# Patient Record
Sex: FEMALE | Race: BLACK OR AFRICAN AMERICAN | NOT HISPANIC OR LATINO | Employment: UNEMPLOYED | URBAN - METROPOLITAN AREA
[De-identification: names, ages, dates, MRNs, and addresses within clinical notes are randomized per-mention and may not be internally consistent; named-entity substitution may affect disease eponyms.]

---

## 2023-03-22 LAB
ALANINE AMINOTRANSFERASE (SGPT) (U/L) IN SER/PLAS: 13 U/L (ref 7–45)
ALBUMIN (G/DL) IN SER/PLAS: 3.7 G/DL (ref 3.4–5)
ALKALINE PHOSPHATASE (U/L) IN SER/PLAS: 102 U/L (ref 33–110)
ANION GAP IN SER/PLAS: 14 MMOL/L (ref 10–20)
ASPARTATE AMINOTRANSFERASE (SGOT) (U/L) IN SER/PLAS: 17 U/L (ref 9–39)
BASOPHILS (10*3/UL) IN BLOOD BY AUTOMATED COUNT: 0.06 X10E9/L (ref 0–0.1)
BASOPHILS/100 LEUKOCYTES IN BLOOD BY AUTOMATED COUNT: 0.3 % (ref 0–2)
BILIRUBIN DIRECT (MG/DL) IN SER/PLAS: 0.1 MG/DL (ref 0–0.3)
BILIRUBIN TOTAL (MG/DL) IN SER/PLAS: 0.6 MG/DL (ref 0–1.2)
C REACTIVE PROTEIN (MG/L) IN SER/PLAS: 15.92 MG/DL
CALCIUM (MG/DL) IN SER/PLAS: 9.2 MG/DL (ref 8.6–10.6)
CARBON DIOXIDE, TOTAL (MMOL/L) IN SER/PLAS: 26 MMOL/L (ref 21–32)
CHLORIDE (MMOL/L) IN SER/PLAS: 99 MMOL/L (ref 98–107)
CREATININE (MG/DL) IN SER/PLAS: 0.78 MG/DL (ref 0.5–1.05)
EOSINOPHILS (10*3/UL) IN BLOOD BY AUTOMATED COUNT: 0.26 X10E9/L (ref 0–0.7)
EOSINOPHILS/100 LEUKOCYTES IN BLOOD BY AUTOMATED COUNT: 1.4 % (ref 0–6)
ERYTHROCYTE DISTRIBUTION WIDTH (RATIO) BY AUTOMATED COUNT: 14.8 % (ref 11.5–14.5)
ERYTHROCYTE MEAN CORPUSCULAR HEMOGLOBIN CONCENTRATION (G/DL) BY AUTOMATED: 31.1 G/DL (ref 32–36)
ERYTHROCYTE MEAN CORPUSCULAR VOLUME (FL) BY AUTOMATED COUNT: 82 FL (ref 80–100)
ERYTHROCYTES (10*6/UL) IN BLOOD BY AUTOMATED COUNT: 3.8 X10E12/L (ref 4–5.2)
GFR FEMALE: >90 ML/MIN/1.73M2
GLUCOSE (MG/DL) IN SER/PLAS: 76 MG/DL (ref 74–99)
HEMATOCRIT (%) IN BLOOD BY AUTOMATED COUNT: 31.2 % (ref 36–46)
HEMOGLOBIN (G/DL) IN BLOOD: 9.7 G/DL (ref 12–16)
IMMATURE GRANULOCYTES/100 LEUKOCYTES IN BLOOD BY AUTOMATED COUNT: 0.3 % (ref 0–0.9)
LEUKOCYTES (10*3/UL) IN BLOOD BY AUTOMATED COUNT: 18.2 X10E9/L (ref 4.4–11.3)
LYMPHOCYTES (10*3/UL) IN BLOOD BY AUTOMATED COUNT: 3.29 X10E9/L (ref 1.2–4.8)
LYMPHOCYTES/100 LEUKOCYTES IN BLOOD BY AUTOMATED COUNT: 18.1 % (ref 13–44)
MONOCYTES (10*3/UL) IN BLOOD BY AUTOMATED COUNT: 1.53 X10E9/L (ref 0.1–1)
MONOCYTES/100 LEUKOCYTES IN BLOOD BY AUTOMATED COUNT: 8.4 % (ref 2–10)
NEUTROPHILS (10*3/UL) IN BLOOD BY AUTOMATED COUNT: 12.97 X10E9/L (ref 1.2–7.7)
NEUTROPHILS/100 LEUKOCYTES IN BLOOD BY AUTOMATED COUNT: 71.5 % (ref 40–80)
NRBC (PER 100 WBCS) BY AUTOMATED COUNT: 0 /100 WBC (ref 0–0)
PLATELETS (10*3/UL) IN BLOOD AUTOMATED COUNT: 584 X10E9/L (ref 150–450)
POTASSIUM (MMOL/L) IN SER/PLAS: 4.6 MMOL/L (ref 3.5–5.3)
PROTEIN TOTAL: 7.1 G/DL (ref 6.4–8.2)
SODIUM (MMOL/L) IN SER/PLAS: 134 MMOL/L (ref 136–145)
UREA NITROGEN (MG/DL) IN SER/PLAS: 7 MG/DL (ref 6–23)

## 2023-03-31 LAB
ALANINE AMINOTRANSFERASE (SGPT) (U/L) IN SER/PLAS: 9 U/L (ref 7–45)
ALBUMIN (G/DL) IN SER/PLAS: 3.7 G/DL (ref 3.4–5)
ALKALINE PHOSPHATASE (U/L) IN SER/PLAS: 103 U/L (ref 33–110)
ANION GAP IN SER/PLAS: 13 MMOL/L (ref 10–20)
ASPARTATE AMINOTRANSFERASE (SGOT) (U/L) IN SER/PLAS: 12 U/L (ref 9–39)
BASOPHILS (10*3/UL) IN BLOOD BY AUTOMATED COUNT: 0.06 X10E9/L (ref 0–0.1)
BASOPHILS/100 LEUKOCYTES IN BLOOD BY AUTOMATED COUNT: 0.5 % (ref 0–2)
BILIRUBIN DIRECT (MG/DL) IN SER/PLAS: 0 MG/DL (ref 0–0.3)
BILIRUBIN TOTAL (MG/DL) IN SER/PLAS: 0.3 MG/DL (ref 0–1.2)
C REACTIVE PROTEIN (MG/L) IN SER/PLAS: 1.74 MG/DL
CALCIUM (MG/DL) IN SER/PLAS: 8.8 MG/DL (ref 8.6–10.3)
CARBON DIOXIDE, TOTAL (MMOL/L) IN SER/PLAS: 26 MMOL/L (ref 21–32)
CHLORIDE (MMOL/L) IN SER/PLAS: 99 MMOL/L (ref 98–107)
CREATININE (MG/DL) IN SER/PLAS: 0.66 MG/DL (ref 0.5–1.05)
EOSINOPHILS (10*3/UL) IN BLOOD BY AUTOMATED COUNT: 0.16 X10E9/L (ref 0–0.7)
EOSINOPHILS/100 LEUKOCYTES IN BLOOD BY AUTOMATED COUNT: 1.3 % (ref 0–6)
ERYTHROCYTE DISTRIBUTION WIDTH (RATIO) BY AUTOMATED COUNT: 14.3 % (ref 11.5–14.5)
ERYTHROCYTE MEAN CORPUSCULAR HEMOGLOBIN CONCENTRATION (G/DL) BY AUTOMATED: 31.5 G/DL (ref 32–36)
ERYTHROCYTE MEAN CORPUSCULAR VOLUME (FL) BY AUTOMATED COUNT: 81 FL (ref 80–100)
ERYTHROCYTES (10*6/UL) IN BLOOD BY AUTOMATED COUNT: 3.79 X10E12/L (ref 4–5.2)
GFR FEMALE: >90 ML/MIN/1.73M2
GLUCOSE (MG/DL) IN SER/PLAS: 85 MG/DL (ref 74–99)
HEMATOCRIT (%) IN BLOOD BY AUTOMATED COUNT: 30.8 % (ref 36–46)
HEMOGLOBIN (G/DL) IN BLOOD: 9.7 G/DL (ref 12–16)
IMMATURE GRANULOCYTES/100 LEUKOCYTES IN BLOOD BY AUTOMATED COUNT: 0.4 % (ref 0–0.9)
LEUKOCYTES (10*3/UL) IN BLOOD BY AUTOMATED COUNT: 11.9 X10E9/L (ref 4.4–11.3)
LYMPHOCYTES (10*3/UL) IN BLOOD BY AUTOMATED COUNT: 3.94 X10E9/L (ref 1.2–4.8)
LYMPHOCYTES/100 LEUKOCYTES IN BLOOD BY AUTOMATED COUNT: 33 % (ref 13–44)
MONOCYTES (10*3/UL) IN BLOOD BY AUTOMATED COUNT: 0.72 X10E9/L (ref 0.1–1)
MONOCYTES/100 LEUKOCYTES IN BLOOD BY AUTOMATED COUNT: 6 % (ref 2–10)
NEUTROPHILS (10*3/UL) IN BLOOD BY AUTOMATED COUNT: 7 X10E9/L (ref 1.2–7.7)
NEUTROPHILS/100 LEUKOCYTES IN BLOOD BY AUTOMATED COUNT: 58.8 % (ref 40–80)
PLATELETS (10*3/UL) IN BLOOD AUTOMATED COUNT: 660 X10E9/L (ref 150–450)
POTASSIUM (MMOL/L) IN SER/PLAS: 4.2 MMOL/L (ref 3.5–5.3)
PROTEIN TOTAL: 7.3 G/DL (ref 6.4–8.2)
SODIUM (MMOL/L) IN SER/PLAS: 134 MMOL/L (ref 136–145)
UREA NITROGEN (MG/DL) IN SER/PLAS: 6 MG/DL (ref 6–23)

## 2023-08-02 LAB
ALANINE AMINOTRANSFERASE (SGPT) (U/L) IN SER/PLAS: 8 U/L (ref 7–45)
ALBUMIN (G/DL) IN SER/PLAS: 3.6 G/DL (ref 3.4–5)
ALKALINE PHOSPHATASE (U/L) IN SER/PLAS: 94 U/L (ref 33–110)
ANION GAP IN SER/PLAS: 12 MMOL/L (ref 10–20)
ASPARTATE AMINOTRANSFERASE (SGOT) (U/L) IN SER/PLAS: 13 U/L (ref 9–39)
BASOPHILS (10*3/UL) IN BLOOD BY AUTOMATED COUNT: 0.05 X10E9/L (ref 0–0.1)
BASOPHILS/100 LEUKOCYTES IN BLOOD BY AUTOMATED COUNT: 0.5 % (ref 0–2)
BILIRUBIN TOTAL (MG/DL) IN SER/PLAS: 0.3 MG/DL (ref 0–1.2)
C REACTIVE PROTEIN (MG/L) IN SER/PLAS: 0.76 MG/DL
CALCIUM (MG/DL) IN SER/PLAS: 8.3 MG/DL (ref 8.6–10.3)
CARBON DIOXIDE, TOTAL (MMOL/L) IN SER/PLAS: 25 MMOL/L (ref 21–32)
CHLORIDE (MMOL/L) IN SER/PLAS: 104 MMOL/L (ref 98–107)
CREATININE (MG/DL) IN SER/PLAS: 0.66 MG/DL (ref 0.5–1.05)
EOSINOPHILS (10*3/UL) IN BLOOD BY AUTOMATED COUNT: 0.2 X10E9/L (ref 0–0.7)
EOSINOPHILS/100 LEUKOCYTES IN BLOOD BY AUTOMATED COUNT: 1.8 % (ref 0–6)
ERYTHROCYTE DISTRIBUTION WIDTH (RATIO) BY AUTOMATED COUNT: 16.1 % (ref 11.5–14.5)
ERYTHROCYTE MEAN CORPUSCULAR HEMOGLOBIN CONCENTRATION (G/DL) BY AUTOMATED: 32.2 G/DL (ref 32–36)
ERYTHROCYTE MEAN CORPUSCULAR VOLUME (FL) BY AUTOMATED COUNT: 82 FL (ref 80–100)
ERYTHROCYTES (10*6/UL) IN BLOOD BY AUTOMATED COUNT: 3.73 X10E12/L (ref 4–5.2)
GFR FEMALE: >90 ML/MIN/1.73M2
GLUCOSE (MG/DL) IN SER/PLAS: 77 MG/DL (ref 74–99)
HEMATOCRIT (%) IN BLOOD BY AUTOMATED COUNT: 30.7 % (ref 36–46)
HEMOGLOBIN (G/DL) IN BLOOD: 9.9 G/DL (ref 12–16)
IMMATURE GRANULOCYTES/100 LEUKOCYTES IN BLOOD BY AUTOMATED COUNT: 0.3 % (ref 0–0.9)
LEUKOCYTES (10*3/UL) IN BLOOD BY AUTOMATED COUNT: 11.1 X10E9/L (ref 4.4–11.3)
LYMPHOCYTES (10*3/UL) IN BLOOD BY AUTOMATED COUNT: 2.98 X10E9/L (ref 1.2–4.8)
LYMPHOCYTES/100 LEUKOCYTES IN BLOOD BY AUTOMATED COUNT: 26.8 % (ref 13–44)
MONOCYTES (10*3/UL) IN BLOOD BY AUTOMATED COUNT: 0.86 X10E9/L (ref 0.1–1)
MONOCYTES/100 LEUKOCYTES IN BLOOD BY AUTOMATED COUNT: 7.7 % (ref 2–10)
NEUTROPHILS (10*3/UL) IN BLOOD BY AUTOMATED COUNT: 6.98 X10E9/L (ref 1.2–7.7)
NEUTROPHILS/100 LEUKOCYTES IN BLOOD BY AUTOMATED COUNT: 62.9 % (ref 40–80)
PLATELETS (10*3/UL) IN BLOOD AUTOMATED COUNT: 591 X10E9/L (ref 150–450)
POTASSIUM (MMOL/L) IN SER/PLAS: 3.9 MMOL/L (ref 3.5–5.3)
PROTEIN TOTAL: 6.6 G/DL (ref 6.4–8.2)
SEDIMENTATION RATE, ERYTHROCYTE: 28 MM/H (ref 0–20)
SODIUM (MMOL/L) IN SER/PLAS: 137 MMOL/L (ref 136–145)
UREA NITROGEN (MG/DL) IN SER/PLAS: 5 MG/DL (ref 6–23)

## 2023-08-14 LAB
ALANINE AMINOTRANSFERASE (SGPT) (U/L) IN SER/PLAS: 11 U/L (ref 7–45)
ALBUMIN (G/DL) IN SER/PLAS: 4.2 G/DL (ref 3.4–5)
ALKALINE PHOSPHATASE (U/L) IN SER/PLAS: 129 U/L (ref 33–110)
ANION GAP IN SER/PLAS: 15 MMOL/L (ref 10–20)
ASPARTATE AMINOTRANSFERASE (SGOT) (U/L) IN SER/PLAS: 15 U/L (ref 9–39)
BASOPHILS (10*3/UL) IN BLOOD BY AUTOMATED COUNT: 0.03 X10E9/L (ref 0–0.1)
BASOPHILS/100 LEUKOCYTES IN BLOOD BY AUTOMATED COUNT: 0.3 % (ref 0–2)
BILIRUBIN TOTAL (MG/DL) IN SER/PLAS: 0.4 MG/DL (ref 0–1.2)
C REACTIVE PROTEIN (MG/L) IN SER/PLAS: 0.36 MG/DL
CALCIUM (MG/DL) IN SER/PLAS: 9.5 MG/DL (ref 8.6–10.6)
CARBON DIOXIDE, TOTAL (MMOL/L) IN SER/PLAS: 24 MMOL/L (ref 21–32)
CHLORIDE (MMOL/L) IN SER/PLAS: 101 MMOL/L (ref 98–107)
CREATININE (MG/DL) IN SER/PLAS: 0.81 MG/DL (ref 0.5–1.05)
EOSINOPHILS (10*3/UL) IN BLOOD BY AUTOMATED COUNT: 0.16 X10E9/L (ref 0–0.7)
EOSINOPHILS/100 LEUKOCYTES IN BLOOD BY AUTOMATED COUNT: 1.7 % (ref 0–6)
ERYTHROCYTE DISTRIBUTION WIDTH (RATIO) BY AUTOMATED COUNT: 15.6 % (ref 11.5–14.5)
ERYTHROCYTE MEAN CORPUSCULAR HEMOGLOBIN CONCENTRATION (G/DL) BY AUTOMATED: 31.2 G/DL (ref 32–36)
ERYTHROCYTE MEAN CORPUSCULAR VOLUME (FL) BY AUTOMATED COUNT: 84 FL (ref 80–100)
ERYTHROCYTES (10*6/UL) IN BLOOD BY AUTOMATED COUNT: 4.04 X10E12/L (ref 4–5.2)
GFR FEMALE: >90 ML/MIN/1.73M2
GLUCOSE (MG/DL) IN SER/PLAS: 85 MG/DL (ref 74–99)
HEMATOCRIT (%) IN BLOOD BY AUTOMATED COUNT: 34 % (ref 36–46)
HEMOGLOBIN (G/DL) IN BLOOD: 10.6 G/DL (ref 12–16)
IMMATURE GRANULOCYTES/100 LEUKOCYTES IN BLOOD BY AUTOMATED COUNT: 0.2 % (ref 0–0.9)
LEUKOCYTES (10*3/UL) IN BLOOD BY AUTOMATED COUNT: 9.2 X10E9/L (ref 4.4–11.3)
LYMPHOCYTES (10*3/UL) IN BLOOD BY AUTOMATED COUNT: 3.46 X10E9/L (ref 1.2–4.8)
LYMPHOCYTES/100 LEUKOCYTES IN BLOOD BY AUTOMATED COUNT: 37.6 % (ref 13–44)
MONOCYTES (10*3/UL) IN BLOOD BY AUTOMATED COUNT: 0.64 X10E9/L (ref 0.1–1)
MONOCYTES/100 LEUKOCYTES IN BLOOD BY AUTOMATED COUNT: 7 % (ref 2–10)
NEUTROPHILS (10*3/UL) IN BLOOD BY AUTOMATED COUNT: 4.89 X10E9/L (ref 1.2–7.7)
NEUTROPHILS/100 LEUKOCYTES IN BLOOD BY AUTOMATED COUNT: 53.2 % (ref 40–80)
NRBC (PER 100 WBCS) BY AUTOMATED COUNT: 0 /100 WBC (ref 0–0)
PLATELETS (10*3/UL) IN BLOOD AUTOMATED COUNT: 550 X10E9/L (ref 150–450)
POTASSIUM (MMOL/L) IN SER/PLAS: 4.9 MMOL/L (ref 3.5–5.3)
PROTEIN TOTAL: 7.9 G/DL (ref 6.4–8.2)
SODIUM (MMOL/L) IN SER/PLAS: 135 MMOL/L (ref 136–145)
UREA NITROGEN (MG/DL) IN SER/PLAS: 4 MG/DL (ref 6–23)

## 2023-08-28 LAB
ALANINE AMINOTRANSFERASE (SGPT) (U/L) IN SER/PLAS: 15 U/L (ref 7–45)
ALBUMIN (G/DL) IN SER/PLAS: 3.9 G/DL (ref 3.4–5)
ALKALINE PHOSPHATASE (U/L) IN SER/PLAS: 115 U/L (ref 33–110)
ANION GAP IN SER/PLAS: 11 MMOL/L (ref 10–20)
ASPARTATE AMINOTRANSFERASE (SGOT) (U/L) IN SER/PLAS: 17 U/L (ref 9–39)
BASOPHILS (10*3/UL) IN BLOOD BY AUTOMATED COUNT: 0.04 X10E9/L (ref 0–0.1)
BASOPHILS/100 LEUKOCYTES IN BLOOD BY AUTOMATED COUNT: 0.5 % (ref 0–2)
BILIRUBIN TOTAL (MG/DL) IN SER/PLAS: 0.2 MG/DL (ref 0–1.2)
C REACTIVE PROTEIN (MG/L) IN SER/PLAS: 0.29 MG/DL
CALCIUM (MG/DL) IN SER/PLAS: 9.1 MG/DL (ref 8.6–10.6)
CARBON DIOXIDE, TOTAL (MMOL/L) IN SER/PLAS: 24 MMOL/L (ref 21–32)
CHLORIDE (MMOL/L) IN SER/PLAS: 104 MMOL/L (ref 98–107)
CREATININE (MG/DL) IN SER/PLAS: 0.82 MG/DL (ref 0.5–1.05)
EOSINOPHILS (10*3/UL) IN BLOOD BY AUTOMATED COUNT: 0.16 X10E9/L (ref 0–0.7)
EOSINOPHILS/100 LEUKOCYTES IN BLOOD BY AUTOMATED COUNT: 2 % (ref 0–6)
ERYTHROCYTE DISTRIBUTION WIDTH (RATIO) BY AUTOMATED COUNT: 15.6 % (ref 11.5–14.5)
ERYTHROCYTE MEAN CORPUSCULAR HEMOGLOBIN CONCENTRATION (G/DL) BY AUTOMATED: 31.9 G/DL (ref 32–36)
ERYTHROCYTE MEAN CORPUSCULAR VOLUME (FL) BY AUTOMATED COUNT: 84 FL (ref 80–100)
ERYTHROCYTES (10*6/UL) IN BLOOD BY AUTOMATED COUNT: 3.92 X10E12/L (ref 4–5.2)
GFR FEMALE: >90 ML/MIN/1.73M2
GLUCOSE (MG/DL) IN SER/PLAS: 73 MG/DL (ref 74–99)
HEMATOCRIT (%) IN BLOOD BY AUTOMATED COUNT: 32.9 % (ref 36–46)
HEMOGLOBIN (G/DL) IN BLOOD: 10.5 G/DL (ref 12–16)
IMMATURE GRANULOCYTES/100 LEUKOCYTES IN BLOOD BY AUTOMATED COUNT: 0.1 % (ref 0–0.9)
LEUKOCYTES (10*3/UL) IN BLOOD BY AUTOMATED COUNT: 7.9 X10E9/L (ref 4.4–11.3)
LYMPHOCYTES (10*3/UL) IN BLOOD BY AUTOMATED COUNT: 3.53 X10E9/L (ref 1.2–4.8)
LYMPHOCYTES/100 LEUKOCYTES IN BLOOD BY AUTOMATED COUNT: 44.7 % (ref 13–44)
MONOCYTES (10*3/UL) IN BLOOD BY AUTOMATED COUNT: 0.51 X10E9/L (ref 0.1–1)
MONOCYTES/100 LEUKOCYTES IN BLOOD BY AUTOMATED COUNT: 6.5 % (ref 2–10)
NEUTROPHILS (10*3/UL) IN BLOOD BY AUTOMATED COUNT: 3.64 X10E9/L (ref 1.2–7.7)
NEUTROPHILS/100 LEUKOCYTES IN BLOOD BY AUTOMATED COUNT: 46.2 % (ref 40–80)
NRBC (PER 100 WBCS) BY AUTOMATED COUNT: 0 /100 WBC (ref 0–0)
PLATELETS (10*3/UL) IN BLOOD AUTOMATED COUNT: 530 X10E9/L (ref 150–450)
POTASSIUM (MMOL/L) IN SER/PLAS: 4.5 MMOL/L (ref 3.5–5.3)
PROTEIN TOTAL: 7 G/DL (ref 6.4–8.2)
SEDIMENTATION RATE, ERYTHROCYTE: 31 MM/H (ref 0–20)
SODIUM (MMOL/L) IN SER/PLAS: 134 MMOL/L (ref 136–145)
UREA NITROGEN (MG/DL) IN SER/PLAS: 4 MG/DL (ref 6–23)

## 2023-09-16 LAB
ALANINE AMINOTRANSFERASE (SGPT) (U/L) IN SER/PLAS: 9 U/L (ref 7–45)
ALBUMIN (G/DL) IN SER/PLAS: 4.1 G/DL (ref 3.4–5)
ALKALINE PHOSPHATASE (U/L) IN SER/PLAS: 103 U/L (ref 33–110)
ANION GAP IN SER/PLAS: 11 MMOL/L (ref 10–20)
ASPARTATE AMINOTRANSFERASE (SGOT) (U/L) IN SER/PLAS: 15 U/L (ref 9–39)
BASOPHILS (10*3/UL) IN BLOOD BY AUTOMATED COUNT: 0.04 X10E9/L (ref 0–0.1)
BASOPHILS/100 LEUKOCYTES IN BLOOD BY AUTOMATED COUNT: 0.3 % (ref 0–2)
BILIRUBIN TOTAL (MG/DL) IN SER/PLAS: 0.2 MG/DL (ref 0–1.2)
C REACTIVE PROTEIN (MG/L) IN SER/PLAS: 0.32 MG/DL
CALCIUM (MG/DL) IN SER/PLAS: 9.2 MG/DL (ref 8.6–10.6)
CARBON DIOXIDE, TOTAL (MMOL/L) IN SER/PLAS: 25 MMOL/L (ref 21–32)
CHLORIDE (MMOL/L) IN SER/PLAS: 102 MMOL/L (ref 98–107)
CREATININE (MG/DL) IN SER/PLAS: 0.91 MG/DL (ref 0.5–1.05)
EOSINOPHILS (10*3/UL) IN BLOOD BY AUTOMATED COUNT: 0.13 X10E9/L (ref 0–0.7)
EOSINOPHILS/100 LEUKOCYTES IN BLOOD BY AUTOMATED COUNT: 1.1 % (ref 0–6)
ERYTHROCYTE DISTRIBUTION WIDTH (RATIO) BY AUTOMATED COUNT: 15.6 % (ref 11.5–14.5)
ERYTHROCYTE MEAN CORPUSCULAR HEMOGLOBIN CONCENTRATION (G/DL) BY AUTOMATED: 31.5 G/DL (ref 32–36)
ERYTHROCYTE MEAN CORPUSCULAR VOLUME (FL) BY AUTOMATED COUNT: 83 FL (ref 80–100)
ERYTHROCYTES (10*6/UL) IN BLOOD BY AUTOMATED COUNT: 3.94 X10E12/L (ref 4–5.2)
GFR FEMALE: 82 ML/MIN/1.73M2
GLUCOSE (MG/DL) IN SER/PLAS: 84 MG/DL (ref 74–99)
HEMATOCRIT (%) IN BLOOD BY AUTOMATED COUNT: 32.7 % (ref 36–46)
HEMOGLOBIN (G/DL) IN BLOOD: 10.3 G/DL (ref 12–16)
IMMATURE GRANULOCYTES/100 LEUKOCYTES IN BLOOD BY AUTOMATED COUNT: 0.7 % (ref 0–0.9)
LEUKOCYTES (10*3/UL) IN BLOOD BY AUTOMATED COUNT: 11.9 X10E9/L (ref 4.4–11.3)
LYMPHOCYTES (10*3/UL) IN BLOOD BY AUTOMATED COUNT: 5.07 X10E9/L (ref 1.2–4.8)
LYMPHOCYTES/100 LEUKOCYTES IN BLOOD BY AUTOMATED COUNT: 42.7 % (ref 13–44)
MONOCYTES (10*3/UL) IN BLOOD BY AUTOMATED COUNT: 0.8 X10E9/L (ref 0.1–1)
MONOCYTES/100 LEUKOCYTES IN BLOOD BY AUTOMATED COUNT: 6.7 % (ref 2–10)
NEUTROPHILS (10*3/UL) IN BLOOD BY AUTOMATED COUNT: 5.76 X10E9/L (ref 1.2–7.7)
NEUTROPHILS/100 LEUKOCYTES IN BLOOD BY AUTOMATED COUNT: 48.5 % (ref 40–80)
NRBC (PER 100 WBCS) BY AUTOMATED COUNT: 0 /100 WBC (ref 0–0)
PLATELETS (10*3/UL) IN BLOOD AUTOMATED COUNT: 471 X10E9/L (ref 150–450)
POTASSIUM (MMOL/L) IN SER/PLAS: 4.3 MMOL/L (ref 3.5–5.3)
PROTEIN TOTAL: 7.4 G/DL (ref 6.4–8.2)
SEDIMENTATION RATE, ERYTHROCYTE: 17 MM/H (ref 0–20)
SODIUM (MMOL/L) IN SER/PLAS: 134 MMOL/L (ref 136–145)
UREA NITROGEN (MG/DL) IN SER/PLAS: 7 MG/DL (ref 6–23)

## 2023-09-27 LAB
ALANINE AMINOTRANSFERASE (SGPT) (U/L) IN SER/PLAS: 7 U/L (ref 7–45)
ALBUMIN (G/DL) IN SER/PLAS: 4.1 G/DL (ref 3.4–5)
ALKALINE PHOSPHATASE (U/L) IN SER/PLAS: 109 U/L (ref 33–110)
ANION GAP IN SER/PLAS: 10 MMOL/L (ref 10–20)
ASPARTATE AMINOTRANSFERASE (SGOT) (U/L) IN SER/PLAS: 13 U/L (ref 9–39)
BASOPHILS (10*3/UL) IN BLOOD BY AUTOMATED COUNT: 0.03 X10E9/L (ref 0–0.1)
BASOPHILS/100 LEUKOCYTES IN BLOOD BY AUTOMATED COUNT: 0.4 % (ref 0–2)
BILIRUBIN TOTAL (MG/DL) IN SER/PLAS: 0.4 MG/DL (ref 0–1.2)
C REACTIVE PROTEIN (MG/L) IN SER/PLAS: 1.21 MG/DL
CALCIUM (MG/DL) IN SER/PLAS: 8.7 MG/DL (ref 8.6–10.3)
CARBON DIOXIDE, TOTAL (MMOL/L) IN SER/PLAS: 27 MMOL/L (ref 21–32)
CHLORIDE (MMOL/L) IN SER/PLAS: 101 MMOL/L (ref 98–107)
CREATININE (MG/DL) IN SER/PLAS: 0.79 MG/DL (ref 0.5–1.05)
EOSINOPHILS (10*3/UL) IN BLOOD BY AUTOMATED COUNT: 0.25 X10E9/L (ref 0–0.7)
EOSINOPHILS/100 LEUKOCYTES IN BLOOD BY AUTOMATED COUNT: 2.9 % (ref 0–6)
ERYTHROCYTE DISTRIBUTION WIDTH (RATIO) BY AUTOMATED COUNT: 15.6 % (ref 11.5–14.5)
ERYTHROCYTE MEAN CORPUSCULAR HEMOGLOBIN CONCENTRATION (G/DL) BY AUTOMATED: 32.6 G/DL (ref 32–36)
ERYTHROCYTE MEAN CORPUSCULAR VOLUME (FL) BY AUTOMATED COUNT: 83 FL (ref 80–100)
ERYTHROCYTES (10*6/UL) IN BLOOD BY AUTOMATED COUNT: 3.83 X10E12/L (ref 4–5.2)
GFR FEMALE: >90 ML/MIN/1.73M2
GLUCOSE (MG/DL) IN SER/PLAS: 93 MG/DL (ref 74–99)
HEMATOCRIT (%) IN BLOOD BY AUTOMATED COUNT: 31.6 % (ref 36–46)
HEMOGLOBIN (G/DL) IN BLOOD: 10.3 G/DL (ref 12–16)
IMMATURE GRANULOCYTES/100 LEUKOCYTES IN BLOOD BY AUTOMATED COUNT: 0.4 % (ref 0–0.9)
LEUKOCYTES (10*3/UL) IN BLOOD BY AUTOMATED COUNT: 8.5 X10E9/L (ref 4.4–11.3)
LYMPHOCYTES (10*3/UL) IN BLOOD BY AUTOMATED COUNT: 3.44 X10E9/L (ref 1.2–4.8)
LYMPHOCYTES/100 LEUKOCYTES IN BLOOD BY AUTOMATED COUNT: 40.3 % (ref 13–44)
MONOCYTES (10*3/UL) IN BLOOD BY AUTOMATED COUNT: 0.69 X10E9/L (ref 0.1–1)
MONOCYTES/100 LEUKOCYTES IN BLOOD BY AUTOMATED COUNT: 8.1 % (ref 2–10)
NEUTROPHILS (10*3/UL) IN BLOOD BY AUTOMATED COUNT: 4.09 X10E9/L (ref 1.2–7.7)
NEUTROPHILS/100 LEUKOCYTES IN BLOOD BY AUTOMATED COUNT: 47.9 % (ref 40–80)
PLATELETS (10*3/UL) IN BLOOD AUTOMATED COUNT: 507 X10E9/L (ref 150–450)
POTASSIUM (MMOL/L) IN SER/PLAS: 4 MMOL/L (ref 3.5–5.3)
PROTEIN TOTAL: 7.2 G/DL (ref 6.4–8.2)
SODIUM (MMOL/L) IN SER/PLAS: 134 MMOL/L (ref 136–145)
UREA NITROGEN (MG/DL) IN SER/PLAS: 5 MG/DL (ref 6–23)

## 2023-10-01 PROBLEM — M86.271 SUBACUTE OSTEOMYELITIS OF RIGHT FOOT (MULTI): Status: ACTIVE | Noted: 2023-10-01

## 2023-10-01 PROBLEM — J45.909 ASTHMATIC BRONCHITIS (HHS-HCC): Status: ACTIVE | Noted: 2023-10-01

## 2023-10-01 PROBLEM — C34.90 MALIGNANT NEOPLASM OF UNSPECIFIED PART OF UNSPECIFIED BRONCHUS OR LUNG (MULTI): Status: ACTIVE | Noted: 2023-10-01

## 2023-10-01 PROBLEM — D49.89: Status: ACTIVE | Noted: 2023-10-01

## 2023-10-01 PROBLEM — M76.61 ACHILLES TENDINITIS OF RIGHT LOWER EXTREMITY: Status: ACTIVE | Noted: 2023-10-01

## 2023-10-01 PROBLEM — J31.0 RHINITIS: Status: ACTIVE | Noted: 2023-10-01

## 2023-10-01 PROBLEM — D72.829 LEUKOCYTOSIS: Status: ACTIVE | Noted: 2023-10-01

## 2023-10-01 PROBLEM — M79.89 LEG SWELLING: Status: ACTIVE | Noted: 2023-10-01

## 2023-10-01 PROBLEM — R22.2 MASS OF LEFT CHEST WALL: Status: ACTIVE | Noted: 2023-10-01

## 2023-10-01 PROBLEM — A49.9 POLYMICROBIAL BACTERIAL INFECTION: Status: ACTIVE | Noted: 2023-10-01

## 2023-10-01 PROBLEM — R91.8 LUNG MASS: Status: ACTIVE | Noted: 2023-10-01

## 2023-10-01 PROBLEM — C34.90 LUNG CANCER (MULTI): Status: ACTIVE | Noted: 2023-10-01

## 2023-10-01 PROBLEM — C79.51 METASTATIC CANCER TO BONE (MULTI): Status: ACTIVE | Noted: 2023-10-01

## 2023-10-01 PROBLEM — S86.011A ACHILLES RUPTURE, RIGHT: Status: ACTIVE | Noted: 2023-10-01

## 2023-10-01 PROBLEM — T84.7XXA HARDWARE COMPLICATING WOUND INFECTION (CMS-HCC): Status: ACTIVE | Noted: 2023-10-01

## 2023-10-01 PROBLEM — J06.9 VIRAL UPPER RESPIRATORY INFECTION: Status: ACTIVE | Noted: 2023-10-01

## 2023-10-01 PROBLEM — S92.009B: Status: ACTIVE | Noted: 2023-10-01

## 2023-10-01 PROBLEM — Z04.9 CONDITION NOT FOUND: Status: ACTIVE | Noted: 2023-10-01

## 2023-10-01 PROBLEM — U07.1 2019 NOVEL CORONAVIRUS DETECTED: Status: ACTIVE | Noted: 2023-10-01

## 2023-10-01 PROBLEM — M89.9 BONE LESION: Status: ACTIVE | Noted: 2023-10-01

## 2023-10-01 PROBLEM — E27.8 ADRENAL MASS (MULTI): Status: ACTIVE | Noted: 2023-10-01

## 2023-10-01 PROBLEM — C80.1: Status: ACTIVE | Noted: 2023-10-01

## 2023-10-01 PROBLEM — R91.1 LUNG NODULE: Status: ACTIVE | Noted: 2023-10-01

## 2023-10-01 PROBLEM — M86.171: Status: ACTIVE | Noted: 2023-10-01

## 2023-10-01 PROBLEM — G89.3 NEOPLASM RELATED PAIN: Status: ACTIVE | Noted: 2023-10-01

## 2023-10-01 PROBLEM — M79.606 LOWER EXTREMITY PAIN: Status: ACTIVE | Noted: 2023-10-01

## 2023-10-01 PROBLEM — S86.019A ACHILLES TENDON AVULSION: Status: ACTIVE | Noted: 2023-10-01

## 2023-10-01 PROBLEM — T81.31XA DEHISCENCE OF INCISION: Status: ACTIVE | Noted: 2023-10-01

## 2023-10-01 PROBLEM — F17.210 CIGARETTE NICOTINE DEPENDENCE WITHOUT COMPLICATION: Status: ACTIVE | Noted: 2023-10-01

## 2023-10-01 PROBLEM — L02.91 ABSCESS: Status: ACTIVE | Noted: 2023-10-01

## 2023-10-01 PROBLEM — C79.89: Status: ACTIVE | Noted: 2023-10-01

## 2023-10-01 PROBLEM — C76.8: Status: ACTIVE | Noted: 2023-10-01

## 2023-10-01 PROBLEM — D64.9 ANEMIA: Status: ACTIVE | Noted: 2023-10-01

## 2023-10-01 PROBLEM — M86.10 OSTEOMYELITIS, ACUTE (MULTI): Status: ACTIVE | Noted: 2023-10-01

## 2023-10-01 PROBLEM — Z87.828 HISTORY OF GUNSHOT WOUND: Status: ACTIVE | Noted: 2023-10-01

## 2023-10-01 PROBLEM — K59.03 DRUG-INDUCED CONSTIPATION: Status: ACTIVE | Noted: 2023-10-01

## 2023-10-01 PROBLEM — F11.90 OPIOID USE: Status: ACTIVE | Noted: 2023-10-01

## 2023-10-01 PROBLEM — Z85.118 HISTORY OF LUNG CANCER: Status: ACTIVE | Noted: 2023-10-01

## 2023-10-01 RX ORDER — IBUPROFEN 800 MG/1
TABLET ORAL
COMMUNITY
End: 2024-03-11 | Stop reason: WASHOUT

## 2023-10-01 RX ORDER — CEFEPIME HYDROCHLORIDE 2 G/1
INJECTION, POWDER, FOR SOLUTION INTRAVENOUS
COMMUNITY
Start: 2022-12-21 | End: 2024-01-11 | Stop reason: ALTCHOICE

## 2023-10-01 RX ORDER — OXYCODONE AND ACETAMINOPHEN 5; 325 MG/1; MG/1
1 TABLET ORAL EVERY 6 HOURS PRN
COMMUNITY
Start: 2023-04-04 | End: 2023-10-26 | Stop reason: ALTCHOICE

## 2023-10-01 RX ORDER — LEVOFLOXACIN 750 MG/1
1 TABLET ORAL DAILY
COMMUNITY
End: 2023-10-26 | Stop reason: ALTCHOICE

## 2023-10-01 RX ORDER — OXYCODONE HYDROCHLORIDE 10 MG/1
10 TABLET ORAL
COMMUNITY
End: 2023-10-31 | Stop reason: DRUGHIGH

## 2023-10-01 RX ORDER — ASPIRIN 81 MG/1
81 TABLET ORAL 2 TIMES DAILY
COMMUNITY
Start: 2023-03-21 | End: 2023-10-26 | Stop reason: ALTCHOICE

## 2023-10-01 RX ORDER — ACETAMINOPHEN 325 MG/1
650 TABLET ORAL EVERY 6 HOURS PRN
COMMUNITY
Start: 2022-12-19 | End: 2024-01-11 | Stop reason: SDUPTHER

## 2023-10-01 RX ORDER — ALBUTEROL SULFATE 90 UG/1
2 AEROSOL, METERED RESPIRATORY (INHALATION)
COMMUNITY
Start: 2015-09-04 | End: 2024-01-11 | Stop reason: SDUPTHER

## 2023-10-01 RX ORDER — OMEPRAZOLE 10 MG/1
10 CAPSULE, DELAYED RELEASE ORAL DAILY
COMMUNITY
Start: 2023-07-24 | End: 2024-03-11 | Stop reason: WASHOUT

## 2023-10-01 RX ORDER — FOLIC ACID 20 MG
CAPSULE ORAL
COMMUNITY
End: 2024-03-11 | Stop reason: WASHOUT

## 2023-10-01 RX ORDER — AMOXICILLIN 250 MG
1 CAPSULE ORAL NIGHTLY PRN
COMMUNITY
Start: 2020-09-08 | End: 2024-03-11 | Stop reason: WASHOUT

## 2023-10-01 RX ORDER — ACETAMINOPHEN 500 MG/1
1-2 TABLET, FILM COATED ORAL EVERY 8 HOURS PRN
COMMUNITY
Start: 2022-11-22

## 2023-10-01 RX ORDER — ERTAPENEM 1 G/1
INJECTION, POWDER, LYOPHILIZED, FOR SOLUTION INTRAMUSCULAR; INTRAVENOUS
COMMUNITY
Start: 2023-07-22 | End: 2024-03-11 | Stop reason: WASHOUT

## 2023-10-01 RX ORDER — MONTELUKAST SODIUM 10 MG/1
10 TABLET ORAL DAILY
COMMUNITY

## 2023-10-01 RX ORDER — PREGABALIN 150 MG/1
CAPSULE ORAL
COMMUNITY
Start: 2020-10-20 | End: 2023-10-26 | Stop reason: SINTOL

## 2023-10-01 RX ORDER — BUDESONIDE AND FORMOTEROL FUMARATE DIHYDRATE 80; 4.5 UG/1; UG/1
AEROSOL RESPIRATORY (INHALATION)
COMMUNITY
Start: 2022-12-28

## 2023-10-01 RX ORDER — IPRATROPIUM BROMIDE 21 UG/1
2 SPRAY, METERED NASAL 3 TIMES DAILY PRN
COMMUNITY
Start: 2021-01-26 | End: 2024-03-11 | Stop reason: WASHOUT

## 2023-10-01 RX ORDER — PHENYLEPHRINE HCL 10 MG/1
1 TABLET, FILM COATED ORAL EVERY 4 HOURS PRN
COMMUNITY
Start: 2021-01-28

## 2023-10-01 RX ORDER — SPIRONOLACTONE 25 MG
TABLET ORAL
COMMUNITY
Start: 2023-07-24

## 2023-10-01 RX ORDER — ONDANSETRON 4 MG/1
8 TABLET, FILM COATED ORAL EVERY 6 HOURS PRN
COMMUNITY
Start: 2023-07-24 | End: 2024-03-11 | Stop reason: WASHOUT

## 2023-10-01 RX ORDER — GABAPENTIN 300 MG/1
300 CAPSULE ORAL 3 TIMES DAILY PRN
COMMUNITY
Start: 2023-04-04 | End: 2023-12-08 | Stop reason: SDUPTHER

## 2023-10-01 RX ORDER — CEFUROXIME AXETIL 500 MG/1
TABLET ORAL
COMMUNITY
Start: 2022-05-27 | End: 2023-10-26 | Stop reason: ALTCHOICE

## 2023-10-01 RX ORDER — CYCLOBENZAPRINE HCL 5 MG
1 TABLET ORAL 3 TIMES DAILY PRN
COMMUNITY
Start: 2022-09-15 | End: 2024-03-11 | Stop reason: WASHOUT

## 2023-10-01 RX ORDER — DOCUSATE SODIUM 100 MG/1
100 CAPSULE, LIQUID FILLED ORAL 2 TIMES DAILY
COMMUNITY
Start: 2023-03-21 | End: 2023-10-26 | Stop reason: ALTCHOICE

## 2023-10-01 RX ORDER — OXYCODONE HYDROCHLORIDE 5 MG/1
5 TABLET ORAL EVERY 6 HOURS PRN
COMMUNITY
Start: 2023-05-02 | End: 2023-10-31 | Stop reason: DRUGHIGH

## 2023-10-01 RX ORDER — ALBUTEROL SULFATE 90 UG/1
1 AEROSOL, METERED RESPIRATORY (INHALATION) AS NEEDED
COMMUNITY

## 2023-10-03 ENCOUNTER — APPOINTMENT (OUTPATIENT)
Dept: ORTHOPEDIC SURGERY | Facility: HOSPITAL | Age: 38
End: 2023-10-03
Payer: COMMERCIAL

## 2023-10-05 ENCOUNTER — LAB REQUISITION (OUTPATIENT)
Dept: LAB | Facility: HOSPITAL | Age: 38
End: 2023-10-05
Payer: COMMERCIAL

## 2023-10-05 DIAGNOSIS — M86.9 OSTEOMYELITIS, UNSPECIFIED (MULTI): ICD-10-CM

## 2023-10-05 LAB
BASOPHILS # BLD AUTO: 0.05 X10*3/UL (ref 0–0.1)
BASOPHILS NFR BLD AUTO: 0.6 %
EOSINOPHIL # BLD AUTO: 0.18 X10*3/UL (ref 0–0.7)
EOSINOPHIL NFR BLD AUTO: 2 %
ERYTHROCYTE [DISTWIDTH] IN BLOOD BY AUTOMATED COUNT: 15.5 % (ref 11.5–14.5)
ERYTHROCYTE [SEDIMENTATION RATE] IN BLOOD BY WESTERGREN METHOD: 16 MM/H (ref 0–20)
HCT VFR BLD AUTO: 32.2 % (ref 36–46)
HGB BLD-MCNC: 10.3 G/DL (ref 12–16)
IMM GRANULOCYTES # BLD AUTO: 0.03 X10*3/UL (ref 0–0.7)
IMM GRANULOCYTES NFR BLD AUTO: 0.3 % (ref 0–0.9)
LYMPHOCYTES # BLD AUTO: 4.48 X10*3/UL (ref 1.2–4.8)
LYMPHOCYTES NFR BLD AUTO: 50.8 %
MCH RBC QN AUTO: 26.3 PG (ref 26–34)
MCHC RBC AUTO-ENTMCNC: 32 G/DL (ref 32–36)
MCV RBC AUTO: 82 FL (ref 80–100)
MONOCYTES # BLD AUTO: 0.53 X10*3/UL (ref 0.1–1)
MONOCYTES NFR BLD AUTO: 6 %
NEUTROPHILS # BLD AUTO: 3.55 X10*3/UL (ref 1.2–7.7)
NEUTROPHILS NFR BLD AUTO: 40.3 %
NRBC BLD-RTO: 0 /100 WBCS (ref 0–0)
PLATELET # BLD AUTO: 509 X10*3/UL (ref 150–450)
PMV BLD AUTO: 10.1 FL (ref 7.5–11.5)
RBC # BLD AUTO: 3.92 X10*6/UL (ref 4–5.2)
WBC # BLD AUTO: 8.8 X10*3/UL (ref 4.4–11.3)

## 2023-10-05 PROCEDURE — 85652 RBC SED RATE AUTOMATED: CPT

## 2023-10-05 PROCEDURE — 85025 COMPLETE CBC W/AUTO DIFF WBC: CPT

## 2023-10-05 PROCEDURE — 80053 COMPREHEN METABOLIC PANEL: CPT

## 2023-10-05 PROCEDURE — 86140 C-REACTIVE PROTEIN: CPT

## 2023-10-06 LAB
ALBUMIN SERPL BCP-MCNC: 3.8 G/DL (ref 3.4–5)
ALP SERPL-CCNC: 92 U/L (ref 33–110)
ALT SERPL W P-5'-P-CCNC: 5 U/L (ref 7–45)
ANION GAP SERPL CALC-SCNC: 11 MMOL/L (ref 10–20)
AST SERPL W P-5'-P-CCNC: 12 U/L (ref 9–39)
BILIRUB SERPL-MCNC: 0.2 MG/DL (ref 0–1.2)
BUN SERPL-MCNC: 6 MG/DL (ref 6–23)
CALCIUM SERPL-MCNC: 8.4 MG/DL (ref 8.6–10.3)
CHLORIDE SERPL-SCNC: 104 MMOL/L (ref 98–107)
CO2 SERPL-SCNC: 27 MMOL/L (ref 21–32)
CREAT SERPL-MCNC: 0.92 MG/DL (ref 0.5–1.05)
CRP SERPL-MCNC: 0.3 MG/DL
GFR SERPL CREATININE-BSD FRML MDRD: 81 ML/MIN/1.73M*2
GLUCOSE SERPL-MCNC: 94 MG/DL (ref 74–99)
POTASSIUM SERPL-SCNC: 3.9 MMOL/L (ref 3.5–5.3)
PROT SERPL-MCNC: 6.4 G/DL (ref 6.4–8.2)
SODIUM SERPL-SCNC: 138 MMOL/L (ref 136–145)

## 2023-10-16 ENCOUNTER — ANCILLARY PROCEDURE (OUTPATIENT)
Dept: RADIOLOGY | Facility: CLINIC | Age: 38
End: 2023-10-16
Payer: COMMERCIAL

## 2023-10-16 ENCOUNTER — OFFICE VISIT (OUTPATIENT)
Dept: ORTHOPEDIC SURGERY | Facility: CLINIC | Age: 38
End: 2023-10-16
Payer: COMMERCIAL

## 2023-10-16 DIAGNOSIS — M86.171 ACUTE OSTEOMYELITIS OF RIGHT CALCANEUS (MULTI): Primary | ICD-10-CM

## 2023-10-16 DIAGNOSIS — S92.031P: ICD-10-CM

## 2023-10-16 PROCEDURE — 99024 POSTOP FOLLOW-UP VISIT: CPT | Performed by: ORTHOPAEDIC SURGERY

## 2023-10-16 PROCEDURE — 73650 X-RAY EXAM OF HEEL: CPT | Mod: RIGHT SIDE | Performed by: RADIOLOGY

## 2023-10-16 PROCEDURE — 73650 X-RAY EXAM OF HEEL: CPT | Mod: RT

## 2023-10-16 NOTE — PROGRESS NOTES
Subjective    Patient ID: Janette Panda is a 38 y.o. female.    Chief Complaint: Right calcaneus osteomyelitis       HPI    Patient presents to office s/p repeat right calcaneus I&D performed on 7/19/2023. In February 2021 she sustained an avulsion calcaneus fracture, she was seen in clinic by Dr. Lee on 8/30/2022 when he recommended operative management due to malunion. Patient underwent right calcaneal osteotomy with Achilles tendon repair on 9/8/2022. She then developed osteomyelitis and underwent I&D of the right calcaneus on 12/15/2022, repeat I&D on 3/17/2023.     Patient is not ambulating using crutches.  She is currently getting ertapenem 1 g IV daily and plan to discontinue antibiotics according to infectious disease doctor in a week.  Patient report that the wound is dry.  She does daily dressing changes with dry dressing however she only has a spot all 2 on the dressings.  SDenies any fevers, chills, sweats, chest pain, shortness of breath. Denies any calf pain or swelling.  Objective   Ortho Exam  Gen: The patient is alert and oriented ×3, is in no acute distress, and appear their stated age and weight.  Well approximated surgical incision without any drainage, erythema, or sign of infection. There is a scab over the heel wound.  There is a very small scab area about the heel without drainage or erythema.  No tenderness to palpation.  Sensation to light touch is intact distally. Distal pulses are palpable. Motor intact including GS/TA/EHL. Able to perform SLR/knee ROM. Calf supple and non-TTP.     Image Results:    I personally reviewed right calcaneus radiographs today and they show stable appearance.  There appears to be interval calcification within defect in the calcaneus.  No lucencies.    Assessment/Plan   Encounter Diagnoses:  Closed displaced avulsion fracture of tuberosity of right calcaneus with malunion, subsequent encounter  Patient presents to office s/p repeat right calcaneus I&D  performed on 7/19/2023 with Dr. Lee. In February 2021 she sustained an avulsion calcaneus fracture, she was seen in clinic by Dr. Lee on 8/30/2022 when he recommended operative management due to malunion. Patient underwent right calcaneal osteotomy with Achilles tendon repair on 9/8/2022. She then developed osteomyelitis and underwent I&D of the right calcaneus on 12/15/2022, repeat I&D on 3/17/2023.     Her wound is well-healed without expressible discharge or active drainage. Patient will continue local dressing to there is no drainage on the dressing.  I will recommend continue antibiotics until the wound is completely healed with normal skin.  She will continue weightbearing as tolerated.  She will continue to follow-up with infectious disease doctor.  Plan to have the patient follow-up in 8weeks for wound check, will obtain radiographs of the right calcaneus at this visit.     The patient was agreeable with the above plan and was appreciative of the care provided today.             Orders Placed This Encounter    XR calcaneus right 2 views     No follow-ups on file.

## 2023-10-17 ENCOUNTER — LAB REQUISITION (OUTPATIENT)
Dept: LAB | Facility: HOSPITAL | Age: 38
End: 2023-10-17
Payer: COMMERCIAL

## 2023-10-17 DIAGNOSIS — M86.9 OSTEOMYELITIS, UNSPECIFIED (MULTI): ICD-10-CM

## 2023-10-17 LAB
ALBUMIN SERPL BCP-MCNC: 4 G/DL (ref 3.4–5)
ALP SERPL-CCNC: 100 U/L (ref 33–110)
ALT SERPL W P-5'-P-CCNC: 6 U/L (ref 7–45)
ANION GAP SERPL CALC-SCNC: 10 MMOL/L (ref 10–20)
AST SERPL W P-5'-P-CCNC: 13 U/L (ref 9–39)
BASOPHILS # BLD MANUAL: 0 X10*3/UL (ref 0–0.1)
BASOPHILS NFR BLD MANUAL: 0 %
BILIRUB SERPL-MCNC: 0.4 MG/DL (ref 0–1.2)
BITE CELLS BLD QL SMEAR: PRESENT
BUN SERPL-MCNC: 7 MG/DL (ref 6–23)
BURR CELLS BLD QL SMEAR: ABNORMAL
CALCIUM SERPL-MCNC: 8.7 MG/DL (ref 8.6–10.3)
CHLORIDE SERPL-SCNC: 103 MMOL/L (ref 98–107)
CO2 SERPL-SCNC: 25 MMOL/L (ref 21–32)
CREAT SERPL-MCNC: 0.75 MG/DL (ref 0.5–1.05)
CRP SERPL-MCNC: 0.55 MG/DL
EOSINOPHIL # BLD MANUAL: 0.23 X10*3/UL (ref 0–0.7)
EOSINOPHIL NFR BLD MANUAL: 2 %
ERYTHROCYTE [DISTWIDTH] IN BLOOD BY AUTOMATED COUNT: 15.9 % (ref 11.5–14.5)
ERYTHROCYTE [SEDIMENTATION RATE] IN BLOOD BY WESTERGREN METHOD: 35 MM/H (ref 0–20)
GFR SERPL CREATININE-BSD FRML MDRD: >90 ML/MIN/1.73M*2
GLUCOSE SERPL-MCNC: 85 MG/DL (ref 74–99)
HCT VFR BLD AUTO: 32.8 % (ref 36–46)
HGB BLD-MCNC: 10.9 G/DL (ref 12–16)
HOLD SPECIMEN: NORMAL
IMM GRANULOCYTES # BLD AUTO: 0.05 X10*3/UL (ref 0–0.7)
IMM GRANULOCYTES NFR BLD AUTO: 0.4 % (ref 0–0.9)
LYMPHOCYTES # BLD MANUAL: 4.33 X10*3/UL (ref 1.2–4.8)
LYMPHOCYTES NFR BLD MANUAL: 37 %
MCH RBC QN AUTO: 26.9 PG (ref 26–34)
MCHC RBC AUTO-ENTMCNC: 33.2 G/DL (ref 32–36)
MCV RBC AUTO: 81 FL (ref 80–100)
MONOCYTES # BLD MANUAL: 0.12 X10*3/UL (ref 0.1–1)
MONOCYTES NFR BLD MANUAL: 1 %
NEUTS SEG # BLD MANUAL: 6.08 X10*3/UL (ref 1.2–7)
NEUTS SEG NFR BLD MANUAL: 52 %
NRBC BLD-RTO: 0 /100 WBCS (ref 0–0)
PLATELET # BLD AUTO: 463 X10*3/UL (ref 150–450)
PMV BLD AUTO: 10.1 FL (ref 7.5–11.5)
POTASSIUM SERPL-SCNC: 4.4 MMOL/L (ref 3.5–5.3)
PROT SERPL-MCNC: 7.2 G/DL (ref 6.4–8.2)
RBC # BLD AUTO: 4.05 X10*6/UL (ref 4–5.2)
RBC MORPH BLD: ABNORMAL
SCHISTOCYTES BLD QL SMEAR: ABNORMAL
SODIUM SERPL-SCNC: 134 MMOL/L (ref 136–145)
TARGETS BLD QL SMEAR: ABNORMAL
TOTAL CELLS COUNTED BLD: 100
VARIANT LYMPHS # BLD MANUAL: 0.94 X10*3/UL (ref 0–0.5)
VARIANT LYMPHS NFR BLD: 8 %
WBC # BLD AUTO: 11.7 X10*3/UL (ref 4.4–11.3)

## 2023-10-17 PROCEDURE — 80053 COMPREHEN METABOLIC PANEL: CPT

## 2023-10-17 PROCEDURE — 85007 BL SMEAR W/DIFF WBC COUNT: CPT

## 2023-10-17 PROCEDURE — 85652 RBC SED RATE AUTOMATED: CPT

## 2023-10-17 PROCEDURE — 85027 COMPLETE CBC AUTOMATED: CPT

## 2023-10-17 PROCEDURE — 86140 C-REACTIVE PROTEIN: CPT

## 2023-10-26 ENCOUNTER — OFFICE VISIT (OUTPATIENT)
Dept: INFECTIOUS DISEASES | Facility: CLINIC | Age: 38
End: 2023-10-26
Payer: COMMERCIAL

## 2023-10-26 VITALS
BODY MASS INDEX: 25.71 KG/M2 | SYSTOLIC BLOOD PRESSURE: 107 MMHG | DIASTOLIC BLOOD PRESSURE: 66 MMHG | WEIGHT: 160 LBS | HEART RATE: 67 BPM | HEIGHT: 66 IN

## 2023-10-26 DIAGNOSIS — M86.10: Primary | ICD-10-CM

## 2023-10-26 PROCEDURE — 99214 OFFICE O/P EST MOD 30 MIN: CPT | Performed by: INTERNAL MEDICINE

## 2023-10-26 PROCEDURE — 3008F BODY MASS INDEX DOCD: CPT | Performed by: INTERNAL MEDICINE

## 2023-10-30 ENCOUNTER — TELEPHONE (OUTPATIENT)
Dept: ADMISSION | Facility: HOSPITAL | Age: 38
End: 2023-10-30
Payer: COMMERCIAL

## 2023-10-30 NOTE — TELEPHONE ENCOUNTER
Janette Panda called the refill line for Oxycodone 10mg tabs. Requesting refills be sent to [facility] pharmacy; message sent to team to submit.

## 2023-10-30 NOTE — TELEPHONE ENCOUNTER
Patient calls back again stating that the pharmacy still does not have a script for 10mg Oxycodone.  Preferred pharmacy is [facility] on [address].  Message sent to team.

## 2023-10-31 ENCOUNTER — DOCUMENTATION (OUTPATIENT)
Dept: INFECTIOUS DISEASES | Facility: HOSPITAL | Age: 38
End: 2023-10-31
Payer: COMMERCIAL

## 2023-10-31 ENCOUNTER — TELEPHONE (OUTPATIENT)
Dept: ADMISSION | Facility: HOSPITAL | Age: 38
End: 2023-10-31
Payer: COMMERCIAL

## 2023-10-31 DIAGNOSIS — C34.90 ADENOCARCINOMA, LUNG, UNSPECIFIED LATERALITY (MULTI): Primary | ICD-10-CM

## 2023-10-31 RX ORDER — OXYCODONE HYDROCHLORIDE 10 MG/1
10 TABLET ORAL EVERY 6 HOURS PRN
Qty: 10 TABLET | Refills: 0 | Status: SHIPPED | OUTPATIENT
Start: 2023-10-31 | End: 2023-12-08 | Stop reason: SDUPTHER

## 2023-10-31 ASSESSMENT — ENCOUNTER SYMPTOMS
ALLERGIC/IMMUNOLOGIC NEGATIVE: 1
CARDIOVASCULAR NEGATIVE: 1
MUSCULOSKELETAL NEGATIVE: 1
NEUROLOGICAL NEGATIVE: 1
HEMATOLOGIC/LYMPHATIC NEGATIVE: 1
ENDOCRINE NEGATIVE: 1
EYES NEGATIVE: 1
PSYCHIATRIC NEGATIVE: 1
GASTROINTESTINAL NEGATIVE: 1
RESPIRATORY NEGATIVE: 1
CONSTITUTIONAL NEGATIVE: 1

## 2023-11-01 ENCOUNTER — TELEPHONE (OUTPATIENT)
Dept: ADMISSION | Facility: HOSPITAL | Age: 38
End: 2023-11-01
Payer: COMMERCIAL

## 2023-11-01 NOTE — TELEPHONE ENCOUNTER
Patient called the office she would like to know why she went to a five day supply on her oxycodone she states she has an appt on 11/15. Please let me know and I will make her aware.

## 2023-11-01 NOTE — TELEPHONE ENCOUNTER
Patient calling to inquire on updated RX. Notified RX hasn't been updated yet. Message sent to provider.

## 2023-11-01 NOTE — PROGRESS NOTES
Infectious Diseases Clinic Follow-up:    Reason for Visit: Routine follow up with mother    History of Current Issue  Janette Panda is a 38 y.o. 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 Doing well on IV ABXS for many months now     PAST MEDICAL HISTORY:  Past Medical History:   Diagnosis Date    Personal history of other diseases of the respiratory system 01/19/2021    History of sinus problem       PAST SURGICAL HISTORY:  Past Surgical History:   Procedure Laterality Date    OTHER SURGICAL HISTORY  09/15/2020    Appendectomy laparoscopic       ALLERGIES:    Allergies   Allergen Reactions    Amitriptyline Itching and Other     nausea    Tetracyclines Nausea/vomiting     Vomiting     Adhesive Tape-Silicones Itching    Latex Unknown    Other Itching and Other     Tegaderm Absorbent Dressing -itching  [id number] Standard - blisters and rash    Tramadol Other     N/V       MEDICATIONS:      Current Outpatient Medications:     acetaminophen (Tylenol) 325 mg tablet, Take 2 tablets (650 mg) by mouth every 6 hours if needed for moderate pain (4 - 6)., Disp: , Rfl:     albuterol (ProAir HFA) 90 mcg/actuation inhaler, Inhale 2 puffs. EVERY 4-6 HOURS AS NEEDED., Disp: , Rfl:     albuterol 90 mcg/actuation inhaler, Inhale 1 puff if needed., Disp: , Rfl:     calcium carb,lactate/vit D3 (CALCET PETITES ORAL), Take 1 tablet by mouth twice a day. Calcet 500 mg-400 intl units (10 mcg) oral tablet, chewable, Disp: , Rfl:     calcium carbonate-vitamin D3 (Calcium 600 with Vitamin D3) 600 mg-10 mcg (400 unit) chewable tablet, Calcium 600/Vitamin D 600-10 MG-MCG Oral Tablet Chewable Quantity: 60  Refills: 0  Start: 24-Jul-2023, Disp: , Rfl:     cefepime (Maxipime) 2 gram injection, Cefepime HCl 2 GM SOLR Quantity: 21  Refills: 0  Start: 21-Dec-2022, Disp: , Rfl:     cyclobenzaprine (Flexeril) 5 mg tablet, Take 1 tablet (5 mg) by mouth 3 times a day as needed., Disp: , Rfl:      ELDERBERRY FRUIT ORAL, Take 1 capsule by mouth once daily., Disp: , Rfl:     ertapenem (INVanz) 1 gram injection, Ertapenem Sodium 1 GM Injection Solution Reconstituted Quantity: 6  Refills: 0  Start: 22-Jul-2023, Disp: , Rfl:     folic acid 20 mg capsule, Folic Acid CAPS  Refills: 0     Active, Disp: , Rfl:     gabapentin (Neurontin) 300 mg capsule, Take 1 capsule (300 mg) by mouth 3 times a day as needed (nerve pain)., Disp: , Rfl:     ibuprofen 800 mg tablet, Ibuprofen 800 MG Oral Tablet Refills: 0, Disp: , Rfl:     ipratropium (Atrovent) 21 mcg (0.03 %) nasal spray, Administer 2 sprays into each nostril 3 times a day as needed., Disp: , Rfl:     montelukast (Singulair) 10 mg tablet, Take 1 tablet (10 mg) by mouth once daily., Disp: , Rfl:     NON FORMULARY, Moringa supplement 2 cap(s) orally once a day, Disp: , Rfl:     NON FORMULARY, soursop 1500 mg capsule 2 cap(s) orally once a day, Disp: , Rfl:     NON FORMULARY, mullein capsules 2 cap(s) orally once a day, Disp: , Rfl:     NON FORMULARY, sea singleton capsules 2 cap(s) orally once a day, Disp: , Rfl:     omeprazole (PriLOSEC) 10 mg DR capsule, Take 1 capsule (10 mg) by mouth once daily. while taking aspirin, Disp: , Rfl:     ondansetron (Zofran) 4 mg tablet, Take 2 tablets (8 mg) by mouth every 6 hours if needed for nausea., Disp: , Rfl:     oxyCODONE (Roxicodone) 10 mg immediate release tablet, Take 1 tablet (10 mg) by mouth every 6 hours if needed for severe pain (7 - 10). 1 TO 2 TIMES A DAY, AS NEEDED FOR SEVERE BREAKTHROUGH PAIN, Disp: 10 tablet, Rfl: 0    phenylephrine (Sudafed PE) 10 mg tablet, Take 1 tablet (10 mg) by mouth every 4 hours if needed., Disp: , Rfl:     sennosides-docusate sodium (Zohreh-Colace) 8.6-50 mg tablet, Take 1 tablet by mouth as needed at bedtime for constipation., Disp: , Rfl:     Symbicort 80-4.5 mcg/actuation inhaler, Symbicort 80-4.5 MCG/ACT Inhalation Aerosol Quantity: 10  Refills: 0  Start: 28-Dec-2022, Disp: , Rfl:      Tylenol Extra Strength 500 mg tablet, Take 1-2 tablets (500-1,000 mg) by mouth every 8 hours if needed for mild pain (1 - 3) or moderate pain (4 - 6)., Disp: , Rfl:     REVIEW OF SYSTEMS:  Review of Systems   Constitutional: Negative.    HENT: Negative.     Eyes: Negative.    Respiratory: Negative.     Cardiovascular: Negative.    Gastrointestinal: Negative.    Endocrine: Negative.    Genitourinary: Negative.    Musculoskeletal: Negative.    Skin: Negative.    Allergic/Immunologic: Negative.    Neurological: Negative.    Hematological: Negative.    Psychiatric/Behavioral: Negative.         PHYSICAL EXAMINATION:     There were no vitals taken for this visit.     Physical Exam  Vitals reviewed.   Constitutional:       Appearance: Normal appearance.   HENT:      Head: Normocephalic and atraumatic.      Mouth/Throat:      Mouth: Mucous membranes are moist.      Pharynx: Oropharynx is clear.   Cardiovascular:      Rate and Rhythm: Normal rate and regular rhythm.   Pulmonary:      Effort: Pulmonary effort is normal.   Abdominal:      General: Abdomen is flat. Bowel sounds are normal.      Palpations: Abdomen is soft.   Musculoskeletal:         General: Deformity present. Normal range of motion.      Cervical back: Normal range of motion.      Comments: LLE heel ulcer almost healed, with small non-draining tract   Skin:     General: Skin is warm.   Neurological:      General: No focal deficit present.      Mental Status: She is alert and oriented to person, place, and time.   Psychiatric:         Mood and Affect: Mood normal.         Behavior: Behavior normal.         Thought Content: Thought content normal.         Judgment: Judgment normal.            ASSESSMENT / RECOMMENDATIONS:  Patient is a 37-year-old female with PMHx lung malignancy w/mets to R ankle c/b recurrent osteomyelitis s/p right Achilles tendon repair and multiple I&D/saucerizations, now s/p R calc I&D and saucerization on 7/19. ID is consulted for long  term antibiotic recommendations.     Microbiology  -12/15/22: BCX with CoNS in 1/4 vials, determined to be likely contaminant  -12/15/22: calcaneal wound cx grew Serratia, Pseudomonas (both S=cipro, cefepime)  -3/16/23: MRSA nares negative  -3/16/23: calcaneal wound cx (3/3 sets) grew Serratia (S=Cipro, cefepime, levo; R=cefazolin)  -7/16/23: blood cx x2 sets â€“ NGTD  -7/17/23: blood cx x2 sets â€“ NGTD  -7/19/23: calcaneal wound (1/3 sets) gram stain showing gram positive cocci, pending cx     Antibiotics  Cefepime (8 week course, completed 2/23/23)  Cefepime (3/19/23 â€“ 4/6/23)  Levofloxacin (4/6/23 - completed 4/27/23)  Ceftriaxone (7/16)  Vancomycin (7/16 â€“ 7/17)  Zosyn (7/17)  Cefazolin (7/19 - )     Assessment  #Acute on Chronic Osteomyelitis s/p multiple surgical interventions and abx courses  #S/p R calcaneal I&D and saucerization 7/19  :: Followed outpatient by Dr. Omer  :: XR ankle foot (7/17) s/f extensive osteolysis which appears slightly progressed relative to prior calcaneal radiography and markedly progressed relative to priorfoot radiography, compatible  with worsening acute on chronic osteomyelitis.  Pt initially had R foot pain from metastasis s/p palliative RT and right calcaneal surgery for malunion in 9/2022. This was complicated by serratia and pseudomonas osteomyelitis in 12/2022, which was treated with I&D, saucerization, and 2 months of cefepime. 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 Now currently presenting with acute on chronic osteomyelitis, drainage from wound, s/p OR on 7/19 for I&D and R calcaneal saucerization.     Gram stain of wound (7/19) positive for gram positive cocci, pending culture results. Unclear what organisms may be causing patient's osteomyelitis/STI. Possible that infection is  polymicrobial. Also cannot r/o persistent infection by Serratia and Pseudomonas given previous positive bone cultures and hx of persistent serratia in calcaneal culture after 8 weeks of IV antibiotics. For now, pending culture results to determine optimal abx regimen and continuing broad spectrum abx coverage. Given patient's refusal of PICC placement and possibility of leaving AMA, would start bactrim OP.        7/21/23  1) Continue vanc/zosyn pending cultures  2) Advise that patient stays until cultures have resulted  3) If there continues to be difficulty establishing access, patient leaves AMA, or pt discharged early, start bactrim DS BID for 4 weeks  4) Follow-up with Dr. Omer (pt's ID provider) in 4 weeks to determine whether abx should be continued. We will schedule appointment.     Recommendations  Continue ertapenem 1 g IV every 24 hours for an additional 2-4 weeks, will determine EOT based on progress. Tolu eventually need Bactrim for chronic suppression.  Weekly labs including CBC with diff, BMP and CRP and fax labs to my office (fax : [phone]).  RTC in 3-4 mo        Pt is in agreement with plan    I spent 30 minutes in the professional and overall care of this patient.      José Miguel Omer MD MPH

## 2023-11-02 ENCOUNTER — TELEPHONE (OUTPATIENT)
Dept: ADMISSION | Facility: HOSPITAL | Age: 38
End: 2023-11-02
Payer: COMMERCIAL

## 2023-11-02 NOTE — TELEPHONE ENCOUNTER
Patient states she called yesterday to request Rx remainder of Rx for Oxycodone 10 mg PO tabs be sent to [facility], RX not yet received by [facility], last sent on 10/31/23 for Oxycodone 10mg tabs - Quantity #10    Secure Chat message sent to Lilliam Flannery      Per Lilliam Flannery CNP will send the remainder of the oxycodone RX 10mg PO tabs tomorrow- November 3, 2023, patient notified.

## 2023-11-15 ENCOUNTER — APPOINTMENT (OUTPATIENT)
Dept: HEMATOLOGY/ONCOLOGY | Facility: HOSPITAL | Age: 38
End: 2023-11-15
Payer: COMMERCIAL

## 2023-11-27 ENCOUNTER — APPOINTMENT (OUTPATIENT)
Dept: HEMATOLOGY/ONCOLOGY | Facility: HOSPITAL | Age: 38
End: 2023-11-27
Payer: COMMERCIAL

## 2023-12-06 ENCOUNTER — TELEPHONE (OUTPATIENT)
Dept: HEMATOLOGY/ONCOLOGY | Facility: HOSPITAL | Age: 38
End: 2023-12-06
Payer: COMMERCIAL

## 2023-12-06 NOTE — TELEPHONE ENCOUNTER
Refill request received for Oxycodone 10mg.  Preferred pharmacy is [facility] at [address] [facility] [facility].  Message sent to team.

## 2023-12-07 NOTE — TELEPHONE ENCOUNTER
I called patient to let her know about the refill when she sees Lilliam tomorrow. She said she had tested positive for covid over two weeks ago and was put on an antibiotic and Paxlovid. She said two days ago she still shows up positive. She wants to know if it is still ok to come in to appointment tomorrow with a mask. Messaged team.

## 2023-12-07 NOTE — TELEPHONE ENCOUNTER
I called patient as instructed by Lilliam Flannery to change her in person visit to a virtual due to still testing positive for covid. Pt is aware this is now a virtual appointment. Pt agreed with plan.    No

## 2023-12-07 NOTE — TELEPHONE ENCOUNTER
Patient called nurse triage line today and said [facility] on [address] still didn't receive her RX for Oxycodone 10mg. Can you please put in RX?

## 2023-12-08 ENCOUNTER — TELEMEDICINE (OUTPATIENT)
Dept: HEMATOLOGY/ONCOLOGY | Facility: HOSPITAL | Age: 38
End: 2023-12-08
Payer: COMMERCIAL

## 2023-12-08 DIAGNOSIS — C34.11 MALIGNANT NEOPLASM OF UPPER LOBE OF RIGHT LUNG (MULTI): Primary | ICD-10-CM

## 2023-12-08 DIAGNOSIS — C34.90 ADENOCARCINOMA, LUNG, UNSPECIFIED LATERALITY (MULTI): ICD-10-CM

## 2023-12-08 PROCEDURE — 99214 OFFICE O/P EST MOD 30 MIN: CPT | Performed by: CLINICAL NURSE SPECIALIST

## 2023-12-08 RX ORDER — OXYCODONE HYDROCHLORIDE 10 MG/1
10 TABLET ORAL EVERY 6 HOURS PRN
Qty: 60 TABLET | Refills: 0 | Status: SHIPPED | OUTPATIENT
Start: 2023-12-08 | End: 2024-01-12 | Stop reason: SDUPTHER

## 2023-12-08 RX ORDER — GABAPENTIN 300 MG/1
300 CAPSULE ORAL 3 TIMES DAILY PRN
Qty: 270 CAPSULE | Refills: 3 | Status: SHIPPED | OUTPATIENT
Start: 2023-12-08

## 2023-12-08 NOTE — PROGRESS NOTES
Patient ID: Janette Panda is a 38 y.o. female.         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 PROBABLE Non-small cell lung cancer (poorly differentiated squamous?) Date of diagnosis is August 13, 2020 from a core biopsy of the left posterolateral chest wall mass.  Pathology  reports extensively necrotic epithelioid proliferation in a fibrotic background.  Tumor cells were somewhat rhabdoid in appearance with hyperchromatic, eccentric, moderately to markedly atypical nuclei some with big nuclei and eosinophilic cytoplasm.   By immunohistochemistry tumors were positive for keratin AE1/AE3, CAM 5.2, CK7, calretinin and KATIE-3 and negative for CK20 WT 1, P 40, desmin, SOX-10, CD34, TTF-1, CDX 2, PAX 8, estrogen receptor.  I and I immunostain retained nuclear expression.  The  differential diagnosis was a malignant epithelioid neoplasm including poorly differentiated carcinoma and mesothelioma.        STAGING     Stage IV disease, T1b (1.7 cm nodule within the right upper lobe), NX, M1C         CURRENT SITES OF DISEASE     RUL, left chest wall pleural based, left pleura, right infraclavicular LN, bilateral adrenal glands, right foot metatarsal bone  MRI of brain negative for malignancy        MOLECULAR GENOMICS     Insufficient tissue fotr NGS     1- CancerType ID Molecular Cancer :     Tumor type indeterminate. Cannot be excluded Squamous Cell 35%, Pancreatobiliary 31%, Ovary <5%     Ruled out with 95% confidence: adrenal, brain, cervix and endometrial adenoca, GE adeno, GIST, Germcell     Salivary, colorectal, Kidney, Hepatocellular, lung adeno, lymphoma, melanoma, mesotheliona, neuroendocrin     (including MERKEL and small cell), sarcoma, thymus, thyroid, bladder     2- TEMPUS ctDNA: KRAS Q61H - 44.5% allelic frequency                             CDKN2A  H83Y Missense varialt - LOF - allelic frequency 22%                             TP53 - R175H missense variant LOF                             ARID1A LOF allelic frequency 24%                             MSI high not detected                             MEdian allelic frquency 23.4%     3- PD-L1 IHC TPS: 90%        SERUM TUMOR MARKER     Normal CEA and CA 19.9 in September 2020        PRIOR THERAPY     1- XRT to left chest wall.  2- Palliative XRT to right foot  3- Single agent Keytruda (refused chemotherapy) based on high PD-L1 expression starting Nov 2, 2020. Clinical improvement, radiographic stable disease/pseudoprogression initially; evidence of response on imaging of April 2021.  Completed 2 years of treatment  in October 2022           CURRENT THERAPY     1- Supportive Oncology for symptom management  2- Obsevration        CURRENT ONCOLOGICAL PROBLEMS     1-severe chest pain related to her left chest wall mass - resolved  2-severe constipation - improved  3-scan on 11/15/2021 showed ground glass opacities, thought by radiology to represent pneumonitis- patient is asymptomatic.  Will hold treatment 11/22/2021 and re-assess in 3 weeks  3-anorexia and losing weight - much improved  4-anxiety - improved  5- Right foot pain from metastasis - improved . Right calcaneal surgery for malunion on September 8, 2022 postoperative infection.  Antibiotic treatments in December 2022. She has required to further right calcaneus wound irrigation debridement, removal  of implants operation on December 15, 2022 as well as further surgery on March 17, 2023.  She is required IV antibiotics for osteomyelitis and followed by infectious diseases.  Bone cultures with multiple organisms.  Osteomyelitis  6-no birth control at present- will use protection if she has sex, aware she should avoid pregnancy- re-reviewed on 4/13/2022        HISTORY OF PRESENT ILLNESS     This is a very nice 37-year-old patient.  She went to the [facility] emergency room on August 12, 2020 with severe back pain and a large lump that was painful.  When asking her when this started she states approximately 1 to 2 weeks prior to  this although she had been losing weight for several months and was having some on and off pain there when questioning her more carefully.  She underwent a imaging studies with a CT scan of the chest abdomen and pelvis on August 13, 2020 which demonstrated  a 1.7 cm opacity within the right upper lobe felt to be most likely extrapulmonary arising from the pleura or chest wall.  The mass had a lobulated appearance with a small focus of macroscopic fat but no associated calcification.  Furthermore a 2.7 cm  rim-enhancing hypodense lesion with surrounding inflammatory changes was seen within the posterior and lateral aspect of the left 9th-10th rib.  Within the abdomen a 2.9 cm heterogeneous nodule was seen within the right adrenal gland.  She undergoes a  CT-guided biopsy and fine-needle aspiration on August 13, 2020 showing malignant cells along the posterolateral chest wall mass on the left side.  The pathology as described above.  She was seen initially by medical oncology on September 4, 2020 but referred  to my clinic for further evaluation on the day of this visit on September 16, 2020 given the unclear site of origin and pathology.        PAST MEDICAL HISTORY     1- Appendectomy  2- Kidney stones        SOCIAL HISTORY     Patient is 35 years old, she is single, she has no children, she lives with her mother and sister, she lives in [facility] [place].  She started at the age of 14 smoking and currently still smokes at the age of 35.  Smokes on average 2 packs a day for the  past 20 years.  She works in manufacturing        CURRENT MEDS     See med list        ALLERGIES     Tetracycline        FAMILY HISTORY     Mother had stage III ovarian cancer at the age of 51.  Grandmother on the father's side had 2 cancers one in the breast and one  in the lung.     Subjective    Today I am meeting with Janette by video call- she has missed the last 2 scheduled appointments with me due to car trouble, and she has just had covid and is testing positive still, so we agreed to meet by video.  She is still having some problems with nasal congestion, cough, and feeling run down.  She did have paxlovid and a course of oral antibiotics, and is still taking the Ertapenem for her osteomyelitis by PICC line.  She states that she still has drainage from the foot wound, and is following with orthopaedics on 12/19/23 and ID in January.  She says that she is having nerve pain and aching in her foot and heel, worse with walking.  She takes 1 or 2 tabs of oxycodone for severe pain once or twice a day.  She is out of gabapentin, but did feel that helped the nerve pain, so I will renew that as well.             Objective    BSA: There is no height or weight on file to calculate BSA.  There were no vitals taken for this visit.     Physical Exam  No physical exam was done due to the virtual nature of the exam.      Performance Status:  Symptomatic; fully ambulatory      Assessment/Plan     Janette is a 38 year old woman with a history of lung cancer with a calcaneous metastasis who had a complete response to immune therapy, and has been in observation since October, 2022.  Follow up this year has been complicated by her hospitalizations for foot surgery and subsequent episode of osteomyelitis requiring prolonged IV antibiotic therapy.      Today we discussed appropriate follow up.  October labs were reviewed and look good.  She has good appetite and energy, and no obvious clinical signs of her cancer progressing.  She has agreed to a repeat CT scan of the chest and blood work in early January and a return to the clinic to meet with Dr. Mayen.  These will be ordered today.  I also reviewed her OARRS report, and have agreed to renew her oxycodone and gabapentin for her foot pain  related to her cancer surgery.  Use appears to be appropriate.         Lilliam Flannery, APRN-CNS

## 2023-12-13 LAB — HOLD SPECIMEN: NORMAL

## 2023-12-19 ENCOUNTER — HOSPITAL ENCOUNTER (OUTPATIENT)
Dept: RADIOLOGY | Facility: HOSPITAL | Age: 38
Discharge: HOME | End: 2023-12-19
Payer: COMMERCIAL

## 2023-12-19 ENCOUNTER — OFFICE VISIT (OUTPATIENT)
Dept: ORTHOPEDIC SURGERY | Facility: HOSPITAL | Age: 38
End: 2023-12-19
Payer: COMMERCIAL

## 2023-12-19 VITALS — BODY MASS INDEX: 26.66 KG/M2 | HEIGHT: 65 IN | WEIGHT: 160 LBS

## 2023-12-19 DIAGNOSIS — S92.001P: ICD-10-CM

## 2023-12-19 PROCEDURE — 99213 OFFICE O/P EST LOW 20 MIN: CPT | Performed by: ORTHOPAEDIC SURGERY

## 2023-12-19 PROCEDURE — 73650 X-RAY EXAM OF HEEL: CPT | Mod: RT

## 2023-12-19 PROCEDURE — 1036F TOBACCO NON-USER: CPT | Performed by: ORTHOPAEDIC SURGERY

## 2023-12-19 ASSESSMENT — PAIN SCALES - GENERAL: PAINLEVEL_OUTOF10: 6

## 2023-12-19 ASSESSMENT — PAIN - FUNCTIONAL ASSESSMENT: PAIN_FUNCTIONAL_ASSESSMENT: 0-10

## 2023-12-19 ASSESSMENT — PAIN DESCRIPTION - DESCRIPTORS: DESCRIPTORS: ACHING

## 2023-12-19 NOTE — PROGRESS NOTES
Subjective    Patient ID: Janette Panda is a 38 y.o. female.    Chief Complaint: Follow-up of the Right Heel     Last Surgery: I&D of right calcaneus  Last Surgery Date: 7/19/2023    HPI  Patient is a 38 y.o. female who is s/p right calcaneus I&D on 7/19/2023. Patient is currently ambulating on the right foot. Patient states that she has had little drainage coming from the wound, but it is currently dry with scabs. Patient is continuing IV ertapenem for the osteomyelitis per infectious disease. States that she currently receives treatment at home. States that she believes that the IV antibiotics will be discontinued soon and she will be transitioned to oral antibiotics. Patient denies fever or chills, N/T, night sweats, chest pain, and shortness of breath. Denies calf pain and foot swelling.     ROS: All other systems have been reviewed and are negative except as previously noted in history of present illness.      IMP:  Problem List Items Addressed This Visit    None  Visit Diagnoses       Closed nondisplaced fracture of right calcaneus with malunion, unspecified portion of calcaneus, subsequent encounter        Relevant Orders    XR calcaneus right 2 views            Objective   General: Alert and oriented x 3, NAD, respirations easy and unlabored with no audible wheezes, skin warm and dry, speech and dress appropriate for noted age, affect euthymic.     Musculoskeletal: Right foot and ankle  incisions c/d/i  no swelling to ankle and foot  compartments soft  no calf tenderness  sensation intact to light touch  motor intact including TA/GS/EHL  palpable DP/PT pulses 2+     X-ray: Images of right calcaneus reviewed personally by me today and reveal stable appearance. There appears to be interval calcification within defect in the calcaneus. No lucencies.    Assessment/Plan   Encounter Diagnoses:  Closed nondisplaced fracture of right calcaneus with malunion, unspecified portion of calcaneus, subsequent  encounter    PLAN: Patient is s/p right calcaneus I&D on 7/19/2023. Patient overall is doing well at this time. Patient will continue to weight bear as tolerated. Imaging shows stable appearance of the calcaneus. Patient will continue her IV ertapenem or PO per infectious disease. Patient will follow up in 3 months for repeat xrays and to evaluate incision and patient condition. Patient is in agreement with this plan. Xrays of right calcaneus will be needed at next visit.   Orders Placed This Encounter    XR calcaneus right 2 views

## 2023-12-20 ENCOUNTER — TELEPHONE (OUTPATIENT)
Dept: INFECTIOUS DISEASES | Facility: HOSPITAL | Age: 38
End: 2023-12-20
Payer: COMMERCIAL

## 2023-12-20 NOTE — TELEPHONE ENCOUNTER
Roxanne nursing manager with [facility] calling to report patient is refusing to answer calls and text from them to have her Picc Line pulled. She has been discontinue for almost 2 weeks now. She last spoke or text with he patient on Friday 12/15/23.     She has and appointment with you on 1/11/24 @ 9 am.

## 2024-01-04 ENCOUNTER — APPOINTMENT (OUTPATIENT)
Dept: INFECTIOUS DISEASES | Facility: CLINIC | Age: 39
End: 2024-01-04
Payer: COMMERCIAL

## 2024-01-08 ENCOUNTER — HOSPITAL ENCOUNTER (OUTPATIENT)
Dept: RADIOLOGY | Facility: HOSPITAL | Age: 39
Discharge: HOME | End: 2024-01-08
Payer: COMMERCIAL

## 2024-01-08 DIAGNOSIS — C34.90 ADENOCARCINOMA, LUNG, UNSPECIFIED LATERALITY (MULTI): ICD-10-CM

## 2024-01-08 DIAGNOSIS — C34.11 MALIGNANT NEOPLASM OF UPPER LOBE OF RIGHT LUNG (MULTI): ICD-10-CM

## 2024-01-08 PROCEDURE — 71260 CT THORAX DX C+: CPT

## 2024-01-08 PROCEDURE — 2550000001 HC RX 255 CONTRASTS: Performed by: CLINICAL NURSE SPECIALIST

## 2024-01-08 RX ADMIN — IOHEXOL 75 ML: 350 INJECTION, SOLUTION INTRAVENOUS at 16:47

## 2024-01-10 ENCOUNTER — OFFICE VISIT (OUTPATIENT)
Dept: HEMATOLOGY/ONCOLOGY | Facility: HOSPITAL | Age: 39
End: 2024-01-10
Payer: COMMERCIAL

## 2024-01-10 ENCOUNTER — LAB (OUTPATIENT)
Dept: LAB | Facility: HOSPITAL | Age: 39
End: 2024-01-10
Payer: COMMERCIAL

## 2024-01-10 VITALS
BODY MASS INDEX: 26.16 KG/M2 | HEART RATE: 81 BPM | OXYGEN SATURATION: 100 % | DIASTOLIC BLOOD PRESSURE: 66 MMHG | WEIGHT: 157.19 LBS | SYSTOLIC BLOOD PRESSURE: 112 MMHG | TEMPERATURE: 97.2 F | RESPIRATION RATE: 18 BRPM

## 2024-01-10 DIAGNOSIS — C79.9 SECONDARY MALIGNANT NEOPLASM OF UNSPECIFIED SITE (CODE) (MULTI): ICD-10-CM

## 2024-01-10 DIAGNOSIS — C34.11 MALIGNANT NEOPLASM OF UPPER LOBE OF RIGHT LUNG (MULTI): ICD-10-CM

## 2024-01-10 DIAGNOSIS — C34.90 ADENOCARCINOMA, LUNG, UNSPECIFIED LATERALITY (MULTI): ICD-10-CM

## 2024-01-10 LAB
ALBUMIN SERPL BCP-MCNC: 4.1 G/DL (ref 3.4–5)
ALP SERPL-CCNC: 92 U/L (ref 33–110)
ALT SERPL W P-5'-P-CCNC: 5 U/L (ref 7–45)
ANION GAP SERPL CALC-SCNC: 13 MMOL/L (ref 10–20)
AST SERPL W P-5'-P-CCNC: 12 U/L (ref 9–39)
BASOPHILS # BLD AUTO: 0.03 X10*3/UL (ref 0–0.1)
BASOPHILS NFR BLD AUTO: 0.3 %
BILIRUB SERPL-MCNC: 0.4 MG/DL (ref 0–1.2)
BUN SERPL-MCNC: 3 MG/DL (ref 6–23)
CALCIUM SERPL-MCNC: 9.2 MG/DL (ref 8.6–10.3)
CHLORIDE SERPL-SCNC: 106 MMOL/L (ref 98–107)
CO2 SERPL-SCNC: 22 MMOL/L (ref 21–32)
CORTIS SERPL-MCNC: 16.7 UG/DL (ref 2.5–20)
CREAT SERPL-MCNC: 0.77 MG/DL (ref 0.5–1.05)
EGFRCR SERPLBLD CKD-EPI 2021: >90 ML/MIN/1.73M*2
EOSINOPHIL # BLD AUTO: 0.09 X10*3/UL (ref 0–0.7)
EOSINOPHIL NFR BLD AUTO: 1 %
ERYTHROCYTE [DISTWIDTH] IN BLOOD BY AUTOMATED COUNT: 14.1 % (ref 11.5–14.5)
GLUCOSE SERPL-MCNC: 92 MG/DL (ref 74–99)
HCT VFR BLD AUTO: 34.8 % (ref 36–46)
HGB BLD-MCNC: 11.5 G/DL (ref 12–16)
IMM GRANULOCYTES # BLD AUTO: 0.02 X10*3/UL (ref 0–0.7)
IMM GRANULOCYTES NFR BLD AUTO: 0.2 % (ref 0–0.9)
LYMPHOCYTES # BLD AUTO: 2.73 X10*3/UL (ref 1.2–4.8)
LYMPHOCYTES NFR BLD AUTO: 30.1 %
MCH RBC QN AUTO: 26.7 PG (ref 26–34)
MCHC RBC AUTO-ENTMCNC: 33 G/DL (ref 32–36)
MCV RBC AUTO: 81 FL (ref 80–100)
MONOCYTES # BLD AUTO: 0.77 X10*3/UL (ref 0.1–1)
MONOCYTES NFR BLD AUTO: 8.5 %
NEUTROPHILS # BLD AUTO: 5.42 X10*3/UL (ref 1.2–7.7)
NEUTROPHILS NFR BLD AUTO: 59.9 %
NRBC BLD-RTO: 0 /100 WBCS (ref 0–0)
PLATELET # BLD AUTO: 412 X10*3/UL (ref 150–450)
POTASSIUM SERPL-SCNC: 3.8 MMOL/L (ref 3.5–5.3)
PROT SERPL-MCNC: 7.4 G/DL (ref 6.4–8.2)
RBC # BLD AUTO: 4.3 X10*6/UL (ref 4–5.2)
SODIUM SERPL-SCNC: 137 MMOL/L (ref 136–145)
TSH SERPL-ACNC: 2.41 MIU/L (ref 0.44–3.98)
WBC # BLD AUTO: 9.1 X10*3/UL (ref 4.4–11.3)

## 2024-01-10 PROCEDURE — 36415 COLL VENOUS BLD VENIPUNCTURE: CPT

## 2024-01-10 PROCEDURE — 80053 COMPREHEN METABOLIC PANEL: CPT

## 2024-01-10 PROCEDURE — 85025 COMPLETE CBC W/AUTO DIFF WBC: CPT

## 2024-01-10 PROCEDURE — 82024 ASSAY OF ACTH: CPT

## 2024-01-10 PROCEDURE — 99214 OFFICE O/P EST MOD 30 MIN: CPT | Performed by: INTERNAL MEDICINE

## 2024-01-10 PROCEDURE — 84443 ASSAY THYROID STIM HORMONE: CPT

## 2024-01-10 PROCEDURE — 1036F TOBACCO NON-USER: CPT | Performed by: INTERNAL MEDICINE

## 2024-01-10 PROCEDURE — 82533 TOTAL CORTISOL: CPT

## 2024-01-10 ASSESSMENT — ENCOUNTER SYMPTOMS
NUMBNESS: 0
DIAPHORESIS: 0
SWOLLEN GLANDS: 0
ANOREXIA: 0
VISUAL CHANGE: 0
NECK PAIN: 0
FEVER: 0
HEADACHES: 0
ARTHRALGIAS: 1
FATIGUE: 0
NAUSEA: 0
COUGH: 0
VERTIGO: 0
JOINT SWELLING: 0
MYALGIAS: 0
CHILLS: 0
CHANGE IN BOWEL HABIT: 0
SORE THROAT: 0
WEAKNESS: 0
VOMITING: 0
ABDOMINAL PAIN: 0

## 2024-01-10 ASSESSMENT — PAIN SCALES - GENERAL: PAINLEVEL: 7

## 2024-01-10 NOTE — PROGRESS NOTES
Patient ID: Janette Panda is a 38 y.o. female.    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 PROBABLE Non-small cell lung cancer (poorly differentiated squamous?) Date of diagnosis is August 13, 2020 from a core biopsy of the left posterolateral chest wall mass.  Pathology  reports extensively necrotic epithelioid proliferation in a fibrotic background.  Tumor cells were somewhat rhabdoid in appearance with hyperchromatic, eccentric, moderately to markedly atypical nuclei some with big nuclei and eosinophilic cytoplasm.   By immunohistochemistry tumors were positive for keratin AE1/AE3, CAM 5.2, CK7, calretinin and KATIE-3 and negative for CK20 WT 1, P 40, desmin, SOX-10, CD34, TTF-1, CDX 2, PAX 8, estrogen receptor.  I and I immunostain retained nuclear expression.  The  differential diagnosis was a malignant epithelioid neoplasm including poorly differentiated carcinoma and mesothelioma.        STAGING     Stage IV disease, T1b (1.7 cm nodule within the right upper lobe), NX, M1C         CURRENT SITES OF DISEASE     RUL, left chest wall pleural based, left pleura, right infraclavicular LN, bilateral adrenal glands, right foot metatarsal bone  MRI of brain negative for malignancy        MOLECULAR GENOMICS     Insufficient tissue fotr NGS     1- CancerType ID Molecular Cancer :     Tumor type indeterminate. Cannot be excluded Squamous Cell 35%, Pancreatobiliary 31%, Ovary <5%     Ruled out with 95% confidence: adrenal, brain, cervix and endometrial adenoca, GE adeno, GIST, Germcell     Salivary, colorectal, Kidney, Hepatocellular, lung adeno, lymphoma, melanoma, mesotheliona, neuroendocrin     (including MERKEL and small cell), sarcoma, thymus, thyroid, bladder     2- TEMPUS ctDNA: KRAS Q61H - 44.5% allelic frequency                             CDKN2A H83Y Missense varialt - LOF - allelic frequency 22%                             TP53 - R175H missense variant  LOF                             ARID1A LOF allelic frequency 24%                             MSI high not detected                             MEdian allelic frquency 23.4%     3- PD-L1 IHC TPS: 90%        SERUM TUMOR MARKER     Normal CEA and CA 19.9 in September 2020        PRIOR THERAPY     1- XRT to left chest wall.  2- Palliative XRT to right foot  3- Single agent Keytruda (refused chemotherapy) based on high PD-L1 expression starting Nov 2, 2020. Clinical improvement, radiographic stable disease/pseudoprogression initially; evidence of response on imaging of April 2021.  Completed 2 years of treatment  in October 2022           CURRENT THERAPY     1- Supportive Oncology for symptom management  2- Observation        CURRENT ONCOLOGICAL PROBLEMS     1-severe chest pain related to her left chest wall mass - resolved  2-severe constipation - improved  3-scan on 11/15/2021 showed ground glass opacities, thought by radiology to represent pneumonitis- patient is asymptomatic.  Will hold treatment 11/22/2021 and re-assess in 3 weeks  3-anorexia and losing weight - much improved  4-anxiety - improved  5- Right foot pain from metastasis - improved . Right calcaneal surgery for malunion on September 8, 2022 postoperative infection.  Antibiotic treatments in December 2022. She has required to further right calcaneus wound irrigation debridement, removal  of implants operation on December 15, 2022 as well as further surgery on March 17, 2023.  She is required IV antibiotics for osteomyelitis and followed by infectious diseases.  Bone cultures with multiple organisms.  Osteomyelitis  6-no birth control at present- will use protection if she has sex, aware she should avoid pregnancy- re-reviewed on 4/13/2022        HISTORY OF PRESENT ILLNESS     This is a very nice 38-year-old patient.  She went to the [facility] [place] emergency room on August 12, 2020 with severe back pain and a large lump that was painful.  When asking  her when this started she states approximately 1 to 2 weeks prior to  this although she had been losing weight for several months and was having some on and off pain there when questioning her more carefully.  She underwent a imaging studies with a CT scan of the chest abdomen and pelvis on August 13, 2020 which demonstrated  a 1.7 cm opacity within the right upper lobe felt to be most likely extrapulmonary arising from the pleura or chest wall.  The mass had a lobulated appearance with a small focus of macroscopic fat but no associated calcification.  Furthermore a 2.7 cm  rim-enhancing hypodense lesion with surrounding inflammatory changes was seen within the posterior and lateral aspect of the left 9th-10th rib.  Within the abdomen a 2.9 cm heterogeneous nodule was seen within the right adrenal gland.  She undergoes a  CT-guided biopsy and fine-needle aspiration on August 13, 2020 showing malignant cells along the posterolateral chest wall mass on the left side.  The pathology as described above.  She was seen initially by medical oncology on September 4, 2020 but referred  to my clinic for further evaluation on the day of this visit on September 16, 2020 given the unclear site of origin and pathology.        PAST MEDICAL HISTORY     1- Appendectomy  2- Kidney stones        SOCIAL HISTORY     Patient is 35 years old, she is single, she has no children, she lives with her mother and sister, she lives in [facility].  She started at the age of 14 smoking and currently still smokes at the age of 35.  Smokes on average 2 packs a day for the  past 20 years.  She works in manufacturing        CURRENT MEDS     See med list        ALLERGIES     Tetracycline        FAMILY HISTORY     Mother had stage III ovarian cancer at the age of 51.  Grandmother on the father's side had 2 cancers one in the breast and one in the lung.       Subjective    Cancer  Associated symptoms include arthralgias. Pertinent negatives include  no abdominal pain, anorexia, change in bowel habit, chest pain, chills, congestion, coughing, diaphoresis, fatigue, fever, headaches, joint swelling, myalgias, nausea, neck pain, numbness, rash, sore throat, swollen glands, urinary symptoms, vertigo, visual change, vomiting or weakness.     Patient is clinically doing well.  She finished her antibiotics about 2 weeks ago.  Is due to see infectious diseases in the next few days.  The decision on what to do with her PICC line will be made at that time, urinating well bowel movements regular, right foot pain is mild well-controlled, no new headaches, no shortness of breath cough hemoptysis.    Objective    BSA: 1.81 meters squared  /66 (BP Location: Left arm, Patient Position: Sitting, BP Cuff Size: Adult)   Pulse 81   Temp 36.2 °C (97.2 °F) (Temporal)   Resp 18   Wt 71.3 kg (157 lb 3 oz)   SpO2 100%   BMI 26.16 kg/m²      Physical Exam  Constitutional:       Appearance: She is not ill-appearing, toxic-appearing or diaphoretic.   HENT:      Mouth/Throat:      Mouth: Mucous membranes are moist.      Pharynx: Oropharynx is clear. No oropharyngeal exudate.   Eyes:      General: No scleral icterus.     Conjunctiva/sclera: Conjunctivae normal.   Cardiovascular:      Pulses: Normal pulses.      Heart sounds: Normal heart sounds. No murmur heard.     No friction rub. No gallop.   Pulmonary:      Effort: Pulmonary effort is normal.      Breath sounds: Normal breath sounds.   Abdominal:      General: Abdomen is flat. Bowel sounds are normal. There is no distension.      Palpations: There is no mass.      Tenderness: There is no guarding or rebound.   Musculoskeletal:      Cervical back: No tenderness.   Lymphadenopathy:      Cervical: No cervical adenopathy.   Skin:     General: Skin is warm.      Coloration: Skin is not jaundiced or pale.      Findings: No bruising or erythema.   Neurological:      General: No focal deficit present.      Mental Status: She is  oriented to person, place, and time.   Psychiatric:         Mood and Affect: Mood normal.         Behavior: Behavior normal.         Performance Status:  Symptomatic; fully ambulatory    CT Chest 1/9/2024  IMPRESSION:  1. Scattered subcentimeter pulmonary nodules within the upper lung  zones as seen on prior imaging. There is a 4 mm pulmonary nodule at  the right lung apex demonstrating slight ground-glass appearance  appearing slightly more conspicuous.      2. Pleural-based pulmonary nodule at the left lung base laterally  stable from prior imaging measuring 5 x 10 mm. Surrounding  ground-glass airspace infiltrate and septal thickening as seen on  prior imaging with component of postradiation change      3. Remote left posterolateral 8th and 9th rib fracture deformity  component which may reflect postradiation change        Assessment/Plan     This is a very nice 38-year-old patient with metastatic non-small cell carcinoma, possibly a poorly differentiated squamous, that was diagnosed in August 2020 with a high PD-L1 expression of 90% treated with single agent immunotherapy with a complete response.  She completed her 2 years of treatment in October 2022 and now has been off treatment for 15 months without evidence of disease progression.  She is clinically doing well, there is no evidence of disease progression, I personally reviewed her most recent CT scan.  We will obtain blood working including endocrine blood work today for long-term management prior immunotherapy.  She will see us back in 2 months time.  Her current schedule of visits are visits with every 2 months and imaging every 4 months.      Cancer Staging   No matching staging information was found for the patient.    Oncology History    No history exists.                 Thaddeus Mayen MD

## 2024-01-11 ENCOUNTER — TELEPHONE (OUTPATIENT)
Dept: ADMISSION | Facility: HOSPITAL | Age: 39
End: 2024-01-11

## 2024-01-11 ENCOUNTER — TELEMEDICINE (OUTPATIENT)
Dept: INFECTIOUS DISEASES | Facility: CLINIC | Age: 39
End: 2024-01-11
Payer: COMMERCIAL

## 2024-01-11 DIAGNOSIS — M86.10: Primary | ICD-10-CM

## 2024-01-11 PROCEDURE — 99214 OFFICE O/P EST MOD 30 MIN: CPT | Performed by: INTERNAL MEDICINE

## 2024-01-11 PROCEDURE — 1036F TOBACCO NON-USER: CPT | Performed by: INTERNAL MEDICINE

## 2024-01-11 RX ORDER — SULFAMETHOXAZOLE AND TRIMETHOPRIM 800; 160 MG/1; MG/1
1 TABLET ORAL 2 TIMES DAILY
Qty: 180 TABLET | Refills: 1 | Status: SHIPPED | OUTPATIENT
Start: 2024-01-11 | End: 2024-04-10

## 2024-01-11 NOTE — PROGRESS NOTES
Infectious Diseases Clinic Follow-up:    Reason for Visit:   Chief Complaint   Patient presents with    Follow-up     OM of Calcaneus following sugery.       History of Current Issue  Janette Panda is a 38 y.o. year old female  who I have been following for om of the calcaneous, see A/P for full details    PAST MEDICAL HISTORY:  Past Medical History:   Diagnosis Date    Personal history of other diseases of the respiratory system 01/19/2021    History of sinus problem       PAST SURGICAL HISTORY:  Past Surgical History:   Procedure Laterality Date    OTHER SURGICAL HISTORY  09/15/2020    Appendectomy laparoscopic       ALLERGIES:    Allergies   Allergen Reactions    Amitriptyline Itching and Other     nausea    Tetracyclines Nausea/vomiting     Vomiting     Adhesive Tape-Silicones Itching    Latex Unknown    Other Itching and Other     Tegaderm Absorbent Dressing -itching  [id number] Standard - blisters and rash    Tramadol Other     N/V       MEDICATIONS:      Current Outpatient Medications:     calcium carbonate-vitamin D3 (Calcium 600 with Vitamin D3) 600 mg-10 mcg (400 unit) chewable tablet, Calcium 600/Vitamin D 600-10 MG-MCG Oral Tablet Chewable Quantity: 60  Refills: 0  Start: 24-Jul-2023, Disp: , Rfl:     ELDERBERRY FRUIT ORAL, Take 1 capsule by mouth once daily., Disp: , Rfl:     gabapentin (Neurontin) 300 mg capsule, Take 1 capsule (300 mg) by mouth 3 times a day as needed (nerve pain)., Disp: 270 capsule, Rfl: 3    montelukast (Singulair) 10 mg tablet, Take 1 tablet (10 mg) by mouth once daily., Disp: , Rfl:     NON FORMULARY, Moringa supplement 2 cap(s) orally once a day, Disp: , Rfl:     NON FORMULARY, soursop 1500 mg capsule 2 cap(s) orally once a day, Disp: , Rfl:     NON FORMULARY, mullein capsules 2 cap(s) orally once a day, Disp: , Rfl:     NON FORMULARY, sea singleton capsules 2 cap(s) orally once a day, Disp: , Rfl:     oxyCODONE (Roxicodone) 10 mg immediate release tablet, Take 1 tablet (10 mg) by  mouth every 6 hours if needed for severe pain (7 - 10). 1 TO 2 TIMES A DAY, AS NEEDED FOR SEVERE BREAKTHROUGH PAIN, Disp: 60 tablet, Rfl: 0    Symbicort 80-4.5 mcg/actuation inhaler, Symbicort 80-4.5 MCG/ACT Inhalation Aerosol Quantity: 10  Refills: 0  Start: 28-Dec-2022, Disp: , Rfl:     Tylenol Extra Strength 500 mg tablet, Take 1-2 tablets (500-1,000 mg) by mouth every 8 hours if needed for mild pain (1 - 3) or moderate pain (4 - 6)., Disp: , Rfl:     albuterol 90 mcg/actuation inhaler, Inhale 1 puff if needed., Disp: , Rfl:     calcium carb,lactate/vit D3 (CALCET PETITES ORAL), Take 1 tablet by mouth twice a day. Calcet 500 mg-400 intl units (10 mcg) oral tablet, chewable, Disp: , Rfl:     cyclobenzaprine (Flexeril) 5 mg tablet, Take 1 tablet (5 mg) by mouth 3 times a day as needed., Disp: , Rfl:     ertapenem (INVanz) 1 gram injection, Ertapenem Sodium 1 GM Injection Solution Reconstituted Quantity: 6  Refills: 0  Start: 22-Jul-2023, Disp: , Rfl:     folic acid 20 mg capsule, Folic Acid CAPS  Refills: 0     Active, Disp: , Rfl:     ibuprofen 800 mg tablet, Ibuprofen 800 MG Oral Tablet Refills: 0, Disp: , Rfl:     ipratropium (Atrovent) 21 mcg (0.03 %) nasal spray, Administer 2 sprays into each nostril 3 times a day as needed., Disp: , Rfl:     omeprazole (PriLOSEC) 10 mg DR capsule, Take 1 capsule (10 mg) by mouth once daily. while taking aspirin, Disp: , Rfl:     ondansetron (Zofran) 4 mg tablet, Take 2 tablets (8 mg) by mouth every 6 hours if needed for nausea., Disp: , Rfl:     phenylephrine (Sudafed PE) 10 mg tablet, Take 1 tablet (10 mg) by mouth every 4 hours if needed., Disp: , Rfl:     sennosides-docusate sodium (Zohreh-Colace) 8.6-50 mg tablet, Take 1 tablet by mouth as needed at bedtime for constipation., Disp: , Rfl:     REVIEW OF SYSTEMS:    Review of Systems   Constitutional: Negative.    HENT: Negative.     Eyes: Negative.    Respiratory: Negative.     Cardiovascular: Negative.    Gastrointestinal:  Negative.    Endocrine: Negative.    Genitourinary: Negative.    Musculoskeletal: Negative.    Skin: Negative.    Allergic/Immunologic: Negative.    Neurological: Negative.    Hematological: Negative.    Psychiatric/Behavioral: Negative.         PHYSICAL EXAMINATION:     Visit Vitals  Smoking Status Former        EXAM:   Physical Exam  Vitals reviewed.   Constitutional:       Appearance: Normal appearance.   HENT:      Head: Normocephalic and atraumatic.      Mouth/Throat:      Mouth: Mucous membranes are moist.      Pharynx: Oropharynx is clear.   Cardiovascular:      Rate and Rhythm: Normal rate and regular rhythm.   Pulmonary:      Effort: Pulmonary effort is normal.   Abdominal:      General: Abdomen is flat. Bowel sounds are normal.      Palpations: Abdomen is soft.   Musculoskeletal:         General: Swelling and tenderness present. Normal range of motion.      Cervical back: Normal range of motion.      Comments: RLE heel with dressing   Skin:     General: Skin is warm.   Neurological:      General: No focal deficit present.      Mental Status: She is alert and oriented to person, place, and time.   Psychiatric:         Mood and Affect: Mood normal.         Behavior: Behavior normal.         Thought Content: Thought content normal.         Judgment: Judgment normal.          ASSESSMENT / RECOMMENDATIONS:  Patient is a 37-year-old female with PMHx lung malignancy w/mets to R ankle c/b recurrent osteomyelitis s/p right Achilles tendon repair and multiple I&D/saucerizations, now s/p R calc I&D and saucerization on 7/19. ID is consulted for long term antibiotic recommendations.     Microbiology  -12/15/22: BCX with CoNS in 1/4 vials, determined to be likely contaminant  -12/15/22: calcaneal wound cx grew Serratia, Pseudomonas (both S=cipro, cefepime)  -3/16/23: MRSA nares negative  -3/16/23: calcaneal wound cx (3/3 sets) grew Serratia (S=Cipro, cefepime, levo; R=cefazolin)  -7/16/23: blood cx x2 sets â€“  NGTD  -7/17/23: blood cx x2 sets â€“ NGTD  -7/19/23: calcaneal wound (1/3 sets) gram stain showing gram positive cocci, pending cx     Antibiotics  Cefepime (8 week course, completed 2/23/23)  Cefepime (3/19/23 â€“ 4/6/23)  Levofloxacin (4/6/23 - completed 4/27/23)  Ceftriaxone (7/16)  Vancomycin (7/16 â€“ 7/17)  Zosyn (7/17)  Cefazolin (7/19 - )     Assessment  #Acute on Chronic Osteomyelitis s/p multiple surgical interventions and abx courses  #S/p R calcaneal I&D and saucerization 7/19  :: Followed outpatient by Dr. Omer  :: XR ankle foot (7/17) s/f extensive osteolysis which appears slightly progressed relative to prior calcaneal radiography and markedly progressed relative to priorfoot radiography, compatible with worsening acute on chronic osteomyelitis.  Pt initially had R foot pain from metastasis s/p palliative RT and right calcaneal surgery for malunion in 9/2022. This was complicated by serratia and pseudomonas osteomyelitis in 12/2022, which was treated with I&D, saucerization, and 2 months of cefepime. 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 Now currently presenting with acute on chronic osteomyelitis, drainage from wound, s/p OR on 7/19 for I&D and R calcaneal saucerization.     Gram stain of wound (7/19) positive for gram positive cocci, pending culture results. Unclear what organisms may be causing patient's osteomyelitis/STI. Possible that infection is polymicrobial. Also cannot r/o persistent infection by Serratia and Pseudomonas given previous positive bone cultures and hx of persistent serratia in calcaneal culture after 8 weeks of IV antibiotics. For now, pending culture results to determine optimal abx regimen and continuing broad spectrum abx coverage. Given patient's refusal of PICC placement and possibility of  leaving AMA, would start bactrim OP.        7/21/23  1) Continue vanc/zosyn pending cultures  2) Advise that patient stays until cultures have resulted  3) If there continues to be difficulty establishing access, patient leaves AMA, or pt discharged early, start bactrim DS BID for 4 weeks  4) Follow-up with Dr. Omer (pt's ID provider) in 4 weeks to determine whether abx should be continued. We will schedule appointment.     UPDATE 10/26/2023  Tolerating Abxs well but healing not yet complete  Will need at least another 6 weeks of ABXs    Recommendations  ---Continue ertapenem 1 g IV every 24 hours for an additional 2-4 weeks, will determine EOT based on progress.   ---Will eventually need Bactrim for chronic suppression.  ---Weekly labs including CBC with diff, BMP and CRP and fax labs to my office (fax : [phone]).  RTC in 1-2 mo to reasess        Pt is in agreement with plan     I spent 30 minutes in the professional and overall care of this patient.          José Miguel Omer MD MPH

## 2024-01-11 NOTE — TELEPHONE ENCOUNTER
Pt requesting a refill of oxycodone 10mg every 6hrs prn, #60.  Last prescription written 12/8/23.  Preferred pharmacy is [facility] at [address].

## 2024-01-12 LAB — ACTH PLAS-MCNC: 12.7 PG/ML (ref 7.2–63.3)

## 2024-01-19 ENCOUNTER — TELEPHONE (OUTPATIENT)
Dept: INFECTIOUS DISEASES | Facility: HOSPITAL | Age: 39
End: 2024-01-19
Payer: COMMERCIAL

## 2024-01-19 NOTE — TELEPHONE ENCOUNTER
Ms. Panda called to report nursing will not remove her PICC because she has been discharged. Upon calling [facility], Tito, pharmacist, explained that nursing recently discharged her because she was not responding to their attempts to remove the PICC. At this point she will need to go to IR to have the PICC removed. Please place an order into her chart so the process can start as soon as possible.

## 2024-01-22 ENCOUNTER — TELEPHONE (OUTPATIENT)
Dept: ADMISSION | Facility: HOSPITAL | Age: 39
End: 2024-01-22
Payer: COMMERCIAL

## 2024-01-22 NOTE — TELEPHONE ENCOUNTER
Patient called back again asking can her picc line be removed. She is very upset and wants to speak with you regarding this issue.

## 2024-01-22 NOTE — TELEPHONE ENCOUNTER
Patient states she has called infectious disease multiple times to arrange having PICC line removed. She has not received a call back. She is asking if Dr Mayen's team can assist in any way, can order be placed for consult to expedite?

## 2024-01-23 DIAGNOSIS — C34.90 ADENOCARCINOMA, LUNG, UNSPECIFIED LATERALITY (MULTI): Primary | ICD-10-CM

## 2024-01-23 NOTE — TELEPHONE ENCOUNTER
Left generic voicemail for patient to call office. Lilliam placed order for RN Exam visit for her to have PICC removed. She can contact scheduling to have that arranged in infusion

## 2024-01-28 ENCOUNTER — HOSPITAL ENCOUNTER (EMERGENCY)
Facility: HOSPITAL | Age: 39
Discharge: HOME | End: 2024-01-28
Payer: COMMERCIAL

## 2024-01-28 VITALS
HEART RATE: 89 BPM | TEMPERATURE: 97.7 F | HEIGHT: 66 IN | SYSTOLIC BLOOD PRESSURE: 123 MMHG | DIASTOLIC BLOOD PRESSURE: 87 MMHG | OXYGEN SATURATION: 99 % | WEIGHT: 165 LBS | RESPIRATION RATE: 18 BRPM | BODY MASS INDEX: 26.52 KG/M2

## 2024-01-28 DIAGNOSIS — Z45.2 PICC (PERIPHERALLY INSERTED CENTRAL CATHETER) REMOVAL: Primary | ICD-10-CM

## 2024-01-28 PROCEDURE — 99281 EMR DPT VST MAYX REQ PHY/QHP: CPT

## 2024-01-28 ASSESSMENT — COLUMBIA-SUICIDE SEVERITY RATING SCALE - C-SSRS
2. HAVE YOU ACTUALLY HAD ANY THOUGHTS OF KILLING YOURSELF?: NO
6. HAVE YOU EVER DONE ANYTHING, STARTED TO DO ANYTHING, OR PREPARED TO DO ANYTHING TO END YOUR LIFE?: NO
1. IN THE PAST MONTH, HAVE YOU WISHED YOU WERE DEAD OR WISHED YOU COULD GO TO SLEEP AND NOT WAKE UP?: NO

## 2024-01-28 ASSESSMENT — PAIN SCALES - GENERAL: PAINLEVEL_OUTOF10: 0 - NO PAIN

## 2024-01-28 ASSESSMENT — PAIN - FUNCTIONAL ASSESSMENT
PAIN_FUNCTIONAL_ASSESSMENT: 0-10
PAIN_FUNCTIONAL_ASSESSMENT: 0-10

## 2024-01-28 NOTE — ED PROVIDER NOTES
"This is a 38-year-old female with a past medical history of osteomyelitis of her right calcaneus following surgery who presents to the ED requesting her PICC line be removed.  She states that she had a recurrence of her osteomyelitis and had this PICC line placed in February 2023 and has been in place ever since then.  She states that she finished her IV antibiotics and December.  She is currently on oral Bactrim.  She states that the PICC line has not been used in over a month.  She denies any pain at the PICC line site.  She denies any purulent drainage or bleeding.  She states that she was previously being followed by an agency that will come to her house to give her her IV antibiotics, however she was discharged in the agency's care after the IV antibiotics were finished and the PICC line was not removed show she was sent here to have the PICC line removed.  She states overall she is feeling well.  She denies any problems with the right heel and states it has been feeling improved.      History provided by:  Patient and medical records   used: No             Visit Vitals  /87   Pulse 89   Temp 36.5 °C (97.7 °F)   Resp 18   Ht 1.67 m (5' 5.75\")   Wt 74.8 kg (165 lb)   SpO2 99%   BMI 26.84 kg/m²   Smoking Status Former   BSA 1.86 m²          Physical Exam     Physical exam:   General: Vitals noted, no distress. Afebrile.   EENT:  Hearing grossly intact. Normal phonation. MMM. Airway patient. PERRL. EOMI.   Neck: No midline tenderness or paraspinal tenderness. FROM.   Cardiac: Regular, rate, rhythm. Normal S1 and S2.  No murmurs, gallops, rubs.   Pulmonary: Good air exchange. Lungs clear bilaterally. No wheezes, rhonchi, rales. No accessory muscle use.   Extremities: No peripheral edema.  Full range of motion. Moves all extremities freely.  PICC line in place to left upper extremity without any surrounding erythema, edema, or excess warmth around the line.  No purulent drainage.  No " bleeding.  No tenderness throughout extremities.  Specifically no tenderness to the right heel.  Hyperpigmentation of the posterior aspect of the heel without any associate erythema or excess warmth.  No areas of fluctuance.  No open wounds.  Skin: No rash. Warm and Dry.   Neuro: No focal neurologic deficits. CN 2-12 grossly intact. Sensation equal bilaterally. No weakness.         Labs Reviewed - No data to display    No orders to display         ED Course & MDM     Medical Decision Making  This is a 38-year-old female with past medical history of osteomyelitis of her right heel who presents to the ED for PICC line removal.  Vital stable upon arrival to the ED.  On examination she did have a PICC line present to the left upper extremity without any evidence of infection.  The skin to the right heel does not appear to be acutely infected at this time.  She had no associated tenderness.  I did review the patient's records which shows that she has been instructed to report to the ED for PICC line removal.  I removed the patient's PICC line at bedside without difficulty.  Pressure was held to the area and there was no bleeding following the removal.  The site was wrapped with 4 x 4's, Kerlix, and coban.  Patient tolerated procedure well.  She was observed for another 15 minutes without any bleeding of the wound and was discharged from the ED in stable condition with instructions to follow-up with her primary care provider as an outpatient.              Procedures    RICK Edwards, LESLY Stanley PA-C  01/28/24 1039

## 2024-01-28 NOTE — ED TRIAGE NOTES
PT REPORTS TO ED REQUESTING PICC LINE REMOVAL, PT WAS ON IV ABX FOR OSTEOMYELITIS AND IS NOW ON PO ABX, DENIES DRAINAGE/REDNESS/PAIN

## 2024-02-27 ENCOUNTER — HOSPITAL ENCOUNTER (OUTPATIENT)
Dept: RADIOLOGY | Facility: HOSPITAL | Age: 39
Discharge: HOME | End: 2024-02-27
Payer: COMMERCIAL

## 2024-02-27 ENCOUNTER — OFFICE VISIT (OUTPATIENT)
Dept: ORTHOPEDIC SURGERY | Facility: HOSPITAL | Age: 39
End: 2024-02-27
Payer: COMMERCIAL

## 2024-02-27 DIAGNOSIS — S92.001G CLOSED DISPLACED FRACTURE OF RIGHT CALCANEUS WITH DELAYED HEALING, UNSPECIFIED PORTION OF CALCANEUS, SUBSEQUENT ENCOUNTER: ICD-10-CM

## 2024-02-27 DIAGNOSIS — M86.171 ACUTE OSTEOMYELITIS OF RIGHT CALCANEUS (MULTI): Primary | ICD-10-CM

## 2024-02-27 PROCEDURE — 99214 OFFICE O/P EST MOD 30 MIN: CPT | Performed by: ORTHOPAEDIC SURGERY

## 2024-02-27 PROCEDURE — 1036F TOBACCO NON-USER: CPT | Performed by: ORTHOPAEDIC SURGERY

## 2024-02-27 PROCEDURE — 73650 X-RAY EXAM OF HEEL: CPT | Mod: RT

## 2024-03-06 ENCOUNTER — OFFICE VISIT (OUTPATIENT)
Dept: HEMATOLOGY/ONCOLOGY | Facility: HOSPITAL | Age: 39
End: 2024-03-06
Payer: COMMERCIAL

## 2024-03-06 VITALS
BODY MASS INDEX: 25.71 KG/M2 | SYSTOLIC BLOOD PRESSURE: 105 MMHG | TEMPERATURE: 96.8 F | DIASTOLIC BLOOD PRESSURE: 71 MMHG | HEART RATE: 68 BPM | RESPIRATION RATE: 16 BRPM | OXYGEN SATURATION: 98 % | WEIGHT: 158.07 LBS

## 2024-03-06 DIAGNOSIS — C34.11 MALIGNANT NEOPLASM OF UPPER LOBE OF RIGHT LUNG (MULTI): ICD-10-CM

## 2024-03-06 DIAGNOSIS — C34.90 ADENOCARCINOMA, LUNG, UNSPECIFIED LATERALITY (MULTI): ICD-10-CM

## 2024-03-06 PROCEDURE — 99214 OFFICE O/P EST MOD 30 MIN: CPT | Performed by: CLINICAL NURSE SPECIALIST

## 2024-03-06 PROCEDURE — 1036F TOBACCO NON-USER: CPT | Performed by: CLINICAL NURSE SPECIALIST

## 2024-03-06 RX ORDER — OXYCODONE HYDROCHLORIDE 10 MG/1
10 TABLET ORAL EVERY 6 HOURS PRN
Qty: 60 TABLET | Refills: 0 | Status: SHIPPED | OUTPATIENT
Start: 2024-03-06 | End: 2024-04-09 | Stop reason: SDUPTHER

## 2024-03-06 ASSESSMENT — ENCOUNTER SYMPTOMS
CHANGE IN BOWEL HABIT: 0
JOINT SWELLING: 0
ABDOMINAL PAIN: 0
VOMITING: 0
ARTHRALGIAS: 1
COUGH: 0
CHILLS: 0
WEAKNESS: 0
FEVER: 0
VISUAL CHANGE: 0
NECK PAIN: 0
VERTIGO: 0
ANOREXIA: 0
MYALGIAS: 0
NAUSEA: 0
SWOLLEN GLANDS: 0
FATIGUE: 0
SORE THROAT: 0
NUMBNESS: 0
DIAPHORESIS: 0
HEADACHES: 0

## 2024-03-06 ASSESSMENT — PAIN SCALES - GENERAL: PAINLEVEL: 7

## 2024-03-06 NOTE — PROGRESS NOTES
Patient ID: Janette Panda is a 38 y.o. female.    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 PROBABLE Non-small cell lung cancer (poorly differentiated squamous?) Date of diagnosis is August 13, 2020 from a core biopsy of the left posterolateral chest wall mass.  Pathology  reports extensively necrotic epithelioid proliferation in a fibrotic background.  Tumor cells were somewhat rhabdoid in appearance with hyperchromatic, eccentric, moderately to markedly atypical nuclei some with big nuclei and eosinophilic cytoplasm.   By immunohistochemistry tumors were positive for keratin AE1/AE3, CAM 5.2, CK7, calretinin and KATIE-3 and negative for CK20 WT 1, P 40, desmin, SOX-10, CD34, TTF-1, CDX 2, PAX 8, estrogen receptor.  I and I immunostain retained nuclear expression.  The  differential diagnosis was a malignant epithelioid neoplasm including poorly differentiated carcinoma and mesothelioma.        STAGING     Stage IV disease, T1b (1.7 cm nodule within the right upper lobe), NX, M1C         CURRENT SITES OF DISEASE     RUL, left chest wall pleural based, left pleura, right infraclavicular LN, bilateral adrenal glands, right foot metatarsal bone  MRI of brain negative for malignancy        MOLECULAR GENOMICS     Insufficient tissue fotr NGS     1- CancerType ID Molecular Cancer :     Tumor type indeterminate. Cannot be excluded Squamous Cell 35%, Pancreatobiliary 31%, Ovary <5%     Ruled out with 95% confidence: adrenal, brain, cervix and endometrial adenoca, GE adeno, GIST, Germcell     Salivary, colorectal, Kidney, Hepatocellular, lung adeno, lymphoma, melanoma, mesotheliona, neuroendocrin     (including MERKEL and small cell), sarcoma, thymus, thyroid, bladder     2- TEMPUS ctDNA: KRAS Q61H - 44.5% allelic frequency                             CDKN2A H83Y Missense varialt - LOF - allelic frequency 22%                             TP53 - R175H missense variant  LOF                             ARID1A LOF allelic frequency 24%                             MSI high not detected                             MEdian allelic frquency 23.4%     3- PD-L1 IHC TPS: 90%        SERUM TUMOR MARKER     Normal CEA and CA 19.9 in September 2020        PRIOR THERAPY     1- XRT to left chest wall.  2- Palliative XRT to right foot  3- Single agent Keytruda (refused chemotherapy) based on high PD-L1 expression starting Nov 2, 2020. Clinical improvement, radiographic stable disease/pseudoprogression initially; evidence of response on imaging of April 2021.  Completed 2 years of treatment  in October 2022           CURRENT THERAPY     1- Supportive Oncology for symptom management  2- Observation        CURRENT ONCOLOGICAL PROBLEMS     1-severe chest pain related to her left chest wall mass - resolved  2-severe constipation - improved  3-scan on 11/15/2021 showed ground glass opacities, thought by radiology to represent pneumonitis- patient is asymptomatic.  Will hold treatment 11/22/2021 and re-assess in 3 weeks  3-anorexia and losing weight - much improved  4-anxiety - improved  5- Right foot pain from metastasis - improved . Right calcaneal surgery for malunion on September 8, 2022 postoperative infection.  Antibiotic treatments in December 2022. She has required to further right calcaneus wound irrigation debridement, removal  of implants operation on December 15, 2022 as well as further surgery on March 17, 2023.  She is required IV antibiotics for osteomyelitis and followed by infectious diseases.  Bone cultures with multiple organisms.  Osteomyelitis  6-no birth control at present- will use protection if she has sex, aware she should avoid pregnancy- re-reviewed on 4/13/2022        HISTORY OF PRESENT ILLNESS     This is a very nice 38-year-old patient.  She went to the [facility] emergency room on August 12, 2020 with severe back pain and a large lump that was painful.  When asking  her when this started she states approximately 1 to 2 weeks prior to  this although she had been losing weight for several months and was having some on and off pain there when questioning her more carefully.  She underwent a imaging studies with a CT scan of the chest abdomen and pelvis on August 13, 2020 which demonstrated  a 1.7 cm opacity within the right upper lobe felt to be most likely extrapulmonary arising from the pleura or chest wall.  The mass had a lobulated appearance with a small focus of macroscopic fat but no associated calcification.  Furthermore a 2.7 cm  rim-enhancing hypodense lesion with surrounding inflammatory changes was seen within the posterior and lateral aspect of the left 9th-10th rib.  Within the abdomen a 2.9 cm heterogeneous nodule was seen within the right adrenal gland.  She undergoes a  CT-guided biopsy and fine-needle aspiration on August 13, 2020 showing malignant cells along the posterolateral chest wall mass on the left side.  The pathology as described above.  She was seen initially by medical oncology on September 4, 2020 but referred  to my clinic for further evaluation on the day of this visit on September 16, 2020 given the unclear site of origin and pathology.        PAST MEDICAL HISTORY     1- Appendectomy  2- Kidney stones        SOCIAL HISTORY     Patient is 35 years old, she is single, she has no children, she lives with her mother and sister, she lives in [facility].  She started at the age of 14 smoking and currently still smokes at the age of 35.  Smokes on average 2 packs a day for the  past 20 years.  She works in manufacturing        CURRENT MEDS     See med list        ALLERGIES     Tetracycline        FAMILY HISTORY     Mother had stage III ovarian cancer at the age of 51.  Grandmother on the father's side had 2 cancers one in the breast and one in the lung.       Subjective    Today I am meeting with Janette Panda- she reports that she is finally off  antibiotics for her foot and her PICC line has been pulled.  She also reports that she has had some increased drainage of her foot, and it is clear, but she is following closely with ortho and ID.  She is feeling well, overall- still bothered by pain in her foot at the site of the infection.  She mostly manages it with NSAIDS but does take oxycodone occasionally- she is out of it today.      Cancer  Associated symptoms include arthralgias. Pertinent negatives include no abdominal pain, anorexia, change in bowel habit, chest pain, chills, congestion, coughing, diaphoresis, fatigue, fever, headaches, joint swelling, myalgias, nausea, neck pain, numbness, rash, sore throat, swollen glands, urinary symptoms, vertigo, visual change, vomiting or weakness.       Objective    BSA: 1.82 meters squared  /71 (BP Location: Right arm, Patient Position: Sitting)   Pulse 68   Temp 36 °C (96.8 °F)   Resp 16   Wt 71.7 kg (158 lb 1.1 oz)   SpO2 98%   BMI 25.71 kg/m²      Physical Exam  Constitutional:       Appearance: She is not ill-appearing, toxic-appearing or diaphoretic.   HENT:      Mouth/Throat:      Mouth: Mucous membranes are moist.      Pharynx: Oropharynx is clear. No oropharyngeal exudate.   Eyes:      General: No scleral icterus.     Conjunctiva/sclera: Conjunctivae normal.   Cardiovascular:      Pulses: Normal pulses.      Heart sounds: Normal heart sounds. No murmur heard.     No friction rub. No gallop.   Pulmonary:      Effort: Pulmonary effort is normal.      Breath sounds: Normal breath sounds.   Abdominal:      General: Abdomen is flat. Bowel sounds are normal. There is no distension.      Palpations: There is no mass.      Tenderness: There is no guarding or rebound.   Musculoskeletal:      Cervical back: No tenderness.   Lymphadenopathy:      Cervical: No cervical adenopathy.   Skin:     General: Skin is warm.      Coloration: Skin is not jaundiced or pale.      Findings: No bruising or erythema.    Neurological:      General: No focal deficit present.      Mental Status: She is oriented to person, place, and time.   Psychiatric:         Mood and Affect: Mood normal.         Behavior: Behavior normal.       Performance Status:  Symptomatic; fully ambulatory    CT Chest 1/9/2024  IMPRESSION:  1. Scattered subcentimeter pulmonary nodules within the upper lung  zones as seen on prior imaging. There is a 4 mm pulmonary nodule at  the right lung apex demonstrating slight ground-glass appearance  appearing slightly more conspicuous.      2. Pleural-based pulmonary nodule at the left lung base laterally  stable from prior imaging measuring 5 x 10 mm. Surrounding  ground-glass airspace infiltrate and septal thickening as seen on  prior imaging with component of postradiation change      3. Remote left posterolateral 8th and 9th rib fracture deformity  component which may reflect postradiation change        Assessment/Plan     This is a very nice 38-year-old patient with metastatic non-small cell carcinoma, possibly a poorly differentiated squamous, that was diagnosed in August 2020 with a high PD-L1 expression of 90% treated with single agent immunotherapy with a complete response.  She completed her 2 years of treatment in October 2022 and now has been off treatment for 16 months without evidence of disease progression.  She is clinically doing well too.   CT scan done for today's visit is stable.    We reviewed lab work from 1/10/24, which is good, and we will obtain more at her next visit.   She will see us back in 2 months time.  Her current schedule of visits are visits with every 2 months and imaging every 4 months.       Cancer Staging   No matching staging information was found for the patient.    Oncology History    No history exists.                 Lilliam Flannery, APRN-CNS

## 2024-03-11 NOTE — PROGRESS NOTES
Subjective    Patient ID: Janette Panda is a 38 y.o. female.    Chief Complaint: No chief complaint on file.     Last Surgery: I&D of right calcaneus  Last Surgery Date: 7/19/2023    HPI  Patient is a 38 y.o. female who is s/p right calcaneus I&D on 7/19/2023. Patient is currently ambulating on the right foot.  She reported that she had complete healing of the heel wound with no drainage.  However over the last few days she has noted very little drainage coming from the wound, but it is currently dry with a pinhole.  Patient is currently on p.o. antibiotics.. States that she currently receives treatment at home. She is ambulating in regular shoe. Patient denies fever or chills, N/T, night sweats, chest pain, and shortness of breath. Denies calf pain and foot swelling.     ROS: All other systems have been reviewed and are negative except as previously noted in history of present illness.      IMP:  Problem List Items Addressed This Visit       Acute osteomyelitis of right calcaneus (CMS/[facility]) - Primary     Other Visit Diagnoses       Closed displaced fracture of right calcaneus with delayed healing, unspecified portion of calcaneus, subsequent encounter        Relevant Orders    XR calcaneus right 2 views (Completed)            Objective   General: Alert and oriented x 3, NAD, respirations easy and unlabored with no audible wheezes, skin warm and dry, speech and dress appropriate for noted age, affect euthymic.     Musculoskeletal: Right foot and ankle  incisions c/d/I, no drainage.  Patient has a pin size hole at the level of her previous incision.  No surrounding erythema.  No fluid could be expressed.  no swelling to ankle and foot  compartments soft  no calf tenderness  sensation intact to light touch  motor intact including TA/GS/EHL  palpable DP/PT pulses 2+     X-ray: Images of right calcaneus reviewed personally by me today and reveal stable appearance. There appears to be interval calcification within  defect in the calcaneus. No lucencies.    Assessment/Plan   Encounter Diagnoses:  Acute osteomyelitis of right calcaneus (CMS/HCC)    Closed displaced fracture of right calcaneus with delayed healing, unspecified portion of calcaneus, subsequent encounter    PLAN: Patient is s/p right calcaneus I&D on 7/19/2023. Patient overall is doing well at this time. Patient will continue to weight bear as tolerated. Imaging shows stable appearance of the calcaneus.  Patient has a pin size hole with no active drainage currently.  I recommend continue observation at this point.  Patient will continue to follow-up with infectious disease and continue P.o. antibiotics.  I recommend avoiding shoes that rub over this area.  Patient will follow up in 3 months for repeat xrays and to evaluate incision and patient condition. Patient is in agreement with this plan. Xrays of right calcaneus will be needed at next visit.   Orders Placed This Encounter    XR calcaneus right 2 views

## 2024-03-15 ASSESSMENT — ENCOUNTER SYMPTOMS
MUSCULOSKELETAL NEGATIVE: 1
ENDOCRINE NEGATIVE: 1
EYES NEGATIVE: 1
ALLERGIC/IMMUNOLOGIC NEGATIVE: 1
HEMATOLOGIC/LYMPHATIC NEGATIVE: 1
PSYCHIATRIC NEGATIVE: 1
CARDIOVASCULAR NEGATIVE: 1
CONSTITUTIONAL NEGATIVE: 1
NEUROLOGICAL NEGATIVE: 1
GASTROINTESTINAL NEGATIVE: 1
RESPIRATORY NEGATIVE: 1

## 2024-03-19 ENCOUNTER — APPOINTMENT (OUTPATIENT)
Dept: ORTHOPEDIC SURGERY | Facility: HOSPITAL | Age: 39
End: 2024-03-19
Payer: COMMERCIAL

## 2024-04-08 ENCOUNTER — TELEPHONE (OUTPATIENT)
Dept: HEMATOLOGY/ONCOLOGY | Facility: HOSPITAL | Age: 39
End: 2024-04-08
Payer: COMMERCIAL

## 2024-04-08 NOTE — TELEPHONE ENCOUNTER
Pt requesting a refill of oxycodone 10mg q6, #60.  Last prescription written 3/6/24.  Preferred pharmacy is [facility] on [facility].

## 2024-04-09 DIAGNOSIS — C34.90 ADENOCARCINOMA, LUNG, UNSPECIFIED LATERALITY (MULTI): ICD-10-CM

## 2024-04-09 RX ORDER — OXYCODONE HYDROCHLORIDE 10 MG/1
10 TABLET ORAL EVERY 6 HOURS PRN
Qty: 60 TABLET | Refills: 0 | Status: SHIPPED | OUTPATIENT
Start: 2024-04-09

## 2024-04-25 ENCOUNTER — OFFICE VISIT (OUTPATIENT)
Dept: INFECTIOUS DISEASES | Facility: CLINIC | Age: 39
End: 2024-04-25
Payer: COMMERCIAL

## 2024-04-25 VITALS
BODY MASS INDEX: 27.49 KG/M2 | HEIGHT: 65 IN | SYSTOLIC BLOOD PRESSURE: 106 MMHG | WEIGHT: 165 LBS | DIASTOLIC BLOOD PRESSURE: 71 MMHG | HEART RATE: 91 BPM | TEMPERATURE: 98.3 F

## 2024-04-25 DIAGNOSIS — M86.10: Primary | ICD-10-CM

## 2024-04-25 PROCEDURE — 99214 OFFICE O/P EST MOD 30 MIN: CPT | Performed by: INTERNAL MEDICINE

## 2024-04-25 ASSESSMENT — ENCOUNTER SYMPTOMS
PSYCHIATRIC NEGATIVE: 1
HEMATOLOGIC/LYMPHATIC NEGATIVE: 1
RESPIRATORY NEGATIVE: 1
GASTROINTESTINAL NEGATIVE: 1
CONSTITUTIONAL NEGATIVE: 1
CARDIOVASCULAR NEGATIVE: 1
ENDOCRINE NEGATIVE: 1
MUSCULOSKELETAL NEGATIVE: 1
ALLERGIC/IMMUNOLOGIC NEGATIVE: 1
EYES NEGATIVE: 1
NEUROLOGICAL NEGATIVE: 1

## 2024-04-25 ASSESSMENT — PAIN SCALES - GENERAL: PAINLEVEL: 0-NO PAIN

## 2024-04-25 NOTE — LETTER
04/26/24    Sylvie Allison MD  [id number]  [facility] [id number]      Dear Dr. Sylvie Allison MD,    I am writing to confirm that your patient, Janette Panda, received care in my office on 04/26/24. I have enclosed a summary of the care provided to Janette for your reference.    Please contact me with any questions you may have regarding the visit.    Sincerely,         José Miguel Omer MD MPH  [id number]  [address] [id number]    CC: No Recipients

## 2024-04-25 NOTE — PROGRESS NOTES
Infectious Diseases Clinic Follow-up:    Reason for Visit:   Chief Complaint   Patient presents with    Follow-up       History of Current Issue  Janette Panda is a 38 y.o. year old female  who I have been following for om of the calcaneous, see A/P for full details      PAST MEDICAL HISTORY:  Past Medical History:   Diagnosis Date    Personal history of other diseases of the respiratory system 01/19/2021    History of sinus problem       PAST SURGICAL HISTORY:  Past Surgical History:   Procedure Laterality Date    OTHER SURGICAL HISTORY  09/15/2020    Appendectomy laparoscopic       ALLERGIES:    Allergies   Allergen Reactions    Amitriptyline Itching and Other     nausea    Tetracyclines Nausea/vomiting     Vomiting     Adhesive Tape-Silicones Itching    Latex Unknown    Other Itching and Other     Tegaderm Absorbent Dressing -itching  [id number] Standard - blisters and rash    Tramadol Other     N/V       MEDICATIONS:      Current Outpatient Medications:     albuterol 90 mcg/actuation inhaler, Inhale 1 puff if needed., Disp: , Rfl:     calcium carbonate-vitamin D3 (Calcium 600 with Vitamin D3) 600 mg-10 mcg (400 unit) chewable tablet, Calcium 600/Vitamin D 600-10 MG-MCG Oral Tablet Chewable Quantity: 60  Refills: 0  Start: 24-Jul-2023, Disp: , Rfl:     ELDERBERRY FRUIT ORAL, Take 1 capsule by mouth once daily., Disp: , Rfl:     gabapentin (Neurontin) 300 mg capsule, Take 1 capsule (300 mg) by mouth 3 times a day as needed (nerve pain)., Disp: 270 capsule, Rfl: 3    montelukast (Singulair) 10 mg tablet, Take 1 tablet (10 mg) by mouth once daily., Disp: , Rfl:     oxyCODONE (Roxicodone) 10 mg immediate release tablet, Take 1 tablet (10 mg) by mouth every 6 hours if needed for severe pain (7 - 10). 1 TO 2 TIMES A DAY, AS NEEDED FOR SEVERE BREAKTHROUGH PAIN, Disp: 60 tablet, Rfl: 0    Symbicort 80-4.5 mcg/actuation inhaler, Symbicort 80-4.5 MCG/ACT Inhalation Aerosol Quantity: 10  Refills: 0  Start: 28-Dec-2022,  "Disp: , Rfl:     Tylenol Extra Strength 500 mg tablet, Take 1-2 tablets (500-1,000 mg) by mouth every 8 hours if needed for mild pain (1 - 3) or moderate pain (4 - 6)., Disp: , Rfl:     NON FORMULARY, Moringa supplement 2 cap(s) orally once a day, Disp: , Rfl:     NON FORMULARY, soursop 1500 mg capsule 2 cap(s) orally once a day, Disp: , Rfl:     NON FORMULARY, mullein capsules 2 cap(s) orally once a day, Disp: , Rfl:     NON FORMULARY, sea singleton capsules 2 cap(s) orally once a day, Disp: , Rfl:     phenylephrine (Sudafed PE) 10 mg tablet, Take 1 tablet (10 mg) by mouth every 4 hours if needed., Disp: , Rfl:     REVIEW OF SYSTEMS:  Review of Systems   Constitutional: Negative.    HENT: Negative.     Eyes: Negative.    Respiratory: Negative.     Cardiovascular: Negative.    Gastrointestinal: Negative.    Endocrine: Negative.    Genitourinary: Negative.    Musculoskeletal: Negative.    Skin: Negative.    Allergic/Immunologic: Negative.    Neurological: Negative.    Hematological: Negative.    Psychiatric/Behavioral: Negative.         PHYSICAL EXAMINATION:       Visit Vitals  /71   Pulse 91   Temp 36.8 °C (98.3 °F)   Ht 1.651 m (5' 5\")   Wt 74.8 kg (165 lb)   BMI 27.46 kg/m²   Smoking Status Former   BSA 1.85 m²        EXAM:   Physical Exam  Vitals reviewed.   Constitutional:       Appearance: Normal appearance.   HENT:      Head: Normocephalic and atraumatic.      Mouth/Throat:      Mouth: Mucous membranes are moist.      Pharynx: Oropharynx is clear.   Cardiovascular:      Rate and Rhythm: Normal rate and regular rhythm.   Pulmonary:      Effort: Pulmonary effort is normal.   Abdominal:      General: Abdomen is flat. Bowel sounds are normal.      Palpations: Abdomen is soft.   Musculoskeletal:         General: Normal range of motion.      Cervical back: Normal range of motion.   Skin:     General: Skin is warm.   Neurological:      General: No focal deficit present.      Mental Status: She is alert and oriented " to person, place, and time.   Psychiatric:         Mood and Affect: Mood normal.         Behavior: Behavior normal.         Thought Content: Thought content normal.         Judgment: Judgment normal.          DATA:    Microbiology:     -12/15/22: BCX with CoNS in 1/4 vials, determined to be likely contaminant  -12/15/22: calcaneal wound cx grew Serratia, Pseudomonas (both S=cipro, cefepime)  -3/16/23: MRSA nares negative  -3/16/23: calcaneal wound cx (3/3 sets) grew Serratia (S=Cipro, cefepime, levo; R=cefazolin)  -7/16/23: blood cx x2 sets â€“ NGTD  -7/17/23: blood cx x2 sets â€“ NGTD  -7/19/23: calcaneal wound (1/3 sets) gram stain showing gram positive cocci, pending cx       ASSESSMENT / RECOMMENDATIONS:  Patient is a 37-year-old female with PMHx lung malignancy w/mets to R ankle c/b recurrent osteomyelitis s/p right Achilles tendon repair and multiple I&D/saucerizations, now s/p R calc I&D and saucerization on 7/19. ID is consulted for long term antibiotic recommendations.     Antibiotics  Cefepime (8 week course, completed 2/23/23)  Cefepime (3/19/23 â€“ 4/6/23)  Levofloxacin (4/6/23 - completed 4/27/23)  Ceftriaxone (7/16)  Vancomycin (7/16 â€“ 7/17)  Zosyn (7/17)  Cefazolin (7/19 - )     Assessment  #Acute on Chronic Osteomyelitis s/p multiple surgical interventions and abx courses  #S/p R calcaneal I&D and saucerization 7/19  :: Followed outpatient by Dr. Omer  :: XR ankle foot (7/17) s/f extensive osteolysis which appears slightly progressed relative to prior calcaneal radiography and markedly progressed relative to priorfoot radiography, compatible with worsening acute on chronic osteomyelitis.  Pt initially had R foot pain from metastasis s/p palliative RT and right calcaneal surgery for malunion in 9/2022. This was complicated by serratia and pseudomonas osteomyelitis in 12/2022, which was treated with I&D, saucerization, and 2 months of cefepime. Pt then diagnosed with serratia osteomyelitis in  3/2023, treated with I&D, saucerization, and 6 weeks of antibiotics (initially IV cefepime through PICC, transitioned to PO levofloxacin after problems with PICC line). Now currently presenting with acute on chronic osteomyelitis, drainage from wound, s/p OR on 7/19 for I&D and R calcaneal saucerization.     Gram stain of wound (7/19) positive for gram positive cocci, pending culture results. Unclear what organisms may be causing patient's osteomyelitis/STI. Possible that infection is polymicrobial. Also cannot r/o persistent infection by Serratia and Pseudomonas given previous positive bone cultures and hx of persistent serratia in calcaneal culture after 8 weeks of IV antibiotics. For now, pending culture results to determine optimal abx regimen and continuing broad spectrum abx coverage. Given patient's refusal of PICC placement and possibility of leaving AMA, would start bactrim OP.        7/21/23  1) Continue vanc/zosyn pending cultures  2) Advise that patient stays until cultures have resulted  3) If there continues to be difficulty establishing access, patient leaves AMA, or pt discharged early, start bactrim DS BID for 4 weeks  4) Follow-up with Dr. Omer (pt's ID provider) in 4 weeks to determine whether abx should be continued. We will schedule appointment.     UPDATE 10/26/2023  Tolerating Abxs well but healing not yet complete  Will need at least another 6 weeks of ABXs       UPDATE 4/25/2025  Doing well on oral ABXs    PLAN:  Continue Bactrim DS.  RTC in 3-4 mo to reasess        Pt is in agreement with plan     I spent 30 minutes in the professional and overall care of this patient.           José Miguel Omer MD MPH

## 2024-04-26 RX ORDER — SULFAMETHOXAZOLE AND TRIMETHOPRIM 800; 160 MG/1; MG/1
1 TABLET ORAL 2 TIMES DAILY
Qty: 180 TABLET | Refills: 1 | Status: SHIPPED | OUTPATIENT
Start: 2024-04-26 | End: 2024-07-25

## 2024-05-01 ENCOUNTER — TELEPHONE (OUTPATIENT)
Dept: ORTHOPEDIC SURGERY | Facility: HOSPITAL | Age: 39
End: 2024-05-01
Payer: COMMERCIAL

## 2024-05-01 NOTE — TELEPHONE ENCOUNTER
Dr. Lee is requesting patient, Janette Panda to send a picture of her foot wound to check on how it looks.     Janette cancelled LOV, r/s for 05/21, okay to keep appointment there. Last saw ID 04/25/2024 and will be continuing Bactrim DS. Per ID, wound not healed completely yet, ID feels that it will need at least 6 more weeks.     Sent [employer] message to patient. Will wait for response.     Tyra Barrientos LPN

## 2024-05-03 DIAGNOSIS — C34.11 MALIGNANT NEOPLASM OF UPPER LOBE, RIGHT BRONCHUS OR LUNG (MULTI): Primary | ICD-10-CM

## 2024-05-03 NOTE — TELEPHONE ENCOUNTER
Called patient to ask her to send a picture of her incision to Dr. Lee via [employer], patient said she would send a picture but has yet to send one.     Will let Dr. Lee know.     Tyra Barrientos LPN

## 2024-05-06 ENCOUNTER — HOSPITAL ENCOUNTER (OUTPATIENT)
Dept: RADIOLOGY | Facility: HOSPITAL | Age: 39
Discharge: HOME | End: 2024-05-06
Payer: COMMERCIAL

## 2024-05-06 DIAGNOSIS — C34.90 ADENOCARCINOMA, LUNG, UNSPECIFIED LATERALITY (MULTI): ICD-10-CM

## 2024-05-06 DIAGNOSIS — C34.11 MALIGNANT NEOPLASM OF UPPER LOBE OF RIGHT LUNG (MULTI): ICD-10-CM

## 2024-05-07 ENCOUNTER — HOSPITAL ENCOUNTER (OUTPATIENT)
Dept: RADIOLOGY | Facility: HOSPITAL | Age: 39
Discharge: HOME | End: 2024-05-07
Payer: COMMERCIAL

## 2024-05-07 PROCEDURE — 71260 CT THORAX DX C+: CPT

## 2024-05-07 PROCEDURE — 71260 CT THORAX DX C+: CPT | Performed by: STUDENT IN AN ORGANIZED HEALTH CARE EDUCATION/TRAINING PROGRAM

## 2024-05-07 PROCEDURE — 2550000001 HC RX 255 CONTRASTS: Performed by: CLINICAL NURSE SPECIALIST

## 2024-05-07 RX ADMIN — IOHEXOL 70 ML: 350 INJECTION, SOLUTION INTRAVENOUS at 13:48

## 2024-05-08 ENCOUNTER — APPOINTMENT (OUTPATIENT)
Dept: HEMATOLOGY/ONCOLOGY | Facility: HOSPITAL | Age: 39
End: 2024-05-08
Payer: COMMERCIAL

## 2024-05-13 NOTE — TELEPHONE ENCOUNTER
Patient sent picture via [employer]. Forwarded to Dr. Lee, will follow-up in clinic next week.jodi Barrientos LPN

## 2024-05-17 ENCOUNTER — TELEPHONE (OUTPATIENT)
Dept: ADMISSION | Facility: HOSPITAL | Age: 39
End: 2024-05-17
Payer: COMMERCIAL

## 2024-05-17 NOTE — TELEPHONE ENCOUNTER
Patient is requesting oxycodone refill to be sent to the [facility] pharmacy on file. Next MD FUV is 05/20/24.

## 2024-05-20 ENCOUNTER — TELEPHONE (OUTPATIENT)
Dept: ADMISSION | Facility: HOSPITAL | Age: 39
End: 2024-05-20
Payer: COMMERCIAL

## 2024-05-20 DIAGNOSIS — C34.11 MALIGNANT NEOPLASM OF UPPER LOBE OF RIGHT LUNG (MULTI): Primary | ICD-10-CM

## 2024-05-20 NOTE — TELEPHONE ENCOUNTER
Pt left 2nd message requesting refill   Oxycodone 10mg. 1 tablet every 6 hrs prn  Pharmacy [facility] on [address].   Pt had FUV today.

## 2024-05-21 ENCOUNTER — HOSPITAL ENCOUNTER (OUTPATIENT)
Dept: RADIOLOGY | Facility: HOSPITAL | Age: 39
Discharge: HOME | End: 2024-05-21
Payer: COMMERCIAL

## 2024-05-21 ENCOUNTER — OFFICE VISIT (OUTPATIENT)
Dept: ORTHOPEDIC SURGERY | Facility: HOSPITAL | Age: 39
End: 2024-05-21
Payer: COMMERCIAL

## 2024-05-21 DIAGNOSIS — S92.001G CLOSED DISPLACED FRACTURE OF RIGHT CALCANEUS WITH DELAYED HEALING, UNSPECIFIED PORTION OF CALCANEUS, SUBSEQUENT ENCOUNTER: ICD-10-CM

## 2024-05-21 DIAGNOSIS — M86.171 ACUTE OSTEOMYELITIS OF RIGHT CALCANEUS (MULTI): Primary | ICD-10-CM

## 2024-05-21 PROCEDURE — 73650 X-RAY EXAM OF HEEL: CPT | Mod: RT

## 2024-05-21 PROCEDURE — 99214 OFFICE O/P EST MOD 30 MIN: CPT | Performed by: ORTHOPAEDIC SURGERY

## 2024-05-21 PROCEDURE — 73650 X-RAY EXAM OF HEEL: CPT | Mod: RIGHT SIDE | Performed by: STUDENT IN AN ORGANIZED HEALTH CARE EDUCATION/TRAINING PROGRAM

## 2024-05-21 NOTE — TELEPHONE ENCOUNTER
Spoke with pt- she states she came for FUV on 5/20 - waited over 45 minutes and had to leave for another appt.   Pt did reschedule appt for 6/17.   Supportive listening provided, pt states she felt it was inappropriate to make her wait and then have to reschedule.   Pt is out of medication- is willing to come to appt if sooner appt available.

## 2024-05-21 NOTE — PROGRESS NOTES
Subjective    Patient ID: Janette Panda is a 38 y.o. female.    Chief Complaint: No chief complaint on file.     Last Surgery: I&D of right calcaneus  Last Surgery Date: 7/19/2023    HPI  Patient is a 38 y.o. female who is s/p right calcaneus I&D on 7/19/2023. Patient is currently ambulating on the right foot.  She reported that she had complete healing of the heel wound with no drainage.  Patient is currently on p.o. antibiotics, bactrim.  She is ambulating in regular shoe. Patient denies fever or chills, N/T, night sweats, chest pain, and shortness of breath. Denies calf pain and foot swelling.     ROS: All other systems have been reviewed and are negative except as previously noted in history of present illness.      IMP:  Problem List Items Addressed This Visit       Acute osteomyelitis of right calcaneus (Multi) - Primary     Other Visit Diagnoses       Closed displaced fracture of right calcaneus with delayed healing, unspecified portion of calcaneus, subsequent encounter        Relevant Orders    XR calcaneus right 2 views (Completed)            Objective   General: Alert and oriented x 3, NAD, respirations easy and unlabored with no audible wheezes, skin warm and dry, speech and dress appropriate for noted age, affect euthymic.     Musculoskeletal: Right foot and ankle  incisions c/d/I, no drainage.  No surrounding erythema.  Well healed  no swelling to ankle and foot  compartments soft  no calf tenderness  sensation intact to light touch  motor intact including TA/GS/EHL  palpable DP/PT pulses 2+     X-ray: Images of right calcaneus reviewed personally by me today and reveal stable appearance. There appears to be interval calcification within defect in the calcaneus. No lucencies.    Assessment/Plan   Encounter Diagnoses:  Acute osteomyelitis of right calcaneus (Multi)    Closed displaced fracture of right calcaneus with delayed healing, unspecified portion of calcaneus, subsequent encounter    PLAN:  Patient is s/p right calcaneus I&D on 7/19/2023. Patient overall is doing well at this time. Patient will continue to weight bear as tolerated. Imaging shows stable appearance of the calcaneus.  The wound is completely healed without any drainage at this time.  She is able to ambulate independently.  Patient will continue to follow-up with infectious disease and continue P.o. antibiotics.  I recommend avoiding shoes that rub over this area.  Patient will follow up in 6 months for repeat xrays and to evaluate incision and patient condition. Patient is in agreement with this plan. Xrays of right calcaneus will be needed at next visit.   Orders Placed This Encounter    XR calcaneus right 2 views

## 2024-06-17 ENCOUNTER — LAB (OUTPATIENT)
Dept: LAB | Facility: HOSPITAL | Age: 39
End: 2024-06-17
Payer: COMMERCIAL

## 2024-06-17 ENCOUNTER — OFFICE VISIT (OUTPATIENT)
Dept: HEMATOLOGY/ONCOLOGY | Facility: HOSPITAL | Age: 39
End: 2024-06-17
Payer: COMMERCIAL

## 2024-06-17 VITALS
HEART RATE: 72 BPM | TEMPERATURE: 97.2 F | SYSTOLIC BLOOD PRESSURE: 110 MMHG | OXYGEN SATURATION: 100 % | WEIGHT: 158.73 LBS | RESPIRATION RATE: 18 BRPM | DIASTOLIC BLOOD PRESSURE: 69 MMHG | BODY MASS INDEX: 26.41 KG/M2

## 2024-06-17 DIAGNOSIS — C34.11 MALIGNANT NEOPLASM OF UPPER LOBE OF RIGHT LUNG (MULTI): Primary | ICD-10-CM

## 2024-06-17 DIAGNOSIS — C34.11 MALIGNANT NEOPLASM OF UPPER LOBE OF RIGHT LUNG (MULTI): ICD-10-CM

## 2024-06-17 DIAGNOSIS — C34.90 ADENOCARCINOMA, LUNG, UNSPECIFIED LATERALITY (MULTI): ICD-10-CM

## 2024-06-17 DIAGNOSIS — C34.11 MALIGNANT NEOPLASM OF UPPER LOBE, RIGHT BRONCHUS OR LUNG (MULTI): ICD-10-CM

## 2024-06-17 DIAGNOSIS — D89.89 OTHER SPECIFIED DISORDERS INVOLVING THE IMMUNE MECHANISM, NOT ELSEWHERE CLASSIFIED (MULTI): ICD-10-CM

## 2024-06-17 LAB
ALBUMIN SERPL BCP-MCNC: 4.7 G/DL (ref 3.4–5)
ALP SERPL-CCNC: 100 U/L (ref 33–110)
ALT SERPL W P-5'-P-CCNC: 8 U/L (ref 7–45)
ANION GAP SERPL CALC-SCNC: 12 MMOL/L (ref 10–20)
AST SERPL W P-5'-P-CCNC: 14 U/L (ref 9–39)
BASOPHILS # BLD AUTO: 0.04 X10*3/UL (ref 0–0.1)
BASOPHILS NFR BLD AUTO: 0.4 %
BILIRUB SERPL-MCNC: 0.5 MG/DL (ref 0–1.2)
BUN SERPL-MCNC: 4 MG/DL (ref 6–23)
CALCIUM SERPL-MCNC: 9.7 MG/DL (ref 8.6–10.3)
CHLORIDE SERPL-SCNC: 103 MMOL/L (ref 98–107)
CO2 SERPL-SCNC: 28 MMOL/L (ref 21–32)
CORTIS SERPL-MCNC: 10.9 UG/DL (ref 2.5–20)
CORTIS SERPL-MCNC: 9.4 UG/DL (ref 2.5–20)
CREAT SERPL-MCNC: 0.9 MG/DL (ref 0.5–1.05)
EGFRCR SERPLBLD CKD-EPI 2021: 84 ML/MIN/1.73M*2
EOSINOPHIL # BLD AUTO: 0.07 X10*3/UL (ref 0–0.7)
EOSINOPHIL NFR BLD AUTO: 0.7 %
ERYTHROCYTE [DISTWIDTH] IN BLOOD BY AUTOMATED COUNT: 14.3 % (ref 11.5–14.5)
GLUCOSE SERPL-MCNC: 64 MG/DL (ref 74–99)
HCT VFR BLD AUTO: 40.1 % (ref 36–46)
HGB BLD-MCNC: 13 G/DL (ref 12–16)
IMM GRANULOCYTES # BLD AUTO: 0.01 X10*3/UL (ref 0–0.7)
IMM GRANULOCYTES NFR BLD AUTO: 0.1 % (ref 0–0.9)
LYMPHOCYTES # BLD AUTO: 4 X10*3/UL (ref 1.2–4.8)
LYMPHOCYTES NFR BLD AUTO: 40.9 %
MCH RBC QN AUTO: 27.7 PG (ref 26–34)
MCHC RBC AUTO-ENTMCNC: 32.4 G/DL (ref 32–36)
MCV RBC AUTO: 86 FL (ref 80–100)
MONOCYTES # BLD AUTO: 0.6 X10*3/UL (ref 0.1–1)
MONOCYTES NFR BLD AUTO: 6.1 %
NEUTROPHILS # BLD AUTO: 5.06 X10*3/UL (ref 1.2–7.7)
NEUTROPHILS NFR BLD AUTO: 51.8 %
NRBC BLD-RTO: 0 /100 WBCS (ref 0–0)
PLATELET # BLD AUTO: 559 X10*3/UL (ref 150–450)
POTASSIUM SERPL-SCNC: 4.4 MMOL/L (ref 3.5–5.3)
PROT SERPL-MCNC: 8.2 G/DL (ref 6.4–8.2)
RBC # BLD AUTO: 4.69 X10*6/UL (ref 4–5.2)
SODIUM SERPL-SCNC: 139 MMOL/L (ref 136–145)
TSH SERPL-ACNC: 1.16 MIU/L (ref 0.44–3.98)
WBC # BLD AUTO: 9.8 X10*3/UL (ref 4.4–11.3)

## 2024-06-17 PROCEDURE — 84443 ASSAY THYROID STIM HORMONE: CPT

## 2024-06-17 PROCEDURE — 36415 COLL VENOUS BLD VENIPUNCTURE: CPT

## 2024-06-17 PROCEDURE — 82024 ASSAY OF ACTH: CPT

## 2024-06-17 PROCEDURE — 80400 ACTH STIMULATION PANEL: CPT

## 2024-06-17 PROCEDURE — 85025 COMPLETE CBC W/AUTO DIFF WBC: CPT

## 2024-06-17 PROCEDURE — 99214 OFFICE O/P EST MOD 30 MIN: CPT | Performed by: INTERNAL MEDICINE

## 2024-06-17 PROCEDURE — 80053 COMPREHEN METABOLIC PANEL: CPT

## 2024-06-17 PROCEDURE — 82533 TOTAL CORTISOL: CPT

## 2024-06-17 ASSESSMENT — PAIN SCALES - GENERAL: PAINLEVEL: 7

## 2024-06-17 NOTE — PATIENT INSTRUCTIONS
Please schedule follow up to see Alma Delia around the week of 8/19  Please get your labs before your next visit

## 2024-06-17 NOTE — PROGRESS NOTES
Patient ID: Janette Panda is a 38 y.o. female.    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 PROBABLE Non-small cell lung cancer (poorly differentiated squamous?) Date of diagnosis is August 13, 2020 from a core biopsy of the left posterolateral chest wall mass.  Pathology  reports extensively necrotic epithelioid proliferation in a fibrotic background.  Tumor cells were somewhat rhabdoid in appearance with hyperchromatic, eccentric, moderately to markedly atypical nuclei some with big nuclei and eosinophilic cytoplasm.   By immunohistochemistry tumors were positive for keratin AE1/AE3, CAM 5.2, CK7, calretinin and KATIE-3 and negative for CK20 WT 1, P 40, desmin, SOX-10, CD34, TTF-1, CDX 2, PAX 8, estrogen receptor.  I and I immunostain retained nuclear expression.  The  differential diagnosis was a malignant epithelioid neoplasm including poorly differentiated carcinoma and mesothelioma.        STAGING     Stage IV disease, T1b (1.7 cm nodule within the right upper lobe), NX, M1C         CURRENT SITES OF DISEASE     RUL, left chest wall pleural based, left pleura, right infraclavicular LN, bilateral adrenal glands, right foot metatarsal bone  MRI of brain negative for malignancy        MOLECULAR GENOMICS     Insufficient tissue fotr NGS     1- CancerType ID Molecular Cancer :     Tumor type indeterminate. Cannot be excluded Squamous Cell 35%, Pancreatobiliary 31%, Ovary <5%     Ruled out with 95% confidence: adrenal, brain, cervix and endometrial adenoca, GE adeno, GIST, Germcell     Salivary, colorectal, Kidney, Hepatocellular, lung adeno, lymphoma, melanoma, mesotheliona, neuroendocrin     (including MERKEL and small cell), sarcoma, thymus, thyroid, bladder     2- TEMPUS ctDNA: KRAS Q61H - 44.5% allelic frequency                             CDKN2A H83Y Missense varialt - LOF - allelic frequency 22%                             TP53 - R175H missense variant  LOF                             ARID1A LOF allelic frequency 24%                             MSI high not detected                             MEdian allelic frquency 23.4%     3- PD-L1 IHC TPS: 90%        SERUM TUMOR MARKER     Normal CEA and CA 19.9 in September 2020        PRIOR THERAPY     1- XRT to left chest wall.  2- Palliative XRT to right foot  3- Single agent Keytruda (refused chemotherapy) based on high PD-L1 expression starting Nov 2, 2020. Clinical improvement, radiographic stable disease/pseudoprogression initially; evidence of response on imaging of April 2021.  Completed 2 years of treatment  in October 2022        CURRENT THERAPY     1- Supportive Oncology for symptom management  2- Observation        CURRENT ONCOLOGICAL PROBLEMS     1-severe chest pain related to her left chest wall mass - resolved  2-severe constipation - improved  3-scan on 11/15/2021 showed ground glass opacities, thought by radiology to represent pneumonitis- patient is asymptomatic.  Will hold treatment 11/22/2021 and re-assess in 3 weeks  3-anorexia and losing weight - much improved  4-anxiety - improved  5- Right foot pain from metastasis - improved . Right calcaneal surgery for malunion on September 8, 2022 postoperative infection.  Antibiotic treatments in December 2022. She has required to further right calcaneus wound irrigation debridement, removal  of implants operation on December 15, 2022 as well as further surgery on March 17, 2023.  She is required IV antibiotics for osteomyelitis and followed by infectious diseases.  Bone cultures with multiple organisms.  Osteomyelitis.  On chronic suppressive antibiotic therapy June 2024        HISTORY OF PRESENT ILLNESS     This is a very nice 38-year-old patient.  She went to the [facility] emergency room on August 12, 2020 with severe back pain and a large lump that was painful.  When asking her when this started she states approximately 1 to 2 weeks prior to  this  although she had been losing weight for several months and was having some on and off pain there when questioning her more carefully.  She underwent a imaging studies with a CT scan of the chest abdomen and pelvis on August 13, 2020 which demonstrated  a 1.7 cm opacity within the right upper lobe felt to be most likely extrapulmonary arising from the pleura or chest wall.  The mass had a lobulated appearance with a small focus of macroscopic fat but no associated calcification.  Furthermore a 2.7 cm  rim-enhancing hypodense lesion with surrounding inflammatory changes was seen within the posterior and lateral aspect of the left 9th-10th rib.  Within the abdomen a 2.9 cm heterogeneous nodule was seen within the right adrenal gland.  She undergoes a  CT-guided biopsy and fine-needle aspiration on August 13, 2020 showing malignant cells along the posterolateral chest wall mass on the left side.  The pathology as described above.  She was seen initially by medical oncology on September 4, 2020 but referred  to my clinic for further evaluation on the day of this visit on September 16, 2020 given the unclear site of origin and pathology.        PAST MEDICAL HISTORY     1- Appendectomy  2- Kidney stones        SOCIAL HISTORY     Patient is 35 years old, she is single, she has no children, she lives with her mother and sister, she lives in [facility].  She started at the age of 14 smoking and currently still smokes at the age of 35.  Smokes on average 2 packs a day for the  past 20 years.  She works in manufacturing        CURRENT MEDS     See med list        ALLERGIES     Tetracycline        FAMILY HISTORY     Mother had stage III ovarian cancer at the age of 51.  Grandmother on the father's side had 2 cancers one in the breast and one in the lung.    Subjective    Cancer  Pertinent negatives include no abdominal pain, anorexia, arthralgias, change in bowel habit, chest pain, chills, congestion, coughing, diaphoresis,  fatigue, fever, headaches, joint swelling, myalgias, nausea, neck pain, numbness, rash, sore throat, swollen glands, urinary symptoms, vertigo, visual change, vomiting or weakness.       Patient states that she is overall doing well.  She last saw me in January 2024 but saw our advanced practice provider in the meantime.  Has had follow-up with infectious diseases and orthopedics for her chronic osteomyelitis of her right foot.  She is still on antibiotics and is on doses that she will continue for at least the next year she says, she is breathing well, no fever, urinating well, bowel movements regular, no shortness of breath cough or hemoptysis.    Objective    BSA: 1.82 meters squared  /69 (BP Location: Left arm, Patient Position: Sitting, BP Cuff Size: Adult)   Pulse 72   Temp 36.2 °C (97.2 °F) (Temporal)   Resp 18   Wt 72 kg (158 lb 11.7 oz)   SpO2 100%   BMI 26.41 kg/m²      Physical Exam  Constitutional:       General: She is not in acute distress.     Appearance: Normal appearance. She is not ill-appearing, toxic-appearing or diaphoretic.   HENT:      Nose: No congestion or rhinorrhea.      Mouth/Throat:      Pharynx: No oropharyngeal exudate or posterior oropharyngeal erythema.   Eyes:      General: No scleral icterus.     Conjunctiva/sclera: Conjunctivae normal.   Cardiovascular:      Rate and Rhythm: Normal rate and regular rhythm.      Pulses: Normal pulses.      Heart sounds: Normal heart sounds. No murmur heard.     No friction rub. No gallop.   Pulmonary:      Effort: Pulmonary effort is normal. No respiratory distress.      Breath sounds: Normal breath sounds. No stridor. No wheezing, rhonchi or rales.   Chest:      Chest wall: No tenderness.   Abdominal:      General: Abdomen is flat. Bowel sounds are normal. There is no distension.      Palpations: Abdomen is soft. There is no mass.      Tenderness: There is no abdominal tenderness. There is no guarding or rebound.   Musculoskeletal:       Cervical back: No tenderness.      Right lower leg: No edema.      Left lower leg: No edema.   Lymphadenopathy:      Cervical: No cervical adenopathy.   Skin:     General: Skin is warm.      Coloration: Skin is not jaundiced or pale.      Findings: No bruising, erythema, lesion or rash.   Neurological:      General: No focal deficit present.      Mental Status: She is oriented to person, place, and time.      Sensory: No sensory deficit.   Psychiatric:         Mood and Affect: Mood normal.         Behavior: Behavior normal.         Performance Status:  Symptomatic; fully ambulatory     Latest Reference Range & Units 06/17/24 13:43   GLUCOSE 74 - 99 mg/dL 64 (L)   SODIUM 136 - 145 mmol/L 139   POTASSIUM 3.5 - 5.3 mmol/L 4.4   CHLORIDE 98 - 107 mmol/L 103   Bicarbonate 21 - 32 mmol/L 28   Anion Gap 10 - 20 mmol/L 12   Blood Urea Nitrogen 6 - 23 mg/dL 4 (L)   Creatinine 0.50 - 1.05 mg/dL 0.90   EGFR >60 mL/min/1.73m*2 84   Calcium 8.6 - 10.3 mg/dL 9.7   Albumin 3.4 - 5.0 g/dL 4.7   Alkaline Phosphatase 33 - 110 U/L 100   ALT 7 - 45 U/L 8   AST 9 - 39 U/L 14   Bilirubin Total 0.0 - 1.2 mg/dL 0.5   Total Protein 6.4 - 8.2 g/dL 8.2   CORTISOL 2.5 - 20.0 ug/dL  2.5 - 20.0 ug/dL 10.9  9.4   Thyroid Stimulating Hormone 0.44 - 3.98 mIU/L 1.16   LEUKOCYTES (10*3/UL) IN BLOOD BY AUTOMATED COUNT, [place] 4.4 - 11.3 x10*3/uL 9.8   nRBC 0.0 - 0.0 /100 WBCs 0.0   ERYTHROCYTES (10*6/UL) IN BLOOD BY AUTOMATED COUNT, [place] 4.00 - 5.20 x10*6/uL 4.69   HEMOGLOBIN 12.0 - 16.0 g/dL 13.0   HEMATOCRIT 36.0 - 46.0 % 40.1   MCV 80 - 100 fL 86   MCH 26.0 - 34.0 pg 27.7   MCHC 32.0 - 36.0 g/dL 32.4   RED CELL DISTRIBUTION WIDTH 11.5 - 14.5 % 14.3   PLATELETS (10*3/UL) IN BLOOD AUTOMATED COUNT, [place] 150 - 450 x10*3/uL 559 (H)   NEUTROPHILS/100 LEUKOCYTES IN BLOOD BY AUTOMATED COUNT, [place] 40.0 - 80.0 % 51.8   Immature Granulocytes %, Automated 0.0 - 0.9 % 0.1   Lymphocytes % 13.0 - 44.0 % 40.9   Monocytes % 2.0 - 10.0 % 6.1    Eosinophils % 0.0 - 6.0 % 0.7   Basophils % 0.0 - 2.0 % 0.4   NEUTROPHILS (10*3/UL) IN BLOOD BY AUTOMATED COUNT, [place] 1.20 - 7.70 x10*3/uL 5.06   Immature Granulocytes Absolute, Automated 0.00 - 0.70 x10*3/uL 0.01   Lymphocytes Absolute 1.20 - 4.80 x10*3/uL 4.00   Monocytes Absolute 0.10 - 1.00 x10*3/uL 0.60   Eosinophils Absolute 0.00 - 0.70 x10*3/uL 0.07   Basophils Absolute 0.00 - 0.10 x10*3/uL 0.04   (L): Data is abnormally low  (H): Data is abnormally high      CT CHEST W IV CONTRAST;  5/7/2024   Impression   1. A 6 mm solid right upper lobe pulmonary nodule and additional 6 mm  pleural-based right upper lobe ground-glass nodule, which have been  slowly growing since prior examinations, most consistent with slow  growing neoplasms. Close attention on follow-up imaging is  recommended.  2. Similar mild upper lung predominant centrilobular and paraseptal  emphysema with findings suggestive of respiratory bronchiolitis.  3. Stable fracture deformities of left posterolateral 8th and 9th  ribs with similar mixed lytic/sclerotic appearance, likely  representing sequelae of prior radiation therapy.  4. Additional chronic stable findings as above.       Assessment/Plan     This is a very nice 38-year-old patient who was diagnosed with a aggressive malignant epithelioid neoplasm thought to be a non-small cell cancer (most likely a poorly differentiated squamous cell based on cancer type ID RNA sequencing molecular cancer ).  She was diagnosed in August 2020.  Her circulating tumor DNA demonstrated a K-issa Q61H alteration as well as T p53 and CDKN2A mutation.  There was no actionable changes but she had a high PD-L1 expression of 90% and was treated with single agent checkpoint inhibitor therapy from November 2020 to October 2022 with a complete response.  She is now over 1-1/2 years out since last being treated without any evidence of disease progression.  Based on this we will see her back in mid August  2024 with blood work.      Cancer Staging   No matching staging information was found for the patient.      Oncology History    No history exists.        Thaddeus Mayen MD  Professor of Medicine and Oncology  [facility]  Ramon Mcmullen Chair in Lung Cancer  [place] Endowed Director  [facility]  Thoracic Oncology  [facility]

## 2024-06-18 ENCOUNTER — APPOINTMENT (OUTPATIENT)
Dept: ORTHOPEDIC SURGERY | Facility: HOSPITAL | Age: 39
End: 2024-06-18
Payer: COMMERCIAL

## 2024-06-18 ENCOUNTER — TELEPHONE (OUTPATIENT)
Dept: ADMISSION | Facility: HOSPITAL | Age: 39
End: 2024-06-18
Payer: COMMERCIAL

## 2024-06-19 DIAGNOSIS — C34.90 ADENOCARCINOMA, LUNG, UNSPECIFIED LATERALITY (MULTI): ICD-10-CM

## 2024-06-19 LAB — ACTH PLAS-MCNC: 14 PG/ML (ref 7.2–63.3)

## 2024-06-19 RX ORDER — OXYCODONE HYDROCHLORIDE 10 MG/1
10 TABLET ORAL EVERY 6 HOURS PRN
Qty: 60 TABLET | Refills: 0 | Status: SHIPPED | OUTPATIENT
Start: 2024-06-19

## 2024-06-19 ASSESSMENT — ENCOUNTER SYMPTOMS
SORE THROAT: 0
ABDOMINAL PAIN: 0
MYALGIAS: 0
NECK PAIN: 0
JOINT SWELLING: 0
FEVER: 0
SWOLLEN GLANDS: 0
VOMITING: 0
FATIGUE: 0
VISUAL CHANGE: 0
ARTHRALGIAS: 0
NAUSEA: 0
COUGH: 0
NUMBNESS: 0
CHILLS: 0
VERTIGO: 0
DIAPHORESIS: 0
WEAKNESS: 0
ANOREXIA: 0
HEADACHES: 0
CHANGE IN BOWEL HABIT: 0

## 2024-06-20 DIAGNOSIS — C34.90 ADENOCARCINOMA, LUNG, UNSPECIFIED LATERALITY (MULTI): ICD-10-CM

## 2024-06-20 DIAGNOSIS — C34.11 MALIGNANT NEOPLASM OF UPPER LOBE OF RIGHT LUNG (MULTI): ICD-10-CM

## 2024-06-20 RX ORDER — GABAPENTIN 300 MG/1
300 CAPSULE ORAL 3 TIMES DAILY PRN
Qty: 270 CAPSULE | Refills: 3 | Status: SHIPPED | OUTPATIENT
Start: 2024-06-20

## 2024-06-25 ENCOUNTER — TELEPHONE (OUTPATIENT)
Dept: ORTHOPEDIC SURGERY | Facility: HOSPITAL | Age: 39
End: 2024-06-25
Payer: COMMERCIAL

## 2024-06-25 NOTE — TELEPHONE ENCOUNTER
Patient returned call  She c/o burning pain in her foot  Swelling throughout  Drainage periodically - clear fluid    Scheduled next week to see Dr. Lee.     Tyra Barrientos LPN

## 2024-06-25 NOTE — TELEPHONE ENCOUNTER
Patient was scheduled today for an appointment with Dr. Lee    Reason for visit was increased discharge and increased pain coming from foot.     Called and LM to get more information.     Tyra Barrientos LPN

## 2024-06-26 ENCOUNTER — PATIENT MESSAGE (OUTPATIENT)
Dept: ORTHOPEDIC SURGERY | Facility: HOSPITAL | Age: 39
End: 2024-06-26
Payer: COMMERCIAL

## 2024-06-27 NOTE — PATIENT COMMUNICATION
Picture forwarded to Dr. Lee to look at.     Patient is scheduled this upcoming week to see Dr. Lee at [facility].     Tyra Barrientos LPN

## 2024-07-02 ENCOUNTER — APPOINTMENT (OUTPATIENT)
Dept: ORTHOPEDIC SURGERY | Facility: HOSPITAL | Age: 39
End: 2024-07-02
Payer: COMMERCIAL

## 2024-07-02 ENCOUNTER — OFFICE VISIT (OUTPATIENT)
Dept: ORTHOPEDIC SURGERY | Facility: HOSPITAL | Age: 39
End: 2024-07-02
Payer: COMMERCIAL

## 2024-07-02 DIAGNOSIS — S92.001G CLOSED DISPLACED FRACTURE OF RIGHT CALCANEUS WITH DELAYED HEALING, UNSPECIFIED PORTION OF CALCANEUS, SUBSEQUENT ENCOUNTER: ICD-10-CM

## 2024-07-16 ENCOUNTER — HOSPITAL ENCOUNTER (OUTPATIENT)
Dept: RADIOLOGY | Facility: HOSPITAL | Age: 39
Discharge: HOME | End: 2024-07-16
Payer: MEDICARE

## 2024-07-16 ENCOUNTER — OFFICE VISIT (OUTPATIENT)
Dept: ORTHOPEDIC SURGERY | Facility: HOSPITAL | Age: 39
End: 2024-07-16
Payer: MEDICARE

## 2024-07-16 DIAGNOSIS — S92.001P: ICD-10-CM

## 2024-07-16 DIAGNOSIS — M86.171 ACUTE OSTEOMYELITIS OF RIGHT CALCANEUS (MULTI): Primary | ICD-10-CM

## 2024-07-16 PROCEDURE — 99214 OFFICE O/P EST MOD 30 MIN: CPT | Performed by: ORTHOPAEDIC SURGERY

## 2024-07-16 PROCEDURE — 1036F TOBACCO NON-USER: CPT | Performed by: ORTHOPAEDIC SURGERY

## 2024-07-16 PROCEDURE — 73650 X-RAY EXAM OF HEEL: CPT | Mod: RT

## 2024-07-16 ASSESSMENT — PATIENT HEALTH QUESTIONNAIRE - PHQ9
1. LITTLE INTEREST OR PLEASURE IN DOING THINGS: SEVERAL DAYS
SUM OF ALL RESPONSES TO PHQ9 QUESTIONS 1 AND 2: 2
10. IF YOU CHECKED OFF ANY PROBLEMS, HOW DIFFICULT HAVE THESE PROBLEMS MADE IT FOR YOU TO DO YOUR WORK, TAKE CARE OF THINGS AT HOME, OR GET ALONG WITH OTHER PEOPLE: SOMEWHAT DIFFICULT
2. FEELING DOWN, DEPRESSED OR HOPELESS: SEVERAL DAYS

## 2024-07-16 ASSESSMENT — PAIN SCALES - GENERAL: PAINLEVEL_OUTOF10: 7

## 2024-07-16 ASSESSMENT — PAIN DESCRIPTION - DESCRIPTORS: DESCRIPTORS: BURNING;ACHING

## 2024-07-16 ASSESSMENT — PAIN - FUNCTIONAL ASSESSMENT: PAIN_FUNCTIONAL_ASSESSMENT: 0-10

## 2024-07-16 NOTE — PROGRESS NOTES
Subjective    Patient ID: Janette Panda is a 38 y.o. female.    Chief Complaint: Follow-up of the Right Heel     Last Surgery: I&D of right calcaneus  Last Surgery Date: 7/19/2023    HPI  Patient is a 38 y.o. female who is s/p right calcaneus I&D on 7/19/2023. Patient is currently ambulating on the right foot.  Patient reported that last If she began experiencing purulent drainage from her heel.  She has been doing local wound dressings and keeping the area clean.  The drainage have somewhat slowed down.  Patient is currently on p.o. antibiotics, bactrim.  She is ambulating in regular shoe. Patient denies fever or chills, N/T, night sweats, chest pain, and shortness of breath. Denies calf pain and foot swelling.     ROS: All other systems have been reviewed and are negative except as previously noted in history of present illness.      IMP:  Problem List Items Addressed This Visit    None  Visit Diagnoses       Closed nondisplaced fracture of right calcaneus with malunion, unspecified portion of calcaneus, subsequent encounter        Relevant Orders    MR foot right w and wo IV contrast            Objective   General: Alert and oriented x 3, NAD, respirations easy and unlabored with no audible wheezes, skin warm and dry, speech and dress appropriate for noted age, affect euthymic.     Musculoskeletal: Right foot and ankle  Patient has opening of heel wound.  About a centimeter.  There is purulent drainage on the dressing.  No could be expressed.  There is no erythema.  No swelling to ankle and foot  compartments soft  no calf tenderness  sensation intact to light touch  motor intact including TA/GS/EHL  palpable DP/PT pulses 2+     X-ray: Images of right calcaneus reviewed personally by me today and reveal stable appearance. There appears to be interval calcification within defect in the calcaneus. No lucencies.    Assessment/Plan   Encounter Diagnoses:  Closed nondisplaced fracture of right calcaneus with  malunion, unspecified portion of calcaneus, subsequent encounter    PLAN: Patient is s/p right calcaneus I&D on 7/19/2023.  Patient has drainage concerning for persistent osteomyelitis of the calcaneus.  I had a long discussion with patient regarding option at this point.  I discussed with her that recommend getting an MRI with contrast to evaluate for the extent of the infection.  Based on the I discussed with her that we can perform repeat debridement of the calcaneus.  Also discussed with her that definitive treatment given persistent infection could be likely below-knee amputation.  I encouraged her to think about these options.  When the MRI is done she will come back and see me.  I encouraged her to come with a family member given that this is a big decision.  She understood and is in agreement with treatment plan.  In the interim she will continue local wound care and continue p.o. antibiotics.  Orders Placed This Encounter    MR foot right w and wo IV contrast

## 2024-07-30 ENCOUNTER — HOSPITAL ENCOUNTER (OUTPATIENT)
Dept: RADIOLOGY | Facility: CLINIC | Age: 39
Discharge: HOME | End: 2024-07-30
Payer: MEDICARE

## 2024-07-30 DIAGNOSIS — S92.001P: ICD-10-CM

## 2024-07-30 PROCEDURE — 2550000001 HC RX 255 CONTRASTS: Mod: SE | Performed by: ORTHOPAEDIC SURGERY

## 2024-07-30 PROCEDURE — 73720 MRI LWR EXTREMITY W/O&W/DYE: CPT | Mod: RT

## 2024-07-30 PROCEDURE — A9575 INJ GADOTERATE MEGLUMI 0.1ML: HCPCS | Mod: SE | Performed by: ORTHOPAEDIC SURGERY

## 2024-07-30 PROCEDURE — 73720 MRI LWR EXTREMITY W/O&W/DYE: CPT | Mod: RIGHT SIDE | Performed by: RADIOLOGY

## 2024-07-30 RX ORDER — GADOTERATE MEGLUMINE 376.9 MG/ML
15 INJECTION INTRAVENOUS
Status: COMPLETED | OUTPATIENT
Start: 2024-07-30 | End: 2024-07-30

## 2024-08-02 ENCOUNTER — TELEPHONE (OUTPATIENT)
Dept: ORTHOPEDIC SURGERY | Facility: HOSPITAL | Age: 39
End: 2024-08-02
Payer: MEDICARE

## 2024-08-02 NOTE — TELEPHONE ENCOUNTER
Tried to call patient, but got voicemail. Left a message stating that we would discuss MRI results in the office and to give the office a call to let us know when she would like to come in on Tuesday for her appointment. Will have Tyra Barrientos LPN call on Monday as well.

## 2024-08-02 NOTE — TELEPHONE ENCOUNTER
Patient completed her MRI on 7/30/24 and would like a call back with results and next steps, please.

## 2024-08-06 ENCOUNTER — OFFICE VISIT (OUTPATIENT)
Dept: ORTHOPEDIC SURGERY | Facility: HOSPITAL | Age: 39
End: 2024-08-06
Payer: MEDICARE

## 2024-08-06 DIAGNOSIS — T81.89XD DRAINING POSTOPERATIVE WOUND, SUBSEQUENT ENCOUNTER: ICD-10-CM

## 2024-08-06 DIAGNOSIS — M86.171 ACUTE OSTEOMYELITIS OF RIGHT CALCANEUS (MULTI): Primary | ICD-10-CM

## 2024-08-06 PROBLEM — T81.89XA DRAINING POSTOPERATIVE WOUND: Status: ACTIVE | Noted: 2024-08-06

## 2024-08-06 PROCEDURE — 99214 OFFICE O/P EST MOD 30 MIN: CPT | Performed by: ORTHOPAEDIC SURGERY

## 2024-08-07 NOTE — H&P (VIEW-ONLY)
Subjective    Patient ID: Janette Panda is a 39 y.o. female.    Chief Complaint: No chief complaint on file.     Last Surgery: I&D of right calcaneus  Last Surgery Date: 7/19/2023    HPI  Patient is a 39 y.o. female who is s/p right calcaneus I&D on 7/19/2023.  Patient have history of metastatic lung cancer.  Patient had questionable metastasis to the right calcaneus that was treated with radiation.  She subsequently sustained a tongue type calcaneus fracture that was initially managed closed.  She developed significant malunion and impending ulceration of her heel.  She underwent surgical debridement and resection of malunited calcaneus and talus reattachment.  This was complicated by infection and subsequent osteomyelitis.  She has underwent multiple debridements and antibiotic treatment.  She was on p.o. Bactrim.  I saw her recently where she developed repeat drainage from the wound and concern for persistent osteomyelitis.  We obtained an MRI to better evaluate.  She reported that she is still having drainage that is purulent from the wound.  She is able to ambulate and is performing local wound care. Patient denies fever or chills, N/T, night sweats, chest pain, and shortness of breath. Denies calf pain and foot swelling.     ROS: All other systems have been reviewed and are negative except as previously noted in history of present illness.      IMP:  Problem List Items Addressed This Visit       Acute osteomyelitis of right calcaneus (Multi) - Primary    Relevant Orders    Case Request Operating Room: Right partial Calcanectomy (Completed)    Request for Pre-Admission Testing Visit    Draining postoperative wound    Relevant Orders    Case Request Operating Room: Right partial Calcanectomy (Completed)    Request for Pre-Admission Testing Visit         Objective   General: Alert and oriented x 3, NAD, respirations easy and unlabored with no audible wheezes, skin warm and dry, speech and dress appropriate  for noted age, affect euthymic.     Musculoskeletal: Right foot and ankle  Patient has opening of heel wound.  About a centimeter.  There is purulent drainage on the dressing.  No could be expressed.  There is no erythema.  No swelling to ankle and foot  compartments soft  no calf tenderness  sensation intact to light touch  motor intact including TA/GS/EHL  palpable DP/PT pulses 2+     X-ray: I personally reviewed the right calcaneus MRI done reveals significant involvement of the calcaneal body with significant changes and the central region that is concerning for bony abscess.  Connects with the draining sinus tract.    Assessment/Plan   Encounter Diagnoses:  Acute osteomyelitis of right calcaneus (Multi)    Draining postoperative wound, subsequent encounter    PLAN: Patient is s/p right calcaneus I&D on 7/19/2023.  Patient has drainage and MRI findings consistent with I discussed with her at this point persistent osteomyelitis of the calcaneus.  I had a long discussion with patient regarding option at this point.  Patient does have a large area of what appears to to be consistent with postradiation necrosis of her calcaneus with superimposed infection.  I discussed with her that to eradicate her infection will likely need to completely resect the portion of her calcaneus is necrotic.  She understand that this will result in significant loss of calcaneus and likely loss of the attachment of the Achilles tendon.  We can eradicate her infection we can discuss reconstructive options.  We would need to place a very large antibiotic spacer to to help with local antibiotic delivery and eradication of the infection.  Understand that this will be two-stage approach for reconstruction.  The follow-up option discussed was below-knee amputation.  After discussing the benefits, risk and alternatives of both options patient elected to proceed with partial resection of her calcaneus.  She understand that she will need IV  antibiotics they will be tailored based on intraoperative culture.  I encouraged her to stop taking the Bactrim is not to increase the yield of culture.  Given her medical history she was referred to our preoperative clinic for evaluation and restart placement.  Patient would like to wait till 23rd to proceed with surgery due to social issues and help postop.   Orders Placed This Encounter    Request for Pre-Admission Testing Visit    Case Request Operating Room: Right partial Calcanectomy

## 2024-08-19 ENCOUNTER — TELEMEDICINE (OUTPATIENT)
Dept: HEMATOLOGY/ONCOLOGY | Facility: HOSPITAL | Age: 39
End: 2024-08-19
Payer: MEDICARE

## 2024-08-19 DIAGNOSIS — C34.11 MALIGNANT NEOPLASM OF UPPER LOBE OF RIGHT LUNG (MULTI): ICD-10-CM

## 2024-08-19 PROCEDURE — 99214 OFFICE O/P EST MOD 30 MIN: CPT | Performed by: CLINICAL NURSE SPECIALIST

## 2024-08-20 ASSESSMENT — ENCOUNTER SYMPTOMS
JOINT SWELLING: 0
ANOREXIA: 0
SWOLLEN GLANDS: 0
DIAPHORESIS: 0
NUMBNESS: 0
FATIGUE: 0
VERTIGO: 0
NECK PAIN: 0
CHANGE IN BOWEL HABIT: 0
VOMITING: 0
VISUAL CHANGE: 0
MYALGIAS: 0
ABDOMINAL PAIN: 0
WEAKNESS: 0
CHILLS: 0
COUGH: 0
HEADACHES: 0
ARTHRALGIAS: 0
FEVER: 0
SORE THROAT: 0
NAUSEA: 0

## 2024-08-20 NOTE — PROGRESS NOTES
Patient ID: Janette Panda is a 39 y.o. female.    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 PROBABLE Non-small cell lung cancer (poorly differentiated squamous?) Date of diagnosis is August 13, 2020 from a core biopsy of the left posterolateral chest wall mass.  Pathology  reports extensively necrotic epithelioid proliferation in a fibrotic background.  Tumor cells were somewhat rhabdoid in appearance with hyperchromatic, eccentric, moderately to markedly atypical nuclei some with big nuclei and eosinophilic cytoplasm.   By immunohistochemistry tumors were positive for keratin AE1/AE3, CAM 5.2, CK7, calretinin and KATIE-3 and negative for CK20 WT 1, P 40, desmin, SOX-10, CD34, TTF-1, CDX 2, PAX 8, estrogen receptor.  I and I immunostain retained nuclear expression.  The  differential diagnosis was a malignant epithelioid neoplasm including poorly differentiated carcinoma and mesothelioma.        STAGING     Stage IV disease, T1b (1.7 cm nodule within the right upper lobe), NX, M1C         CURRENT SITES OF DISEASE     RUL, left chest wall pleural based, left pleura, right infraclavicular LN, bilateral adrenal glands, right foot metatarsal bone  MRI of brain negative for malignancy        MOLECULAR GENOMICS     Insufficient tissue fotr NGS     1- CancerType ID Molecular Cancer :     Tumor type indeterminate. Cannot be excluded Squamous Cell 35%, Pancreatobiliary 31%, Ovary <5%     Ruled out with 95% confidence: adrenal, brain, cervix and endometrial adenoca, GE adeno, GIST, Germcell     Salivary, colorectal, Kidney, Hepatocellular, lung adeno, lymphoma, melanoma, mesotheliona, neuroendocrin     (including MERKEL and small cell), sarcoma, thymus, thyroid, bladder     2- TEMPUS ctDNA: KRAS Q61H - 44.5% allelic frequency                             CDKN2A H83Y Missense varialt - LOF - allelic frequency 22%                             TP53 - R175H missense variant  LOF                             ARID1A LOF allelic frequency 24%                             MSI high not detected                             MEdian allelic frquency 23.4%     3- PD-L1 IHC TPS: 90%        SERUM TUMOR MARKER     Normal CEA and CA 19.9 in September 2020        PRIOR THERAPY     1- XRT to left chest wall.  2- Palliative XRT to right foot  3- Single agent Keytruda (refused chemotherapy) based on high PD-L1 expression starting Nov 2, 2020. Clinical improvement, radiographic stable disease/pseudoprogression initially; evidence of response on imaging of April 2021.  Completed 2 years of treatment  in October 2022        CURRENT THERAPY     1- Supportive Oncology for symptom management  2- Observation        CURRENT ONCOLOGICAL PROBLEMS     1-severe chest pain related to her left chest wall mass - resolved  2-severe constipation - improved  3-scan on 11/15/2021 showed ground glass opacities, thought by radiology to represent pneumonitis- patient is asymptomatic.  Will hold treatment 11/22/2021 and re-assess in 3 weeks  3-anorexia and losing weight - much improved  4-anxiety - improved  5- Right foot pain from metastasis - improved . Right calcaneal surgery for malunion on September 8, 2022 postoperative infection.  Antibiotic treatments in December 2022. She has required to further right calcaneus wound irrigation debridement, removal  of implants operation on December 15, 2022 as well as further surgery on March 17, 2023.  She is required IV antibiotics for osteomyelitis and followed by infectious diseases.  Bone cultures with multiple organisms.  Osteomyelitis.  On chronic suppressive antibiotic therapy June 2024        HISTORY OF PRESENT ILLNESS     This is a very nice 38-year-old patient.  She went to the [facility] emergency room on August 12, 2020 with severe back pain and a large lump that was painful.  When asking her when this started she states approximately 1 to 2 weeks prior to  this  although she had been losing weight for several months and was having some on and off pain there when questioning her more carefully.  She underwent a imaging studies with a CT scan of the chest abdomen and pelvis on August 13, 2020 which demonstrated  a 1.7 cm opacity within the right upper lobe felt to be most likely extrapulmonary arising from the pleura or chest wall.  The mass had a lobulated appearance with a small focus of macroscopic fat but no associated calcification.  Furthermore a 2.7 cm  rim-enhancing hypodense lesion with surrounding inflammatory changes was seen within the posterior and lateral aspect of the left 9th-10th rib.  Within the abdomen a 2.9 cm heterogeneous nodule was seen within the right adrenal gland.  She undergoes a  CT-guided biopsy and fine-needle aspiration on August 13, 2020 showing malignant cells along the posterolateral chest wall mass on the left side.  The pathology as described above.  She was seen initially by medical oncology on September 4, 2020 but referred  to my clinic for further evaluation on the day of this visit on September 16, 2020 given the unclear site of origin and pathology.        PAST MEDICAL HISTORY     1- Appendectomy  2- Kidney stones        SOCIAL HISTORY     Patient is 35 years old, she is single, she has no children, she lives with her mother and sister, she lives in [place].  She started at the age of 14 smoking and currently still smokes at the age of 35.  Smokes on average 2 packs a day for the  past 20 years.  She works in manufacturing        CURRENT MEDS     See med list        ALLERGIES     Tetracycline        FAMILY HISTORY     Mother had stage III ovarian cancer at the age of 51.  Grandmother on the father's side had 2 cancers one in the breast and one in the lung.    Subjective    Today I am having a virtual visit with Janette in follow up after completing immunotherapy.  She has bad news to report- after months of treatment for  osteomyelitis, her infection has returned.  She is off antibiotics and has a planned surgery on her heel this week, anticipating a long recovery.  She is otherwise well, good energy and appetite, but does feel less well when the infection reappears.  Breathing is good.  The MRI of her heel did not show any active cancer, she was told.       Cancer  Pertinent negatives include no abdominal pain, anorexia, arthralgias, change in bowel habit, chest pain, chills, congestion, coughing, diaphoresis, fatigue, fever, headaches, joint swelling, myalgias, nausea, neck pain, numbness, rash, sore throat, swollen glands, urinary symptoms, vertigo, visual change, vomiting or weakness.       Objective    BSA: There is no height or weight on file to calculate BSA.  There were no vitals taken for this visit.     No physical exam done due to video nature of visit- patient looks well and is articulate and good historian.      Performance Status:  Symptomatic; fully ambulatory      CT CHEST W IV CONTRAST;  5/7/2024   Impression   1. A 6 mm solid right upper lobe pulmonary nodule and additional 6 mm  pleural-based right upper lobe ground-glass nodule, which have been  slowly growing since prior examinations, most consistent with slow  growing neoplasms. Close attention on follow-up imaging is  recommended.  2. Similar mild upper lung predominant centrilobular and paraseptal  emphysema with findings suggestive of respiratory bronchiolitis.  3. Stable fracture deformities of left posterolateral 8th and 9th  ribs with similar mixed lytic/sclerotic appearance, likely  representing sequelae of prior radiation therapy.  4. Additional chronic stable findings as above.       Assessment/Plan     This is a very nice 38-year-old patient who was diagnosed with a aggressive malignant epithelioid neoplasm thought to be a non-small cell cancer (most likely a poorly differentiated squamous cell based on cancer type ID RNA sequencing molecular cancer  ).  She was diagnosed in August 2020.  Her circulating tumor DNA demonstrated a K-issa Q61H alteration as well as T p53 and CDKN2A mutation.  There was no actionable changes but she had a high PD-L1 expression of 90% and was treated with single agent checkpoint inhibitor therapy from November 2020 to October 2022 with a complete response.  She is now almost 2 years (Oct., 2022) out from treatment without any evidence of disease progression.  Based on this we will see her back in mid October, 2024 with blood work and a CT scan of her chest.  She will call us when she is out of the hospital from upcoming surgery as she anticipates needing help with pain management.         Cancer Staging   No matching staging information was found for the patient.      Oncology History    No history exists.        Lilliam Flannery, APRN-CNS

## 2024-08-22 ENCOUNTER — ANESTHESIA EVENT (OUTPATIENT)
Dept: OPERATING ROOM | Facility: HOSPITAL | Age: 39
End: 2024-08-22
Payer: MEDICARE

## 2024-08-22 ENCOUNTER — APPOINTMENT (OUTPATIENT)
Dept: LAB | Facility: LAB | Age: 39
End: 2024-08-22
Payer: MEDICARE

## 2024-08-22 ENCOUNTER — PRE-ADMISSION TESTING (OUTPATIENT)
Dept: PREADMISSION TESTING | Facility: HOSPITAL | Age: 39
DRG: 857 | End: 2024-08-22
Payer: MEDICARE

## 2024-08-22 ENCOUNTER — TELEPHONE (OUTPATIENT)
Dept: ORTHOPEDIC SURGERY | Facility: CLINIC | Age: 39
End: 2024-08-22

## 2024-08-22 VITALS
WEIGHT: 156 LBS | HEART RATE: 85 BPM | RESPIRATION RATE: 16 BRPM | BODY MASS INDEX: 25.99 KG/M2 | HEIGHT: 65 IN | DIASTOLIC BLOOD PRESSURE: 69 MMHG | SYSTOLIC BLOOD PRESSURE: 101 MMHG | TEMPERATURE: 97.2 F

## 2024-08-22 DIAGNOSIS — T81.89XD DRAINING POSTOPERATIVE WOUND, SUBSEQUENT ENCOUNTER: ICD-10-CM

## 2024-08-22 DIAGNOSIS — M86.171 ACUTE OSTEOMYELITIS OF RIGHT CALCANEUS (MULTI): Primary | ICD-10-CM

## 2024-08-22 LAB
ABO GROUP (TYPE) IN BLOOD: NORMAL
ALBUMIN SERPL BCP-MCNC: 4.4 G/DL (ref 3.4–5)
ALP SERPL-CCNC: 100 U/L (ref 33–110)
ALT SERPL W P-5'-P-CCNC: 18 U/L (ref 7–45)
ANION GAP SERPL CALC-SCNC: 15 MMOL/L (ref 10–20)
ANTIBODY SCREEN: NORMAL
AST SERPL W P-5'-P-CCNC: 18 U/L (ref 9–39)
BASOPHILS # BLD AUTO: 0.03 X10*3/UL (ref 0–0.1)
BASOPHILS NFR BLD AUTO: 0.2 %
BILIRUB SERPL-MCNC: 0.5 MG/DL (ref 0–1.2)
BUN SERPL-MCNC: 8 MG/DL (ref 6–23)
CALCIUM SERPL-MCNC: 9.4 MG/DL (ref 8.6–10.6)
CHLORIDE SERPL-SCNC: 98 MMOL/L (ref 98–107)
CO2 SERPL-SCNC: 25 MMOL/L (ref 21–32)
CREAT SERPL-MCNC: 0.82 MG/DL (ref 0.5–1.05)
EGFRCR SERPLBLD CKD-EPI 2021: >90 ML/MIN/1.73M*2
EOSINOPHIL # BLD AUTO: 0.01 X10*3/UL (ref 0–0.7)
EOSINOPHIL NFR BLD AUTO: 0.1 %
ERYTHROCYTE [DISTWIDTH] IN BLOOD BY AUTOMATED COUNT: 14.6 % (ref 11.5–14.5)
GLUCOSE SERPL-MCNC: 91 MG/DL (ref 74–99)
HCT VFR BLD AUTO: 41.2 % (ref 36–46)
HGB BLD-MCNC: 12.9 G/DL (ref 12–16)
IMM GRANULOCYTES # BLD AUTO: 0.07 X10*3/UL (ref 0–0.7)
IMM GRANULOCYTES NFR BLD AUTO: 0.4 % (ref 0–0.9)
LYMPHOCYTES # BLD AUTO: 3.86 X10*3/UL (ref 1.2–4.8)
LYMPHOCYTES NFR BLD AUTO: 22.4 %
MCH RBC QN AUTO: 27.6 PG (ref 26–34)
MCHC RBC AUTO-ENTMCNC: 31.3 G/DL (ref 32–36)
MCV RBC AUTO: 88 FL (ref 80–100)
MONOCYTES # BLD AUTO: 1.26 X10*3/UL (ref 0.1–1)
MONOCYTES NFR BLD AUTO: 7.3 %
NEUTROPHILS # BLD AUTO: 12.03 X10*3/UL (ref 1.2–7.7)
NEUTROPHILS NFR BLD AUTO: 69.6 %
NRBC BLD-RTO: 0 /100 WBCS (ref 0–0)
PLATELET # BLD AUTO: 480 X10*3/UL (ref 150–450)
POTASSIUM SERPL-SCNC: 3.4 MMOL/L (ref 3.5–5.3)
PROT SERPL-MCNC: 7.9 G/DL (ref 6.4–8.2)
RBC # BLD AUTO: 4.67 X10*6/UL (ref 4–5.2)
RH FACTOR (ANTIGEN D): NORMAL
SODIUM SERPL-SCNC: 135 MMOL/L (ref 136–145)
WBC # BLD AUTO: 17.3 X10*3/UL (ref 4.4–11.3)

## 2024-08-22 PROCEDURE — 85025 COMPLETE CBC W/AUTO DIFF WBC: CPT

## 2024-08-22 PROCEDURE — 99204 OFFICE O/P NEW MOD 45 MIN: CPT | Performed by: NURSE PRACTITIONER

## 2024-08-22 PROCEDURE — 87081 CULTURE SCREEN ONLY: CPT

## 2024-08-22 PROCEDURE — 86900 BLOOD TYPING SEROLOGIC ABO: CPT

## 2024-08-22 PROCEDURE — 36415 COLL VENOUS BLD VENIPUNCTURE: CPT

## 2024-08-22 PROCEDURE — 84075 ASSAY ALKALINE PHOSPHATASE: CPT

## 2024-08-22 RX ORDER — CHLORHEXIDINE GLUCONATE 40 MG/ML
SOLUTION TOPICAL DAILY PRN
Qty: 473 ML | Refills: 0 | Status: SHIPPED | OUTPATIENT
Start: 2024-08-22 | End: 2024-08-28 | Stop reason: HOSPADM

## 2024-08-22 ASSESSMENT — DUKE ACTIVITY SCORE INDEX (DASI)
CAN YOU DO LIGHT WORK AROUND THE HOUSE LIKE DUSTING OR WASHING DISHES: YES
CAN YOU RUN A SHORT DISTANCE: YES
CAN YOU WALK A BLOCK OR TWO ON LEVEL GROUND: YES
CAN YOU DO HEAVY WORK AROUND THE HOUSE LIKE SCRUBBING FLOORS OR LIFTING AND MOVING HEAVY FURNITURE: YES
CAN YOU DO YARD WORK LIKE RAKING LEAVES, WEEDING OR PUSHING A MOWER: YES
CAN YOU HAVE SEXUAL RELATIONS: YES
CAN YOU DO MODERATE WORK AROUND THE HOUSE LIKE VACUUMING, SWEEPING FLOORS OR CARRYING GROCERIES: YES
DASI METS SCORE: 9.9
CAN YOU WALK INDOORS, SUCH AS AROUND YOUR HOUSE: YES
CAN YOU CLIMB A FLIGHT OF STAIRS OR WALK UP A HILL: YES
TOTAL_SCORE: 58.2
CAN YOU PARTICIPATE IN STRENOUS SPORTS LIKE SWIMMING, SINGLES TENNIS, FOOTBALL, BASKETBALL, OR SKIING: YES
CAN YOU PARTICIPATE IN MODERATE RECREATIONAL ACTIVITIES LIKE GOLF, BOWLING, DANCING, DOUBLES TENNIS OR THROWING A BASEBALL OR FOOTBALL: YES
CAN YOU TAKE CARE OF YOURSELF (EAT, DRESS, BATHE, OR USE TOILET): YES

## 2024-08-22 ASSESSMENT — ENCOUNTER SYMPTOMS
RESPIRATORY NEGATIVE: 1
NEUROLOGICAL NEGATIVE: 1
GASTROINTESTINAL NEGATIVE: 1
ARTHRALGIAS: 1
EYES NEGATIVE: 1
NECK NEGATIVE: 1
CARDIOVASCULAR NEGATIVE: 1
ENDOCRINE NEGATIVE: 1
CONSTITUTIONAL NEGATIVE: 1

## 2024-08-22 ASSESSMENT — LIFESTYLE VARIABLES: SMOKING_STATUS: NONSMOKER

## 2024-08-22 NOTE — TELEPHONE ENCOUNTER
Patient returned call.   Verified time for surgery tomorrow.     No further questions    Tyra Barrientos LPN

## 2024-08-22 NOTE — PREPROCEDURE INSTRUCTIONS
Fasting Guidelines    NPO Instructions:    Do not eat any food after midnight the night before your surgery/procedure.  You may have up to TEN ounces of clear liquids until TWO hours before your instructed arrival time to the hospital. This includes water, black tea/coffee, (no milk or cream), apple juice, and/or electrolyte drinks (Gatorade).  You may chew gum up to TWO hours before your surgery/procedure.    Additional Instructions:     We have sent a prescription for Hibiclens soap and Peridex mouth wash to your preferred pharmacy.  If you have not already, Please  your prescription and start using as directed before surgery.  Follow the instruction sheet provided to you at your CPM/PAT appointment.    Avoid herbal supplements, multivitamins and NSAIDS (non-steroidal anti-inflammatory drugs) such as Advil, Aleve, Ibuprofen, Naproxen, Excedrin, Meloxicam or Celebrex for at least 7 days prior to surgery. May take Tylenol as needed.    Avoid tobacco and alcohol products for 24 hours prior to surgery.    CONTACT SURGEON'S OFFICE IF YOU DEVELOP:  * Fever = 100.4 F   * New respiratory symptoms (e.g. cough, shortness of breath, respiratory distress, sore throat)  * Recent loss of taste or smell  *Flu like symptoms such as headache, fatigue or gastrointestinal symptoms  * You develop any open sores, shingles, burning or painful urination   AND/OR:  * You no longer wish to have the surgery.  * Any other personal circumstances change that may lead to the need to cancel or defer this surgery.  *You were admitted to any hospital within one week of your planned procedure.    Seven/Six Days before Surgery:  Review your medication instructions, stop indicated medications    Day of Surgery:  Review your medication instructions, take indicated medications  Wear comfortable loose fitting clothing  Do not use moisturizers, creams, lotions or perfume  All jewelry and valuables should be left at home      [facility]  [phone]           Patient Information: Pre-Operative Infection Prevention Measures     Why did I have my nose, under my arms, and groin swabbed?  The purpose of the swab is to identify Staphylococcus aureus inside your nose or on your skin.  The swab was sent to the laboratory for culture.  A positive swab/culture for Staphylococcus aureus is called colonization or carriage.      What is Staphylococcus aureus?  Staphylococcus aureus, also known as “staph”, is a germ found on the skin or in the nose of healthy people.  Sometimes Staphylococcus aureus can get into the body and cause an infection.  This can be minor (such as pimples, boils, or other skin problems).  It might also be serious (such as a blood infection, pneumonia, or a surgical site infection).    What is Staphylococcus aureus colonization or carriage?  Colonization or carriage means that a person has the germ but is not sick from it.  These bacteria can be spread on the hands or when breathing or sneezing.    How is Staphylococcus aureus spread?  It is most often spread by close contact with a person or item that carries it.    What happens if my culture is positive for Staphylococcus aureus?  Your doctor/medical team will use this information to guide any antibiotic treatment which may be necessary.  Regardless of the culture results, we will clean the inside of your nose with a betadine swab just before you have your surgery.      Will I get an infection if I have Staphylococcus aureus in my nose or on my skin?  Anyone can get an infection with Staphylococcus aureus.  However, the best way to reduce your risk of infection is to follow the instructions provided to you for the use of your CHG soap and dental rinse.        Patient Information: Oral/Dental Rinse    What is oral/dental rinse?   It is a mouthwash. It is a way of cleaning the mouth with a germ-killing solution before your surgery.  The solution contains  chlorhexidine, commonly known as CHG.   It is used inside the mouth to kill a bacteria known as Staphylococcus aureus.  Let your doctor know if you are allergic to Chlorhexidine.    Why do I need to use CHG oral/dental rinse?  The CHG oral/dental rinse helps to kill a bacteria in your mouth known as Staphylococcus aureus.     This reduces the risk of infection at the surgical site.      Using your CHG oral/dental rinse  STEPS:  Use your CHG oral/dental rinse after you brush your teeth the night before (at bedtime) and the morning of your surgery.  Follow all directions on your prescription label.    Use the cap on the container to measure 15ml   Swish (gargle if you can) the mouthwash in your mouth for at least 30 seconds, (do not swallow) and spit out  After you use your CHG rinse, do not rinse your mouth with water, drink or eat.  Please refer to the prescription label for the appropriate time to resume oral intake      What side effects might I have using the CHG oral/dental rinse?  CHG rinse will stick to plaque on the teeth.  Brush and floss just before use.  Teeth brushing will help avoid staining of plaque during use.      Patient Information: Home Preoperative Antibacterial Shower      What is a home preoperative antibacterial shower?  This shower is a way of cleaning the skin with a germ-killing solution before surgery.  The solution contains chlorhexidine, commonly known as CHG.  CHG is a skin cleanser with germ-killing ability.  Let your doctor know if you are allergic to chlorhexidine.    Why do I need to take a preoperative antibacterial shower?  Skin is not sterile.  It is best to try to make your skin as free of germs as possible before surgery.  Proper cleansing with a germ-killing soap before surgery can lower the number of germs on your skin.  This helps to reduce the risk of infection at the surgical site.  Following the instructions listed below will help you prepare your skin for surgery.       How do I use the solution?  Steps:  Begin using your CHG soap 5 days before your scheduled surgery on ________________________.    First, wash and rinse your hair using the CHG soap. Keep CHG soap away from ear canals and eyes.  Rinse completely, do not condition.  Hair extensions should be removed.  Wash your face with your normal soap and rinse.    Apply the CHG solution to a clean wet washcloth.  Turn the water off or move away from the water spray to avoid premature rinsing of the CHG soap as you are applying.   Firmly lather your entire body from the neck down.  Do not use on your face.  Pay special attention to the area(s) where your incision(s) will be located unless they are on your face.  Avoid scrubbing your skin too hard.  The important point is to have the CHG soap sit on your skin for 3 minutes.    When the 3 minutes are up, turn on the water and rinse the CHG solution off your body completely.   DO NOT wash with regular soap after you have used the CHG soap solution  Pat yourself dry with a clean, freshly-laundered towel.  DO NOT apply powders, deodorants, or lotions.  Dress in clean, freshly laundered nightclothes.    Be sure to sleep with clean, freshly laundered sheets.  Be aware that CHG will cause stains on fabrics; if you wash them with bleach after use.  Rinse your washcloth and other linens that have contact with CHG completely.  Use only non-chlorine detergents to launder the items used.   The morning of surgery is the fifth day.  Repeat the above steps and dress in clean comfortable clothing     Whom should I contact if I have any questions regarding the use of CHG soap?  Call the [facility], [facility] at [phone] if you have any questions.               Preoperative Brain Exercises    What are brain exercises?  A brain exercise is any activity that engages your thinking (cognitive) skills.    What types of activities are  considered brain exercises?  Jigsaw puzzles, crossword puzzles, word jumble, memory games, word search, and many more.  Many can be found free online or on your phone via a mobile michael.    Why should I do brain exercises before my surgery?  More recent research has shown brain exercise before surgery can lower the risk of postoperative delirium (confusion) which can be especially important for older adults.  Patients who did brain exercises for 5 to 10 hours the days before surgery, cut their risk of postoperative delirium in half up to 1 week after surgery.         [facility]    Preoperative Deep Breathing Exercises    Why it is important to do deep breathing exercises before my surgery?  Deep breathing exercises strengthen your breathing muscles.  This helps you to recover after your surgery and decreases the chance of breathing complications.      How are the deep breathing exercises done?  Sit straight with your back supported.  Breathe in deeply and slowly through your nose. Your lower rib cage should expand and your abdomen may move forward.  Hold that breath for 3 to 5 seconds.  Breathe out through pursed lips, slowly and completely.  Rest and repeat 10 times every hour while awake.  Rest longer if you become dizzy or lightheaded.         Patient and Family Education             Ways You Can Help Prevent Blood Clots             This handout explains some simple things you can do to help prevent blood clots.      Blood clots are blockages that can form in the body's veins. When a blood clot forms in your deep veins, it may be called a deep vein thrombosis, or DVT for short. Blood clots can happen in any part of the body where blood flows, but they are most common in the arms and legs. If a piece of a blood clot breaks free and travels to the lungs, it is called a pulmonary embolus (PE). A PE can be a very serious problem.         Being in the hospital or having surgery can raise your  chances of getting a blood clot because you may not be well enough to move around as much as you normally do.         Ways you can help prevent blood clots in the hospital         Wearing SCDs. SCDs stands for Sequential Compression Devices.   SCDs are special sleeves that wrap around your legs  They attach to a pump that fills them with air to gently squeeze your legs every few minutes.   This helps return the blood in your legs to your heart.   SCDs should only be taken off when walking or bathing.   SCDs may not be comfortable, but they can help save your life.               Wearing compression stockings - if your doctor orders them. These special snug fitting stockings gently squeeze your legs to help blood flow.       Walking. Walking helps move the blood in your legs.   If your doctor says it is ok, try walking the halls at least   5 times a day. Ask us to help you get up, so you don't fall.      Taking any blood thinning medicines your doctor orders.        Page 1 of 2     [facility]; 3/23   Ways you can help prevent blood clots at home       Wearing compression stockings - if your doctor orders them. ? Walking - to help move the blood in your legs.       Taking any blood thinning medicines your doctor orders.      Signs of a blood clot or PE      Tell your doctor or nurse know right away if you have of the problems listed below.    If you are at home, seek medical care right away. Call 911 for chest pain or problems breathing.               Signs of a blood clot (DVT) - such as pain,  swelling, redness or warmth in your arm or leg      Signs of a pulmonary embolism (PE) - such as chest     pain or feeling short of breath

## 2024-08-22 NOTE — CPM/PAT H&P
CPM/PAT Evaluation       Name: Janette Panda (Janette Panda)  /Age: 1985/39 y.o.     Visit Type:   In-Person       Chief Complaint: Acute osteomyelitis of right calcaneus (Multi)  Draining postoperative wound, subsequent encounter    HPI  Pt is a 39 year old female with a PMHx significant for metastatic lung cancer. Pt had metastasis to the right calcaneus that was treated with radiation, antibiotics, debridements. Pt developed osteomyelitis and is s/p right calcaneus I&D on 2023. 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 Lee on 24.  Past Medical History:   Diagnosis Date    Personal history of other diseases of the respiratory system 2021    History of sinus problem       Past Surgical History:   Procedure Laterality Date    OTHER SURGICAL HISTORY  09/15/2020    Appendectomy laparoscopic       Patient  has no history on file for sexual activity.    Family History   Problem Relation Name Age of Onset    Ovarian cancer Mother      Drug abuse Father         Allergies   Allergen Reactions    Amitriptyline Itching and Other     nausea    Tetracyclines Nausea/vomiting     Vomiting     Adhesive Tape-Silicones Itching    Latex Unknown    Other Itching and Other     Tegaderm Absorbent Dressing -itching  [id number] Standard - blisters and rash    Tramadol Other     N/V       Prior to Admission medications    Medication Sig Start Date End Date Taking? Authorizing Provider   albuterol 90 mcg/actuation inhaler Inhale 1 puff if needed.    Historical Provider, MD   calcium carbonate-vitamin D3 (Calcium 600 with Vitamin D3) 600 mg-10 mcg (400 unit) chewable tablet Calcium 600/Vitamin D 600-10 MG-MCG Oral Tablet Chewable  Quantity: 60   Refills: 0  Start: 2023   Historical Provider, MD   ELDERBERRY FRUIT ORAL  Take 1 capsule by mouth once daily.    Historical Provider, MD   gabapentin (Neurontin) 300 mg capsule Take 1 capsule (300 mg) by mouth 3 times a day as needed (nerve pain). 6/20/24   KIERSTEN Sheffield   montelukast (Singulair) 10 mg tablet Take 1 tablet (10 mg) by mouth once daily.    Historical Provider, MD   NON FORMULARY Moringa supplement  2 cap(s) orally once a day    Historical Provider, MD   NON FORMULARY soursop 1500 mg capsule  2 cap(s) orally once a day    Historical Provider, MD   NON FORMULARY mullein capsules  2 cap(s) orally once a day    Historical Provider, MD   NON FORMULARY sea singleton capsules  2 cap(s) orally once a day    Historical Provider, MD   oxyCODONE (Roxicodone) 10 mg immediate release tablet Take 1 tablet (10 mg) by mouth every 6 hours if needed for severe pain (7 - 10). 1 TO 2 TIMES A DAY, AS NEEDED FOR SEVERE BREAKTHROUGH PAIN 6/19/24   KIERSTEN Sheffield   phenylephrine (Sudafed PE) 10 mg tablet Take 1 tablet (10 mg) by mouth every 4 hours if needed. 1/28/21   Historical Provider, MD   Symbicort 80-4.5 mcg/actuation inhaler Symbicort 80-4.5 MCG/ACT Inhalation Aerosol  Quantity: 10   Refills: 0  Start: 28-Dec-2022 12/28/22   Historical Provider, MD   Tylenol Extra Strength 500 mg tablet Take 1-2 tablets (500-1,000 mg) by mouth every 8 hours if needed for mild pain (1 - 3) or moderate pain (4 - 6). 11/22/22   Historical Provider, MD ACOSTA ROS:   Constitutional:   neg    Neuro/Psych:   neg    Eyes:   neg    Ears:   neg    Nose:   neg    Mouth:   neg    Throat:   neg    Neck:   neg    Cardio:   neg    Respiratory:   neg    Endocrine:   neg    GI:   neg    :   neg    Musculoskeletal:    arthralgias  Hematologic:   neg    Skin:  neg        Physical Exam  Constitutional:       Appearance: Normal appearance.   HENT:      Head: Normocephalic and atraumatic.      Nose: Nose normal.      Mouth/Throat:      Mouth: Mucous membranes are moist.   Cardiovascular:      Rate and  Rhythm: Normal rate and regular rhythm.      Pulses: Normal pulses.      Heart sounds: Normal heart sounds.   Pulmonary:      Effort: Pulmonary effort is normal.      Breath sounds: Normal breath sounds.   Abdominal:      Palpations: Abdomen is soft.   Genitourinary:     General: Normal vulva.   Musculoskeletal:      Cervical back: Normal range of motion.      Comments: Right heel pain 7/10   Skin:     General: Skin is warm.   Neurological:      General: No focal deficit present.      Mental Status: She is alert and oriented to person, place, and time.   Psychiatric:         Mood and Affect: Mood normal.         Behavior: Behavior normal.          PAT AIRWAY:   Airway:     Mallampati::  II    TM distance::  >3 FB    Neck ROM::  Full  normal        There were no vitals taken for this visit.    DASI Risk Score    No data to display       Caprini DVT Assessment    No data to display       Modified Frailty Index    No data to display       CHADS2 Stroke Risk  Current as of yesterday        N/A 3 to 100%: High Risk   2 to < 3%: Medium Risk   0 to < 2%: Low Risk     Last Change: N/A          This score determines the patient's risk of having a stroke if the patient has atrial fibrillation.        This score is not applicable to this patient. Components are not calculated.          Revised Cardiac Risk Index    No data to display       Apfel Simplified Score    No data to display       Risk Analysis Index Results This Encounter    No data found in the last 1 encounters.         Assessment and Plan:     Anesthesia:  The patient denies problems with anesthesia in the past such as PONV, prolonged sedation, awareness, dental damage, aspiration, cardiac arrest, difficult intubation, or unexpected hospital admissions.     Neuro:   The patient has no neurological diagnoses or significant findings on chart review, clinical presentation, and evaluation. No grossly apparent neurological perioperative risk.    HEENT/Airway  No  diagnoses, significant findings on chart review, clinical presentation, or evaluation.    Cardiovascular  The patient is scheduled for non-cardiac surgery associated with elevated risk. The patient has no major cardiac contraindications to non- cardiac surgery.  RCRI  The patient meets 0-1 RCRI criteria and therefore has a less than 1% risk of major adverse cardiac complications.  METS  The patient's functional capacity capacity is greater than 4 METS.  EKG  The patient has no EKG or echocardiographic changes concerning for myocardial ischemia.   Heart Failure  The patient has no known history of heart failure.  Additionally, the patient reports no symptoms of heart failure and demonstrates no signs of heart failure.  Hypertension Evaluation  The patient has no known history of hypertension and has a normal blood pressure today.  Heart Rhythm Evaluation  The patient has no history of arrhythmias.  Heart Valve Evaluation  The patient has no known history of valvular heart disease. The patient has no symptoms or physical exam findings to suggest valvular heart disease.  Cardiology Evaluation  The patient is not followed by cardiology.    The patient has a 30-day risk for MACE of 0 predictors, 3.9% risk for cardiac death, nonfatal myocardial infarction, and nonfatal cardiac arrest.  JILLIAN score which indicates a 0.1% risk of intraoperative or 30-day postoperative MACE (major adverse cardiac event).    Pulmonary   The patient has findings on chart review, clinical presentation and evaluation significant for asthma.    The patient has a stop bang score of 1, which places patient at low risk for having CECILIA.    ARISCAT 16, low, 1.6% risk of in-hospital postoperative pulmonary complications  PRODIGY 0, low risk of respiratory depression episode. Patient given PI sheet for preoperative deep breathing exercises.    Hematology  The patient has diagnoses or significant findings on chart review or clinical presentation and  evaluation significant for metastatic lung cancer  Antiplatelet management   The patient is not currently receiving antiplatelet therapy.  Anticoagulation management  The patient is not currently receiving anticoagulation therapy.    Caprini score 8, high risk of perioperative VTE.     Patient instructed to ambulate as soon as possible postoperatively to decrease thromboembolic risk. Initiate mechanical DVT prophylaxis as soon as possible and initiate chemical prophylaxis when deemed safe from a bleeding standpoint post surgery.     Transfusion Evaluation  A type and screen was obtained given the likelihood for perioperative transfusion of blood or blood products.    Gastrointestinal  No diagnoses or significant findings on chart review or clinical presentation and evaluation.    Eat 10- 0,  self-perceived oropharyngeal dysphagia scale (0-40)     Genitourinary  No diagnoses or significant findings on chart review or clinical presentation and evaluation.    Renal  No renal diagnoses or significant findings on chart review or clinical presentation and evaluation.    Musculoskeletal  The patient has diagnoses or significant findings on chart review or clinical presentation and evaluation significant for osteomyelitis    Endocrine  Diabetes Evaluation  The patient has no history of diabetes mellitus.  Thyroid Disease Evaluation  The patient has no history of thyroid disease.    ID  No diagnoses or significant findings on chart review or clinical presentation and evaluation. MRSA screening obtained. Prescriptions and instructions given for Hibiclens and Peridex.    -Preoperative medication instructions were provided and reviewed with the patient.  Any additional testing or evaluation was explained to the patient.  NPO Instructions were discussed, and the patient's questions were answered prior to conclusion of this encounter. Patient verbalized understanding of preoperative instructions. After Visit Summary given.       Recent Results (from the past 168 hour(s))   Comprehensive Metabolic Panel    Collection Time: 08/22/24  9:11 AM   Result Value Ref Range    Glucose 91 74 - 99 mg/dL    Sodium 135 (L) 136 - 145 mmol/L    Potassium 3.4 (L) 3.5 - 5.3 mmol/L    Chloride 98 98 - 107 mmol/L    Bicarbonate 25 21 - 32 mmol/L    Anion Gap 15 10 - 20 mmol/L    Urea Nitrogen 8 6 - 23 mg/dL    Creatinine 0.82 0.50 - 1.05 mg/dL    eGFR >90 >60 mL/min/1.73m*2    Calcium 9.4 8.6 - 10.6 mg/dL    Albumin 4.4 3.4 - 5.0 g/dL    Alkaline Phosphatase 100 33 - 110 U/L    Total Protein 7.9 6.4 - 8.2 g/dL    AST 18 9 - 39 U/L    Bilirubin, Total 0.5 0.0 - 1.2 mg/dL    ALT 18 7 - 45 U/L   CBC and Auto Differential    Collection Time: 08/22/24  9:11 AM   Result Value Ref Range    WBC 17.3 (H) 4.4 - 11.3 x10*3/uL    nRBC 0.0 0.0 - 0.0 /100 WBCs    RBC 4.67 4.00 - 5.20 x10*6/uL    Hemoglobin 12.9 12.0 - 16.0 g/dL    Hematocrit 41.2 36.0 - 46.0 %    MCV 88 80 - 100 fL    MCH 27.6 26.0 - 34.0 pg    MCHC 31.3 (L) 32.0 - 36.0 g/dL    RDW 14.6 (H) 11.5 - 14.5 %    Platelets 480 (H) 150 - 450 x10*3/uL    Neutrophils % 69.6 40.0 - 80.0 %    Immature Granulocytes %, Automated 0.4 0.0 - 0.9 %    Lymphocytes % 22.4 13.0 - 44.0 %    Monocytes % 7.3 2.0 - 10.0 %    Eosinophils % 0.1 0.0 - 6.0 %    Basophils % 0.2 0.0 - 2.0 %    Neutrophils Absolute 12.03 (H) 1.20 - 7.70 x10*3/uL    Immature Granulocytes Absolute, Automated 0.07 0.00 - 0.70 x10*3/uL    Lymphocytes Absolute 3.86 1.20 - 4.80 x10*3/uL    Monocytes Absolute 1.26 (H) 0.10 - 1.00 x10*3/uL    Eosinophils Absolute 0.01 0.00 - 0.70 x10*3/uL    Basophils Absolute 0.03 0.00 - 0.10 x10*3/uL   Type And Screen    Collection Time: 08/22/24  9:11 AM   Result Value Ref Range    ABO TYPE A     Rh TYPE POS     ANTIBODY SCREEN NEG    POCT pregnancy, urine manually resulted    Collection Time: 08/23/24  7:23 AM   Result Value Ref Range    Preg Test, Ur Negative Negative

## 2024-08-22 NOTE — TELEPHONE ENCOUNTER
Called and LM on patient's number [phone] to return call regarding surgery tomorrow.     Tyra Barrientos LPN

## 2024-08-23 ENCOUNTER — APPOINTMENT (OUTPATIENT)
Dept: RADIOLOGY | Facility: HOSPITAL | Age: 39
DRG: 857 | End: 2024-08-23
Payer: MEDICARE

## 2024-08-23 ENCOUNTER — ANESTHESIA (OUTPATIENT)
Dept: OPERATING ROOM | Facility: HOSPITAL | Age: 39
End: 2024-08-23
Payer: MEDICARE

## 2024-08-23 ENCOUNTER — HOSPITAL ENCOUNTER (INPATIENT)
Facility: HOSPITAL | Age: 39
DRG: 857 | End: 2024-08-23
Attending: ORTHOPAEDIC SURGERY | Admitting: ORTHOPAEDIC SURGERY
Payer: MEDICARE

## 2024-08-23 DIAGNOSIS — T81.89XD DRAINING POSTOPERATIVE WOUND, SUBSEQUENT ENCOUNTER: ICD-10-CM

## 2024-08-23 DIAGNOSIS — M86.171 ACUTE OSTEOMYELITIS OF RIGHT CALCANEUS (MULTI): Primary | ICD-10-CM

## 2024-08-23 DIAGNOSIS — G89.18 POSTOPERATIVE PAIN: ICD-10-CM

## 2024-08-23 LAB
PREGNANCY TEST URINE, POC: NEGATIVE
STAPHYLOCOCCUS SPEC CULT: NORMAL

## 2024-08-23 PROCEDURE — 3600000008 HC OR TIME - EACH INCREMENTAL 1 MINUTE - PROCEDURE LEVEL THREE: Performed by: ORTHOPAEDIC SURGERY

## 2024-08-23 PROCEDURE — 0QBL0ZZ EXCISION OF RIGHT TARSAL, OPEN APPROACH: ICD-10-PCS | Performed by: ORTHOPAEDIC SURGERY

## 2024-08-23 PROCEDURE — 87185 SC STD ENZYME DETCJ PER NZM: CPT | Performed by: ORTHOPAEDIC SURGERY

## 2024-08-23 PROCEDURE — 0SHH08Z INSERTION OF SPACER INTO RIGHT TARSAL JOINT, OPEN APPROACH: ICD-10-PCS | Performed by: ORTHOPAEDIC SURGERY

## 2024-08-23 PROCEDURE — 99221 1ST HOSP IP/OBS SF/LOW 40: CPT | Performed by: INTERNAL MEDICINE

## 2024-08-23 PROCEDURE — 2500000004 HC RX 250 GENERAL PHARMACY W/ HCPCS (ALT 636 FOR OP/ED): Performed by: ORTHOPAEDIC SURGERY

## 2024-08-23 PROCEDURE — 3700000002 HC GENERAL ANESTHESIA TIME - EACH INCREMENTAL 1 MINUTE: Performed by: ORTHOPAEDIC SURGERY

## 2024-08-23 PROCEDURE — 2500000001 HC RX 250 WO HCPCS SELF ADMINISTERED DRUGS (ALT 637 FOR MEDICARE OP)

## 2024-08-23 PROCEDURE — 87040 BLOOD CULTURE FOR BACTERIA: CPT

## 2024-08-23 PROCEDURE — 2720000007 HC OR 272 NO HCPCS: Performed by: ORTHOPAEDIC SURGERY

## 2024-08-23 PROCEDURE — 2500000005 HC RX 250 GENERAL PHARMACY W/O HCPCS: Performed by: ORTHOPAEDIC SURGERY

## 2024-08-23 PROCEDURE — 3700000001 HC GENERAL ANESTHESIA TIME - INITIAL BASE CHARGE: Performed by: ORTHOPAEDIC SURGERY

## 2024-08-23 PROCEDURE — 28120 PART REMOVAL OF ANKLE/HEEL: CPT | Performed by: ORTHOPAEDIC SURGERY

## 2024-08-23 PROCEDURE — 11981 INSERTION DRUG DLVR IMPLANT: CPT

## 2024-08-23 PROCEDURE — 11981 INSERTION DRUG DLVR IMPLANT: CPT | Performed by: ORTHOPAEDIC SURGERY

## 2024-08-23 PROCEDURE — 2500000005 HC RX 250 GENERAL PHARMACY W/O HCPCS

## 2024-08-23 PROCEDURE — 3600000003 HC OR TIME - INITIAL BASE CHARGE - PROCEDURE LEVEL THREE: Performed by: ORTHOPAEDIC SURGERY

## 2024-08-23 PROCEDURE — 99231 SBSQ HOSP IP/OBS SF/LOW 25: CPT | Performed by: STUDENT IN AN ORGANIZED HEALTH CARE EDUCATION/TRAINING PROGRAM

## 2024-08-23 PROCEDURE — 36415 COLL VENOUS BLD VENIPUNCTURE: CPT

## 2024-08-23 PROCEDURE — A28120 PR PART REMV TALUS OR CALCANEUS: Performed by: ANESTHESIOLOGY

## 2024-08-23 PROCEDURE — 87070 CULTURE OTHR SPECIMN AEROBIC: CPT | Performed by: ORTHOPAEDIC SURGERY

## 2024-08-23 PROCEDURE — 87206 SMEAR FLUORESCENT/ACID STAI: CPT | Performed by: ORTHOPAEDIC SURGERY

## 2024-08-23 PROCEDURE — 13160 SEC CLSR SURG WND/DEHSN XTN: CPT | Performed by: ORTHOPAEDIC SURGERY

## 2024-08-23 PROCEDURE — 88304 TISSUE EXAM BY PATHOLOGIST: CPT | Mod: TC,SUR | Performed by: ORTHOPAEDIC SURGERY

## 2024-08-23 PROCEDURE — C1713 ANCHOR/SCREW BN/BN,TIS/BN: HCPCS | Performed by: ORTHOPAEDIC SURGERY

## 2024-08-23 PROCEDURE — 2500000004 HC RX 250 GENERAL PHARMACY W/ HCPCS (ALT 636 FOR OP/ED)

## 2024-08-23 PROCEDURE — 2780000003 HC OR 278 NO HCPCS: Performed by: ORTHOPAEDIC SURGERY

## 2024-08-23 PROCEDURE — 88304 TISSUE EXAM BY PATHOLOGIST: CPT | Performed by: PATHOLOGY

## 2024-08-23 PROCEDURE — 2500000004 HC RX 250 GENERAL PHARMACY W/ HCPCS (ALT 636 FOR OP/ED): Performed by: STUDENT IN AN ORGANIZED HEALTH CARE EDUCATION/TRAINING PROGRAM

## 2024-08-23 PROCEDURE — 7100000002 HC RECOVERY ROOM TIME - EACH INCREMENTAL 1 MINUTE: Performed by: ORTHOPAEDIC SURGERY

## 2024-08-23 PROCEDURE — 81025 URINE PREGNANCY TEST: CPT | Performed by: ANESTHESIOLOGY

## 2024-08-23 PROCEDURE — 87015 SPECIMEN INFECT AGNT CONCNTJ: CPT | Performed by: ORTHOPAEDIC SURGERY

## 2024-08-23 PROCEDURE — 1100000001 HC PRIVATE ROOM DAILY

## 2024-08-23 PROCEDURE — 7100000001 HC RECOVERY ROOM TIME - INITIAL BASE CHARGE: Performed by: ORTHOPAEDIC SURGERY

## 2024-08-23 DEVICE — TOBRA FULL DOSE ANTIBIOTIC BONE CEMENT, 10 PACK CATALOG NUMBER IS 6197-9-010
Type: IMPLANTABLE DEVICE | Site: HEEL | Status: FUNCTIONAL
Brand: SIMPLEX

## 2024-08-23 RX ORDER — PROCHLORPERAZINE EDISYLATE 5 MG/ML
10 INJECTION INTRAMUSCULAR; INTRAVENOUS EVERY 6 HOURS PRN
Status: DISCONTINUED | OUTPATIENT
Start: 2024-08-23 | End: 2024-08-28 | Stop reason: HOSPADM

## 2024-08-23 RX ORDER — PROCHLORPERAZINE MALEATE 10 MG
10 TABLET ORAL EVERY 6 HOURS PRN
Status: DISCONTINUED | OUTPATIENT
Start: 2024-08-23 | End: 2024-08-28 | Stop reason: HOSPADM

## 2024-08-23 RX ORDER — HYDROMORPHONE HYDROCHLORIDE 1 MG/ML
0.2 INJECTION, SOLUTION INTRAMUSCULAR; INTRAVENOUS; SUBCUTANEOUS EVERY 5 MIN PRN
Status: DISCONTINUED | OUTPATIENT
Start: 2024-08-23 | End: 2024-08-23 | Stop reason: HOSPADM

## 2024-08-23 RX ORDER — NALOXONE HYDROCHLORIDE 0.4 MG/ML
0.2 INJECTION, SOLUTION INTRAMUSCULAR; INTRAVENOUS; SUBCUTANEOUS EVERY 5 MIN PRN
Status: DISCONTINUED | OUTPATIENT
Start: 2024-08-23 | End: 2024-08-28 | Stop reason: HOSPADM

## 2024-08-23 RX ORDER — FENTANYL CITRATE 50 UG/ML
INJECTION, SOLUTION INTRAMUSCULAR; INTRAVENOUS AS NEEDED
Status: DISCONTINUED | OUTPATIENT
Start: 2024-08-23 | End: 2024-08-23

## 2024-08-23 RX ORDER — AMOXICILLIN 250 MG
2 CAPSULE ORAL 2 TIMES DAILY
Status: DISCONTINUED | OUTPATIENT
Start: 2024-08-23 | End: 2024-08-28 | Stop reason: HOSPADM

## 2024-08-23 RX ORDER — PNV NO.95/FERROUS FUM/FOLIC AC 28MG-0.8MG
1 TABLET ORAL 2 TIMES DAILY
Qty: 60 TABLET | Refills: 11 | Status: SHIPPED | OUTPATIENT
Start: 2024-08-23 | End: 2024-08-30 | Stop reason: SDUPTHER

## 2024-08-23 RX ORDER — LIDOCAINE HYDROCHLORIDE 10 MG/ML
0.1 INJECTION INFILTRATION; PERINEURAL ONCE
Status: DISCONTINUED | OUTPATIENT
Start: 2024-08-23 | End: 2024-08-23 | Stop reason: HOSPADM

## 2024-08-23 RX ORDER — HYDROMORPHONE HYDROCHLORIDE 1 MG/ML
0.5 INJECTION, SOLUTION INTRAMUSCULAR; INTRAVENOUS; SUBCUTANEOUS EVERY 5 MIN PRN
Status: DISCONTINUED | OUTPATIENT
Start: 2024-08-23 | End: 2024-08-23 | Stop reason: HOSPADM

## 2024-08-23 RX ORDER — CEFAZOLIN 1 G/1
INJECTION, POWDER, FOR SOLUTION INTRAVENOUS AS NEEDED
Status: DISCONTINUED | OUTPATIENT
Start: 2024-08-23 | End: 2024-08-23

## 2024-08-23 RX ORDER — HYDROMORPHONE HYDROCHLORIDE 1 MG/ML
0.5 INJECTION, SOLUTION INTRAMUSCULAR; INTRAVENOUS; SUBCUTANEOUS EVERY 4 HOURS PRN
Status: DISCONTINUED | OUTPATIENT
Start: 2024-08-23 | End: 2024-08-28 | Stop reason: HOSPADM

## 2024-08-23 RX ORDER — SODIUM CHLORIDE 0.9 G/100ML
IRRIGANT IRRIGATION AS NEEDED
Status: DISCONTINUED | OUTPATIENT
Start: 2024-08-23 | End: 2024-08-23 | Stop reason: HOSPADM

## 2024-08-23 RX ORDER — OXYCODONE HYDROCHLORIDE 5 MG/1
5 TABLET ORAL EVERY 6 HOURS PRN
Qty: 28 TABLET | Refills: 0 | Status: SHIPPED | OUTPATIENT
Start: 2024-08-23

## 2024-08-23 RX ORDER — DIPHENHYDRAMINE HYDROCHLORIDE 50 MG/ML
12.5 INJECTION INTRAMUSCULAR; INTRAVENOUS EVERY 6 HOURS PRN
Status: DISCONTINUED | OUTPATIENT
Start: 2024-08-23 | End: 2024-08-28 | Stop reason: HOSPADM

## 2024-08-23 RX ORDER — ACETAMINOPHEN 325 MG/1
975 TABLET ORAL ONCE
Status: DISCONTINUED | OUTPATIENT
Start: 2024-08-23 | End: 2024-08-23 | Stop reason: HOSPADM

## 2024-08-23 RX ORDER — ONDANSETRON 4 MG/1
4 TABLET, FILM COATED ORAL EVERY 8 HOURS PRN
Status: DISCONTINUED | OUTPATIENT
Start: 2024-08-23 | End: 2024-08-28 | Stop reason: HOSPADM

## 2024-08-23 RX ORDER — ONDANSETRON HYDROCHLORIDE 2 MG/ML
INJECTION, SOLUTION INTRAVENOUS AS NEEDED
Status: DISCONTINUED | OUTPATIENT
Start: 2024-08-23 | End: 2024-08-23

## 2024-08-23 RX ORDER — ROCURONIUM BROMIDE 10 MG/ML
INJECTION, SOLUTION INTRAVENOUS AS NEEDED
Status: DISCONTINUED | OUTPATIENT
Start: 2024-08-23 | End: 2024-08-23

## 2024-08-23 RX ORDER — ASPIRIN 81 MG/1
81 TABLET ORAL 2 TIMES DAILY
Qty: 84 TABLET | Refills: 0 | Status: SHIPPED | OUTPATIENT
Start: 2024-08-23 | End: 2024-10-04

## 2024-08-23 RX ORDER — DOCUSATE SODIUM 100 MG/1
100 CAPSULE, LIQUID FILLED ORAL 2 TIMES DAILY
Qty: 60 CAPSULE | Refills: 2 | Status: SHIPPED | OUTPATIENT
Start: 2024-08-23 | End: 2024-09-22

## 2024-08-23 RX ORDER — OXYCODONE HYDROCHLORIDE 5 MG/1
10 TABLET ORAL EVERY 4 HOURS PRN
Status: DISCONTINUED | OUTPATIENT
Start: 2024-08-23 | End: 2024-08-23 | Stop reason: HOSPADM

## 2024-08-23 RX ORDER — SODIUM CHLORIDE, SODIUM LACTATE, POTASSIUM CHLORIDE, CALCIUM CHLORIDE 600; 310; 30; 20 MG/100ML; MG/100ML; MG/100ML; MG/100ML
100 INJECTION, SOLUTION INTRAVENOUS CONTINUOUS
Status: DISCONTINUED | OUTPATIENT
Start: 2024-08-23 | End: 2024-08-23 | Stop reason: HOSPADM

## 2024-08-23 RX ORDER — CYCLOBENZAPRINE HCL 10 MG
10 TABLET ORAL 3 TIMES DAILY PRN
Status: DISCONTINUED | OUTPATIENT
Start: 2024-08-23 | End: 2024-08-28 | Stop reason: HOSPADM

## 2024-08-23 RX ORDER — FERROUS SULFATE, DRIED 160(50) MG
1 TABLET, EXTENDED RELEASE ORAL 2 TIMES DAILY
Status: DISCONTINUED | OUTPATIENT
Start: 2024-08-23 | End: 2024-08-28 | Stop reason: HOSPADM

## 2024-08-23 RX ORDER — PROCHLORPERAZINE 25 MG/1
25 SUPPOSITORY RECTAL EVERY 12 HOURS PRN
Status: DISCONTINUED | OUTPATIENT
Start: 2024-08-23 | End: 2024-08-28 | Stop reason: HOSPADM

## 2024-08-23 RX ORDER — PROPOFOL 10 MG/ML
INJECTION, EMULSION INTRAVENOUS AS NEEDED
Status: DISCONTINUED | OUTPATIENT
Start: 2024-08-23 | End: 2024-08-23

## 2024-08-23 RX ORDER — LIDOCAINE HYDROCHLORIDE 20 MG/ML
INJECTION, SOLUTION INFILTRATION; PERINEURAL AS NEEDED
Status: DISCONTINUED | OUTPATIENT
Start: 2024-08-23 | End: 2024-08-23

## 2024-08-23 RX ORDER — TRANEXAMIC ACID 100 MG/ML
INJECTION, SOLUTION INTRAVENOUS AS NEEDED
Status: DISCONTINUED | OUTPATIENT
Start: 2024-08-23 | End: 2024-08-23

## 2024-08-23 RX ORDER — ACETAMINOPHEN 325 MG/1
650 TABLET ORAL EVERY 6 HOURS SCHEDULED
Status: DISCONTINUED | OUTPATIENT
Start: 2024-08-23 | End: 2024-08-28 | Stop reason: HOSPADM

## 2024-08-23 RX ORDER — TOBRAMYCIN 1.2 G/30ML
INJECTION, POWDER, LYOPHILIZED, FOR SOLUTION INTRAVENOUS AS NEEDED
Status: DISCONTINUED | OUTPATIENT
Start: 2024-08-23 | End: 2024-08-23 | Stop reason: HOSPADM

## 2024-08-23 RX ORDER — OXYCODONE HYDROCHLORIDE 5 MG/1
10 TABLET ORAL EVERY 4 HOURS PRN
Status: DISCONTINUED | OUTPATIENT
Start: 2024-08-23 | End: 2024-08-28 | Stop reason: HOSPADM

## 2024-08-23 RX ORDER — ONDANSETRON HYDROCHLORIDE 2 MG/ML
4 INJECTION, SOLUTION INTRAVENOUS ONCE AS NEEDED
Status: DISCONTINUED | OUTPATIENT
Start: 2024-08-23 | End: 2024-08-23 | Stop reason: HOSPADM

## 2024-08-23 RX ORDER — VANCOMYCIN HYDROCHLORIDE 1 G/20ML
INJECTION, POWDER, LYOPHILIZED, FOR SOLUTION INTRAVENOUS AS NEEDED
Status: DISCONTINUED | OUTPATIENT
Start: 2024-08-23 | End: 2024-08-23 | Stop reason: HOSPADM

## 2024-08-23 RX ORDER — METOCLOPRAMIDE HYDROCHLORIDE 5 MG/ML
10 INJECTION INTRAMUSCULAR; INTRAVENOUS ONCE AS NEEDED
Status: DISCONTINUED | OUTPATIENT
Start: 2024-08-23 | End: 2024-08-23 | Stop reason: HOSPADM

## 2024-08-23 RX ORDER — ERGOCALCIFEROL 1.25 MG/1
1250 CAPSULE ORAL DAILY
Status: COMPLETED | OUTPATIENT
Start: 2024-08-23 | End: 2024-08-24

## 2024-08-23 RX ORDER — ROPIVACAINE HYDROCHLORIDE 5 MG/ML
INJECTION, SOLUTION EPIDURAL; INFILTRATION; PERINEURAL AS NEEDED
Status: DISCONTINUED | OUTPATIENT
Start: 2024-08-23 | End: 2024-08-23

## 2024-08-23 RX ORDER — VANCOMYCIN HYDROCHLORIDE 1 G/20ML
INJECTION, POWDER, LYOPHILIZED, FOR SOLUTION INTRAVENOUS DAILY PRN
Status: DISCONTINUED | OUTPATIENT
Start: 2024-08-23 | End: 2024-08-26

## 2024-08-23 RX ORDER — MAGNESIUM HYDROXIDE 2400 MG/10ML
10 SUSPENSION ORAL DAILY PRN
Status: DISCONTINUED | OUTPATIENT
Start: 2024-08-23 | End: 2024-08-28 | Stop reason: HOSPADM

## 2024-08-23 RX ORDER — ONDANSETRON HYDROCHLORIDE 2 MG/ML
4 INJECTION, SOLUTION INTRAVENOUS EVERY 8 HOURS PRN
Status: DISCONTINUED | OUTPATIENT
Start: 2024-08-23 | End: 2024-08-28 | Stop reason: HOSPADM

## 2024-08-23 RX ORDER — PHENYLEPHRINE HCL IN 0.9% NACL 0.4MG/10ML
SYRINGE (ML) INTRAVENOUS AS NEEDED
Status: DISCONTINUED | OUTPATIENT
Start: 2024-08-23 | End: 2024-08-23

## 2024-08-23 RX ORDER — LABETALOL HYDROCHLORIDE 5 MG/ML
5 INJECTION, SOLUTION INTRAVENOUS ONCE AS NEEDED
Status: DISCONTINUED | OUTPATIENT
Start: 2024-08-23 | End: 2024-08-23 | Stop reason: HOSPADM

## 2024-08-23 RX ORDER — OXYCODONE HYDROCHLORIDE 5 MG/1
5 TABLET ORAL EVERY 4 HOURS PRN
Status: DISCONTINUED | OUTPATIENT
Start: 2024-08-23 | End: 2024-08-23 | Stop reason: HOSPADM

## 2024-08-23 RX ORDER — MIDAZOLAM HYDROCHLORIDE 1 MG/ML
INJECTION INTRAMUSCULAR; INTRAVENOUS AS NEEDED
Status: DISCONTINUED | OUTPATIENT
Start: 2024-08-23 | End: 2024-08-23

## 2024-08-23 RX ORDER — SODIUM CHLORIDE, SODIUM LACTATE, POTASSIUM CHLORIDE, CALCIUM CHLORIDE 600; 310; 30; 20 MG/100ML; MG/100ML; MG/100ML; MG/100ML
100 INJECTION, SOLUTION INTRAVENOUS CONTINUOUS
Status: ACTIVE | OUTPATIENT
Start: 2024-08-23 | End: 2024-08-24

## 2024-08-23 RX ORDER — ASPIRIN 81 MG/1
81 TABLET ORAL 2 TIMES DAILY
Status: DISCONTINUED | OUTPATIENT
Start: 2024-08-23 | End: 2024-08-28 | Stop reason: HOSPADM

## 2024-08-23 RX ORDER — OXYCODONE HYDROCHLORIDE 5 MG/1
5 TABLET ORAL EVERY 4 HOURS PRN
Status: DISCONTINUED | OUTPATIENT
Start: 2024-08-23 | End: 2024-08-28 | Stop reason: HOSPADM

## 2024-08-23 RX ORDER — POLYETHYLENE GLYCOL 3350 17 G/17G
17 POWDER, FOR SOLUTION ORAL DAILY
Status: DISCONTINUED | OUTPATIENT
Start: 2024-08-23 | End: 2024-08-28 | Stop reason: HOSPADM

## 2024-08-23 RX ORDER — CEFTRIAXONE 2 G/1
INJECTION, POWDER, FOR SOLUTION INTRAMUSCULAR; INTRAVENOUS AS NEEDED
Status: DISCONTINUED | OUTPATIENT
Start: 2024-08-23 | End: 2024-08-23 | Stop reason: HOSPADM

## 2024-08-23 RX ORDER — BISACODYL 10 MG/1
10 SUPPOSITORY RECTAL DAILY PRN
Status: DISCONTINUED | OUTPATIENT
Start: 2024-08-23 | End: 2024-08-28 | Stop reason: HOSPADM

## 2024-08-23 RX ADMIN — CEFAZOLIN 2 G: 1 INJECTION, POWDER, FOR SOLUTION INTRAMUSCULAR; INTRAVENOUS at 07:55

## 2024-08-23 RX ADMIN — LIDOCAINE HYDROCHLORIDE 80 MG: 20 INJECTION, SOLUTION INFILTRATION; PERINEURAL at 07:54

## 2024-08-23 RX ADMIN — TRANEXAMIC ACID 1000 MG: 100 INJECTION INTRAVENOUS at 08:02

## 2024-08-23 RX ADMIN — Medication 1 TABLET: at 16:25

## 2024-08-23 RX ADMIN — MIDAZOLAM HYDROCHLORIDE 2 MG: 1 INJECTION, SOLUTION INTRAMUSCULAR; INTRAVENOUS at 07:43

## 2024-08-23 RX ADMIN — PROPOFOL 110 MG: 10 INJECTION, EMULSION INTRAVENOUS at 07:54

## 2024-08-23 RX ADMIN — Medication 80 MCG: at 07:54

## 2024-08-23 RX ADMIN — ONDANSETRON 4 MG: 2 INJECTION, SOLUTION INTRAMUSCULAR; INTRAVENOUS at 09:16

## 2024-08-23 RX ADMIN — ROCURONIUM 50 MG: 50 INJECTION, SOLUTION INTRAVENOUS at 07:54

## 2024-08-23 RX ADMIN — OXYCODONE HYDROCHLORIDE 10 MG: 5 TABLET ORAL at 21:03

## 2024-08-23 RX ADMIN — PIPERACILLIN SODIUM AND TAZOBACTAM SODIUM 3.38 G: 3; .375 INJECTION, SOLUTION INTRAVENOUS at 21:04

## 2024-08-23 RX ADMIN — SODIUM CHLORIDE, SODIUM LACTATE, POTASSIUM CHLORIDE, AND CALCIUM CHLORIDE: 600; 310; 30; 20 INJECTION, SOLUTION INTRAVENOUS at 07:50

## 2024-08-23 RX ADMIN — DEXAMETHASONE SODIUM PHOSPHATE 4 MG: 4 INJECTION INTRA-ARTICULAR; INTRALESIONAL; INTRAMUSCULAR; INTRAVENOUS; SOFT TISSUE at 08:03

## 2024-08-23 RX ADMIN — ACETAMINOPHEN 650 MG: 325 TABLET ORAL at 17:49

## 2024-08-23 RX ADMIN — FENTANYL CITRATE 50 MCG: 50 INJECTION, SOLUTION INTRAMUSCULAR; INTRAVENOUS at 07:54

## 2024-08-23 RX ADMIN — SUGAMMADEX 200 MG: 100 INJECTION, SOLUTION INTRAVENOUS at 09:57

## 2024-08-23 RX ADMIN — SODIUM CHLORIDE, POTASSIUM CHLORIDE, SODIUM LACTATE AND CALCIUM CHLORIDE 100 ML/HR: 600; 310; 30; 20 INJECTION, SOLUTION INTRAVENOUS at 14:15

## 2024-08-23 RX ADMIN — ERGOCALCIFEROL 1250 MCG: 1.25 CAPSULE ORAL at 16:25

## 2024-08-23 RX ADMIN — ASPIRIN 81 MG: 81 TABLET, COATED ORAL at 16:25

## 2024-08-23 RX ADMIN — ASPIRIN 81 MG: 81 TABLET, COATED ORAL at 21:03

## 2024-08-23 RX ADMIN — Medication 1 TABLET: at 21:02

## 2024-08-23 RX ADMIN — ROPIVACAINE HYDROCHLORIDE 15 ML: 5 INJECTION, SOLUTION EPIDURAL; INFILTRATION; PERINEURAL at 07:08

## 2024-08-23 RX ADMIN — PIPERACILLIN SODIUM AND TAZOBACTAM SODIUM 3.38 G: 3; .375 INJECTION, SOLUTION INTRAVENOUS at 16:25

## 2024-08-23 RX ADMIN — FENTANYL CITRATE 50 MCG: 50 INJECTION, SOLUTION INTRAMUSCULAR; INTRAVENOUS at 08:47

## 2024-08-23 RX ADMIN — ROPIVACAINE HYDROCHLORIDE 10 ML: 5 INJECTION, SOLUTION EPIDURAL; INFILTRATION; PERINEURAL at 07:13

## 2024-08-23 SDOH — ECONOMIC STABILITY: FOOD INSECURITY

## 2024-08-23 SDOH — ECONOMIC STABILITY: HOUSING INSECURITY: IN THE LAST 12 MONTHS, HOW MANY PLACES HAVE YOU LIVED?: 1

## 2024-08-23 SDOH — ECONOMIC STABILITY: TRANSPORTATION INSECURITY

## 2024-08-23 SDOH — HEALTH STABILITY: MENTAL HEALTH: CURRENT SMOKER: 0

## 2024-08-23 SDOH — SOCIAL STABILITY: SOCIAL INSECURITY: HAS ANYONE EVER THREATENED TO HURT YOUR FAMILY OR YOUR PETS?: NO

## 2024-08-23 SDOH — SOCIAL STABILITY: SOCIAL INSECURITY: HAVE YOU HAD THOUGHTS OF HARMING ANYONE ELSE?: NO

## 2024-08-23 SDOH — ECONOMIC STABILITY: INCOME INSECURITY: IN THE LAST 12 MONTHS, WAS THERE A TIME WHEN YOU WERE NOT ABLE TO PAY THE MORTGAGE OR RENT ON TIME?: YES

## 2024-08-23 SDOH — SOCIAL STABILITY: SOCIAL INSECURITY: ARE THERE ANY APPARENT SIGNS OF INJURIES/BEHAVIORS THAT COULD BE RELATED TO ABUSE/NEGLECT?: NO

## 2024-08-23 SDOH — SOCIAL STABILITY: SOCIAL INSECURITY: WERE YOU ABLE TO COMPLETE ALL THE BEHAVIORAL HEALTH SCREENINGS?: YES

## 2024-08-23 SDOH — ECONOMIC STABILITY: HOUSING INSECURITY: IN THE LAST 12 MONTHS, WAS THERE A TIME WHEN YOU WERE NOT ABLE TO PAY THE MORTGAGE OR RENT ON TIME?: YES

## 2024-08-23 SDOH — SOCIAL STABILITY: SOCIAL INSECURITY: ABUSE: ADULT

## 2024-08-23 SDOH — ECONOMIC STABILITY: FOOD INSECURITY
WITHIN THE PAST 12 MONTHS, YOU WORRIED THAT YOUR FOOD WOULD RUN OUT BEFORE YOU GOT THE MONEY TO BUY MORE.: PATIENT DECLINED

## 2024-08-23 SDOH — ECONOMIC STABILITY: HOUSING INSECURITY
IN THE LAST 12 MONTHS, WAS THERE A TIME WHEN YOU DID NOT HAVE A STEADY PLACE TO SLEEP OR SLEPT IN A SHELTER (INCLUDING NOW)?: NO

## 2024-08-23 SDOH — SOCIAL STABILITY: SOCIAL INSECURITY: DO YOU FEEL ANYONE HAS EXPLOITED OR TAKEN ADVANTAGE OF YOU FINANCIALLY OR OF YOUR PERSONAL PROPERTY?: NO

## 2024-08-23 SDOH — SOCIAL STABILITY: SOCIAL INSECURITY: DO YOU FEEL UNSAFE GOING BACK TO THE PLACE WHERE YOU ARE LIVING?: NO

## 2024-08-23 SDOH — SOCIAL STABILITY: SOCIAL INSECURITY: HAVE YOU HAD ANY THOUGHTS OF HARMING ANYONE ELSE?: NO

## 2024-08-23 SDOH — ECONOMIC STABILITY: HOUSING INSECURITY: IN THE PAST 12 MONTHS HAS THE ELECTRIC, GAS, OIL, OR WATER COMPANY THREATENED TO SHUT OFF SERVICES IN YOUR HOME?: YES

## 2024-08-23 SDOH — ECONOMIC STABILITY: TRANSPORTATION INSECURITY
IN THE PAST 12 MONTHS, HAS LACK OF TRANSPORTATION KEPT YOU FROM MEETINGS, WORK, OR FROM GETTING THINGS NEEDED FOR DAILY LIVING?: NO

## 2024-08-23 SDOH — ECONOMIC STABILITY: FOOD INSECURITY: WITHIN THE PAST 12 MONTHS, THE FOOD YOU BOUGHT JUST DIDN'T LAST AND YOU DIDN'T HAVE MONEY TO GET MORE.: PATIENT DECLINED

## 2024-08-23 SDOH — ECONOMIC STABILITY: HOUSING INSECURITY

## 2024-08-23 SDOH — ECONOMIC STABILITY: FOOD INSECURITY: WITHIN THE PAST 12 MONTHS, YOU WORRIED THAT YOUR FOOD WOULD RUN OUT BEFORE YOU GOT MONEY TO BUY MORE.: PATIENT DECLINED

## 2024-08-23 SDOH — ECONOMIC STABILITY: TRANSPORTATION INSECURITY: IN THE PAST 12 MONTHS, HAS LACK OF TRANSPORTATION KEPT YOU FROM MEDICAL APPOINTMENTS OR FROM GETTING MEDICATIONS?: NO

## 2024-08-23 SDOH — SOCIAL STABILITY: SOCIAL INSECURITY: DOES ANYONE TRY TO KEEP YOU FROM HAVING/CONTACTING OTHER FRIENDS OR DOING THINGS OUTSIDE YOUR HOME?: NO

## 2024-08-23 SDOH — ECONOMIC STABILITY: GENERAL

## 2024-08-23 SDOH — ECONOMIC STABILITY: TRANSPORTATION INSECURITY
IN THE PAST 12 MONTHS, HAS THE LACK OF TRANSPORTATION KEPT YOU FROM MEDICAL APPOINTMENTS OR FROM GETTING MEDICATIONS?: NO

## 2024-08-23 SDOH — SOCIAL STABILITY: SOCIAL INSECURITY: ARE YOU OR HAVE YOU BEEN THREATENED OR ABUSED PHYSICALLY, EMOTIONALLY, OR SEXUALLY BY ANYONE?: NO

## 2024-08-23 ASSESSMENT — ACTIVITIES OF DAILY LIVING (ADL)
GROOMING: INDEPENDENT
LACK_OF_TRANSPORTATION: NO
PATIENT'S MEMORY ADEQUATE TO SAFELY COMPLETE DAILY ACTIVITIES?: YES
LACK_OF_TRANSPORTATION: NO
JUDGMENT_ADEQUATE_SAFELY_COMPLETE_DAILY_ACTIVITIES: YES
ADEQUATE_TO_COMPLETE_ADL: YES
BATHING: INDEPENDENT
HEARING - RIGHT EAR: FUNCTIONAL
FEEDING YOURSELF: INDEPENDENT
WALKS IN HOME: INDEPENDENT
HEARING - LEFT EAR: FUNCTIONAL
TOILETING: INDEPENDENT
DRESSING YOURSELF: INDEPENDENT

## 2024-08-23 ASSESSMENT — PAIN SCALES - GENERAL
PAINLEVEL_OUTOF10: 0 - NO PAIN
PAINLEVEL_OUTOF10: 0 - NO PAIN
PAINLEVEL_OUTOF10: 7
PAINLEVEL_OUTOF10: 2
PAINLEVEL_OUTOF10: 5 - MODERATE PAIN
PAINLEVEL_OUTOF10: 7
PAINLEVEL_OUTOF10: 0 - NO PAIN
PAINLEVEL_OUTOF10: 2
PAINLEVEL_OUTOF10: 0 - NO PAIN
PAIN_LEVEL: 1
PAINLEVEL_OUTOF10: 0 - NO PAIN

## 2024-08-23 ASSESSMENT — COLUMBIA-SUICIDE SEVERITY RATING SCALE - C-SSRS
1. IN THE PAST MONTH, HAVE YOU WISHED YOU WERE DEAD OR WISHED YOU COULD GO TO SLEEP AND NOT WAKE UP?: NO
2. HAVE YOU ACTUALLY HAD ANY THOUGHTS OF KILLING YOURSELF?: NO
2. HAVE YOU ACTUALLY HAD ANY THOUGHTS OF KILLING YOURSELF?: NO
6. HAVE YOU EVER DONE ANYTHING, STARTED TO DO ANYTHING, OR PREPARED TO DO ANYTHING TO END YOUR LIFE?: NO
1. IN THE PAST MONTH, HAVE YOU WISHED YOU WERE DEAD OR WISHED YOU COULD GO TO SLEEP AND NOT WAKE UP?: NO
6. HAVE YOU EVER DONE ANYTHING, STARTED TO DO ANYTHING, OR PREPARED TO DO ANYTHING TO END YOUR LIFE?: NO

## 2024-08-23 ASSESSMENT — LIFESTYLE VARIABLES
HOW OFTEN DO YOU HAVE 6 OR MORE DRINKS ON ONE OCCASION: NEVER
AUDIT-C TOTAL SCORE: 0
SUBSTANCE_ABUSE_PAST_12_MONTHS: NO
PRESCIPTION_ABUSE_PAST_12_MONTHS: NO
AUDIT-C TOTAL SCORE: 0
HOW OFTEN DO YOU HAVE A DRINK CONTAINING ALCOHOL: NEVER
SKIP TO QUESTIONS 9-10: 1
HOW MANY STANDARD DRINKS CONTAINING ALCOHOL DO YOU HAVE ON A TYPICAL DAY: PATIENT DOES NOT DRINK

## 2024-08-23 ASSESSMENT — COGNITIVE AND FUNCTIONAL STATUS - GENERAL
DAILY ACTIVITIY SCORE: 24
PATIENT BASELINE BEDBOUND: NO
WALKING IN HOSPITAL ROOM: A LITTLE
CLIMB 3 TO 5 STEPS WITH RAILING: A LITTLE
MOBILITY SCORE: 22

## 2024-08-23 ASSESSMENT — SOCIAL DETERMINANTS OF HEALTH (SDOH): IN THE PAST 12 MONTHS, HAS THE ELECTRIC, GAS, OIL, OR WATER COMPANY THREATENED TO SHUT OFF SERVICE IN YOUR HOME?: YES

## 2024-08-23 ASSESSMENT — PATIENT HEALTH QUESTIONNAIRE - PHQ9
SUM OF ALL RESPONSES TO PHQ9 QUESTIONS 1 & 2: 0
2. FEELING DOWN, DEPRESSED OR HOPELESS: NOT AT ALL
1. LITTLE INTEREST OR PLEASURE IN DOING THINGS: NOT AT ALL

## 2024-08-23 NOTE — ANESTHESIA PREPROCEDURE EVALUATION
Patient: Janette Panda    Procedure Information       Date/Time: 08/23/24 0715    Procedure: Right partial Calcanectomy // Insertion of antibiotic spacer, two-stage approach for reconstruction (Right: Foot)    Location: [facility] OR  / [facility] OR    Surgeons: José Miguel Lee MD            Relevant Problems   Anesthesia (within normal limits)      Cardiac (within normal limits)      Pulmonary   (+) Asthmatic bronchitis (HHS-HCC)   (+) Lung cancer (Multi)      Neuro (within normal limits)      GI (within normal limits)      Liver (within normal limits)      Hematology   (+) Anemia      ID   (+) Acute osteomyelitis of right calcaneus (Multi)   (+) Hardware complicating wound infection (CMS-HCC)   (+) Osteomyelitis, acute (Multi)   (+) Polymicrobial bacterial infection   (+) Subacute osteomyelitis of right foot (Multi)   (+) Viral upper respiratory infection       Clinical information reviewed:   Tobacco  Allergies    Med Hx  Surg Hx  OB Status  Fam Hx  Soc Hx        NPO Detail:  No data recorded     Physical Exam    Airway  Mallampati: I  TM distance: >3 FB     Cardiovascular   Rhythm: regular  Rate: normal     Dental   Comments: Poor dentition   Pulmonary - normal exam  Breath sounds clear to auscultation     Abdominal      Other findings: Patient says some molars are loose.        Anesthesia Plan    History of general anesthesia?: yes  History of complications of general anesthesia?: no    ASA 3     general   (Plan GA by oett.)  The patient is not a current smoker.    intravenous induction   Postoperative administration of opioids is intended.  Trial extubation is planned.  Anesthetic plan and risks discussed with patient.  Use of blood products discussed with patient who consented to blood products.    Plan discussed with resident and attending.

## 2024-08-23 NOTE — ANESTHESIA PROCEDURE NOTES
Airway  Date/Time: 8/23/2024 7:57 AM  Urgency: elective    Airway not difficult    Staffing  Performed: resident   Authorized by: Crow Mckinley MD    Performed by: Robert Huff MD  Patient location during procedure: OR    Indications and Patient Condition  Indications for airway management: anesthesia  Spontaneous Ventilation: absent  Sedation level: deep  Preoxygenated: yes  Patient position: sniffing  Mask difficulty assessment: 1 - vent by mask  Planned trial extubation    Final Airway Details  Final airway type: endotracheal airway      Successful airway: ETT     Successful intubation technique: direct laryngoscopy  Facilitating devices/methods: intubating stylet  Endotracheal tube insertion site: oral  Blade: Florencio  Blade size: #3  ETT size (mm): 7.0  Cormack-Lehane Classification: grade I - full view of glottis  Placement verified by: chest auscultation and capnometry   Measured from: lips  ETT to lips (cm): 22  Number of attempts at approach: 1

## 2024-08-23 NOTE — ANESTHESIA PROCEDURE NOTES
Peripheral IV  Date/Time: 8/23/2024 8:00 AM      Placement  Needle size: 20 G  Laterality: left  Location: hand  Site prep: alcohol  Technique: anatomical landmarks  Attempts: 1

## 2024-08-23 NOTE — DISCHARGE INSTRUCTIONS
Follow-Up Instructions  You will need to be seen in clinic by Dr Lee in 2 weeks for a post-operative evaluation.      You will need to call and schedule an appointment, unless there is a previous appointment that appears on your discharge instructions.  The direct orthopaedic clinic appointment line phone number is [phone].  Please do not delay in calling to make this appointment.    You should also follow up with your primary care provider in 1-2 weeks.    Activity Restrictions  1) No driving until further instructed by your orthopaedic physician, which will be addressed at your outpatient appointments.    2) No driving or operating heavy machinery while taking narcotic pain medication.    3) Weight bearing status --> Nonweightbearing right leg in splint.     Discharge Medications  You have been sent home with the following home medications: Oxycodone, Colace, and Aspirin.  Please wean yourself off the oxycodone, as tolerated. A good time to take the medication is before physical therapy sessions and bedtime. Colace is a stool softener to reduce the narcotic pain medications cause. Take it twice a day while taking narcotic pain medication to ensure you maintain your regular bowel movement frequency. Baby aspirin is taken twice daily to help reduce your risk of blood clots.    You should also take tylenol 650mg every 6 hours as needed to reduce the amount of oxycodone you need for pain.    Splint/Cast care instructions:   1) Keep your splint on until your follow up visit with your surgeon.    2) Do not get your splint/cast wet for any reason. This includes protecting it from shower water, bath water, and the rain. If the cast/splint becomes wet for any reason, you need to be seen immediately, either in the emergency department or in the first available clinic appointment, in order to have the splint/cast changed. Allowing a wet splint/cast to sit on your skin may cause skin breakdown and infection.    3)  Do not stick any sharp objects (knives, forks, clothes hangers, etc) inside your splint/cast to itch. These objects scratch the skin, which may become infected. Alternatively, you may blow a hair dryer, on the cool air setting, in order to provide some relief.    4) You should keep your operative or injured extremity elevated at or above the level of your heart for the first 48-72 hours. This will help minimize the swelling in the immediate aftermath from surgery or from an acute fracture/injury.    5) You may ice your injured/operative extremity, which is especially useful to minimize swelling, in the first 48-72 hours. Make sure that the ice is not in direct contact with your skin, and that the ice does not leak out of it's bag. It will take ~30 minutes for the ice/cooling to move through your splint/cast material, but it will do so. Double-bagging ice is an effective technique.    6) If you begin to experience progressive and rapidly increasing pain that seems out of proportion to what you normally have been experiencing from your baseline pain after surgery/injury, or if your hand or foot become numb or turn blue and cold - you NEED TO CALL US IMMEDIATELY. Alternatively, you may come into the emergency department IMMEDIATELY for an emergent evaluation.

## 2024-08-23 NOTE — ANESTHESIA PROCEDURE NOTES
Peripheral Block    Patient location during procedure: pre-op  Start time: 8/23/2024 7:05 AM  End time: 8/23/2024 7:18 AM  Reason for block: at surgeon's request and post-op pain management  Staffing  Performed: resident   Authorized by: Capo Cobb DO    Performed by: Neena Cruz MD  Preanesthetic Checklist  Completed: patient identified, IV checked, site marked, risks and benefits discussed, surgical consent, monitors and equipment checked, pre-op evaluation and timeout performed   Timeout performed at: 8/23/2024 7:05 AM  Peripheral Block  Patient position: laying flat  Prep: ChloraPrep  Patient monitoring: heart rate and continuous pulse ox  Block type: popliteal and adductor canal  Laterality: right  Injection technique: single-shot  Guidance: ultrasound guided  Local infiltration: ropivacaine  Needle  Needle type: Tuohy   Needle gauge: 26 G  Needle length: 8 cm  Needle localization: ultrasound guidance     image stored in chart  Assessment  Injection assessment: negative aspiration for heme, no paresthesia on injection, incremental injection and local visualized surrounding nerve on ultrasound  Additional Notes  Popliteal and saphenous nerve block: Informed consent obtained.  Risks, benefits, and alternatives discussed.  ASA monitors placed and timeout performed.  Patient positioned, prepped with chlorhexidine, and draped with sterile towels.  Ultrasound guidance was used to visualize the tibia and common peroneal nerves at the popliteal fossa and surrounding structures with visualization of the needle throughout duration of the procedure.  Aspiration was negative.  15 cc of 0.5% ropivacaine, dexamethasone 4 mg, and 1:200,000 epinephrine injected.      Ultrasound guidance was used to visualize the saphenous nerve at the adductor canal and surrounding structures with visualization of the needle throughout duration of the procedure.  Aspiration was negative.  10 cc of 0.5% ropivacaine, dexamethasone 4 mg,  and 1:200,000 epinephrine injected.  Patient tolerated the procedure well.    Timeout by KEYONNA Camargo

## 2024-08-23 NOTE — PROGRESS NOTES
Pharmacy Medication History Review    Janette Panda is a 39 y.o. female admitted for Draining postoperative wound. Pharmacy reviewed the patient's [id number] medications and allergies for accuracy.    Medications ADDED:  None  Medications CHANGED:  None  Medications REMOVED:   None    The list below reflects the updated PTA list.   Prior to Admission Medications   Prescriptions Last Dose Informant   Symbicort 80-4.5 mcg/actuation inhaler More than a month    Sig: Symbicort 80-4.5 MCG/ACT Inhalation Aerosol  Quantity: 10   Refills: 0  Start: 28-Dec-2022   Tylenol Extra Strength 500 mg tablet 8/23/2024    Sig: Take 1-2 tablets (500-1,000 mg) by mouth every 8 hours if needed for mild pain (1 - 3) or moderate pain (4 - 6).   albuterol 90 mcg/actuation inhaler Unknown    Sig: Inhale 1 puff if needed.   chlorhexidine (Hibiclens) 4 % external liquid 8/22/2024    Sig: Apply topically once daily as needed for wound care for up to 5 days.   gabapentin (Neurontin) 300 mg capsule Past Week    Sig: Take 1 capsule (300 mg) by mouth 3 times a day as needed (nerve pain).   montelukast (Singulair) 10 mg tablet Past Week    Sig: Take 1 tablet (10 mg) by mouth once daily.   oxyCODONE (Roxicodone) 10 mg immediate release tablet 8/16/2024    Sig: Take 1 tablet (10 mg) by mouth every 6 hours if needed for severe pain (7 - 10). 1 TO 2 TIMES A DAY, AS NEEDED FOR SEVERE BREAKTHROUGH PAIN   phenylephrine (Sudafed PE) 10 mg tablet More than a month    Sig: Take 1 tablet (10 mg) by mouth every 4 hours if needed.      Facility-Administered Medications: None      The list below reflects the updated allergy list. Please review each documented allergy for additional clarification and justification.  Allergies  Reviewed by Mabel Marquez on 8/23/2024        Severity Reactions Comments    Amitriptyline High Itching, Other nausea    Tetracyclines High Nausea/vomiting Vomiting     Adhesive Tape-silicones Medium Itching     Latex Low Unknown  "    Other Low Itching, Other Tegaderm Absorbent Dressing -itching [id number] Standard - blisters and rash    Tramadol Low Other N/V          Patient accepts M2B at discharge.     Sources:   Last fill history through pharmacy  Last office visit 08/06  [facility]  Patient reported    Additional Comments:  Patient was a good historian; able to state names, doses, and frequencies without prompting  Patient seems compliant with medications    EDIS LOCO  PGY-1 Pharmacy Resident, [facility]   08/23/24     Secure Chat preferred   If no response call [phone] or Vocera \"Med Rec\"    "

## 2024-08-23 NOTE — PROGRESS NOTES
Janette Panda is a 39 y.o. year old female patient who presents for Right partial Calcanectomy / Insertion of antibiotic spacer, two-stage approach for reconstruction  with Dr. Lee. Acute Pain consulted for block for postoperative pain control.     Anticipated Postop Pain Issues -   Palliative: typically relieved with IV analgesics and regional local anesthetics  Provocative: typically with movement  Quality: typically burning and aching  Radiation: typically none  Severity: typically severe 8-10/10  Timing: typically constant    Past Medical History:   Diagnosis Date    Acute osteomyelitis of right calcaneus (Multi)     Anemia     Anxiety     Arthritis     Asthma (HHS-HCC)     Metastatic lung cancer (metastasis from lung to other site) (Multi)     Personal history of other diseases of the respiratory system 2021    History of sinus problem    PONV (postoperative nausea and vomiting)     PTSD (post-traumatic stress disorder)         Past Surgical History:   Procedure Laterality Date    APPENDECTOMY      OTHER SURGICAL HISTORY  09/15/2020    I & D right calcaneus        Family History   Problem Relation Name Age of Onset    Ovarian cancer Mother      Drug abuse Father          Social History     Socioeconomic History    Marital status: Single     Spouse name: Not on file    Number of children: Not on file    Years of education: Not on file    Highest education level: Not on file   Occupational History    Not on file   Tobacco Use    Smoking status: Former     Current packs/day: 0.00     Types: Cigarettes     Quit date:      Years since quittin.6    Smokeless tobacco: Never   Substance and Sexual Activity    Alcohol use: Yes     Comment: social    Drug use: Yes     Types: Marijuana    Sexual activity: Defer   Other Topics Concern    Not on file   Social History Narrative    Not on file     Social Determinants of Health     Financial Resource Strain: Medium Risk (2023)    Received from  [email or web address]    Overall Financial Resource Strain (CARDIA)     Difficulty of Paying Living Expenses: Somewhat hard   Food Insecurity: Food Insecurity Present (9/5/2023)    Received from [email or web address]    Hunger Vital Sign     Worried About Running Out of Food in the Last Year: Never true     Ran Out of Food in the Last Year: Sometimes true   Transportation Needs: No Transportation Needs (9/5/2023)    Received from [email or web address]    PRAPARE - Transportation     Lack of Transportation (Medical): No     Lack of Transportation (Non-Medical): No   Physical Activity: Inactive (9/5/2023)    Received from [email or web address]    Exercise Vital Sign     Days of Exercise per Week: 0 days     Minutes of Exercise per Session: 0 min   Stress: No Stress Concern Present (9/5/2023)    Received from [email or web address]    [facility]ational [facility] - Occupational Stress Questionnaire     Feeling of Stress : Only a little   Social Connections: Unknown (9/5/2023)    Received from [email or web address]    Social Connection and Isolation Panel [NHANES]     Frequency of Communication with Friends and Family: Twice a week     Frequency of Social Gatherings with Friends and Family: Patient declined     Attends Jehovah's witness Services: Never     Active Member of Clubs or Organizations: No     Attends Club or Organization Meetings: Never     Marital Status: Never    Intimate Partner Violence: Not At Risk (9/5/2023)    Received from [email or web address]    Humiliation, Afraid, Rape, and Kick questionnaire     Fear of Current or Ex-Partner: No     Emotionally Abused: No     Physically Abused: No     Sexually Abused: No   Housing Stability: Low Risk  (9/5/2023)    Received from [email or web address]    Housing Stability Vital Sign     Unable to Pay for Housing in the Last Year: No     Number of Places Lived in the Last Year: 1     Unstable Housing in the Last Year: No         Allergies   Allergen Reactions    Amitriptyline Itching and Other     nausea    Tetracyclines Nausea/vomiting     Vomiting     Adhesive Tape-Silicones Itching    Latex Unknown    Other Itching and Other     Tegaderm Absorbent Dressing -itching  [id number] Standard - blisters and rash    Tramadol Other     N/V         Review of Systems  Gen: No fatigue, anorexia, insomnia, fever.   Eyes: No vision loss, double vision, drainage, eye pain.   ENT: No pharyngitis, dry mouth, no hearing changes or ear discharge  Cardiac: No chest pain, palpitations, syncope, near syncope.   Pulmonary: No shortness of breath, cough, hemoptysis.   Heme/lymph: No swollen glands, fever, bleeding.   GI: No abdominal pain, change in bowel habits, melena, hematemesis, hematochezia, nausea, vomiting, diarrhea.   : No discharge, dysuria, frequency, urgency, hematuria.  Endo: No polyuria or weight loss.   Musculoskeletal: Negative for any pain or loss of ROM/weakness  Skin: No rashes or lesions  Neuro: Normal speech, no numbness or weakness. No gait difficulties  Review of systems is otherwise negative unless stated above or in history of present illness.    Physical Exam:  Constitutional:  no distress, alert and cooperative  Eyes: clear sclera  Head/Neck: No apparent injury, trachea midline  Respiratory/Thorax: Patent airways, thorax symmetric, breathing comfortably  Cardiovascular: no pitting edema  Gastrointestinal: Nondistended  Musculoskeletal: ROM intact  Extremities: no clubbing  Neurological: alert, barbosa x4  Psychological: Appropriate affect    Results for orders placed or performed during the hospital encounter of 08/23/24 (from the past 24 hour(s))   POCT pregnancy, urine manually resulted   Result Value Ref Range    Preg Test, Ur Negative Negative        Plan:    - R popliteal/saphenous single shot blocks performed preoperatively on 08/23/24   - Pain medications per primary team  - Will see on POD1 if inpatient    Acute Pain Team  pg  [phone]  [phone].

## 2024-08-23 NOTE — OP NOTE
Right partial Calcanectomy // Insertion of antibiotic spacer and secondary wound closure (R) Operative Note     Date: 2024  OR Location: [facility] OR    Name: Janette Panda, : 1985, Age: 39 y.o., MRN: [id number], Sex: female    Diagnosis  Pre-op Diagnosis      * Acute osteomyelitis of right calcaneus (Multi) [M86.171]     * Draining postoperative wound, subsequent encounter [T81.89XD] Post-op Diagnosis     * Acute osteomyelitis of right calcaneus (Multi) [M86.171]     * Draining postoperative wound, subsequent encounter [T81.89XD]     Procedures  Right partial Calcanectomy // Insertion of antibiotic spacer and secondary wound closure  60000 - FL PARTIAL EXCISION BONE TALUS/CALCANEUS    FL INSERTION DRUG DELIVERY IMPLANT [02330]  FL SECONDARY CLOSURE SURG WOUND/DEHSN XTNSV/COMP [97519]  Surgeons      * José Miguel Lee - Primary    Resident/Fellow/Other Assistant:  Surgeons and Role:     * Colt Spencer MD - Resident - Assisting     * RADHA Multani-C - YUE First Assist: She was my primary assistant for this procedure.  There no available residents to assist with for this procedure.  Her assistance was needed and critical to the success of this procedure.  She assisted with surgical exposure, placement of retractors and implantation of implant/prosthesis and wound closure      Procedure Summary  Anesthesia: General  ASA: III  Anesthesia Staff: Anesthesiologist: Crow Mckinley MD  Anesthesia Resident: Robert Huff MD  Estimated Blood Loss: 20 mL  Intra-op Medications:   Administrations occurring from 0715 to 1020 on 24:   Medication Name Total Dose   vancomycin (Vancocin) vial for injection 2 g   tobramycin (Nebcin) injection 1,200 mg   sodium chloride 0.9 % irrigation solution 4,000 mL   cefTRIAXone (Rocephin) vial 1 g   gentamicin (Garamycin) 80 mg in sodium chloride 0.9 % 1 mL irrigation 3 mL              Anesthesia Record               Intraprocedure I/O Totals          Intake     Tranexamic Acid 0.00 mL    The total shown is the total volume documented since Anesthesia Start was filed.    Total Intake 0 mL          Specimen:   ID Type Source Tests Collected by Time   1 : RIGHT CALCANEOUS. HISTORY OF METASTATIC LUNG CANCER. Tissue BONE RESECTION SURGICAL PATHOLOGY EXAM José Miguel Lee MD 8/23/2024 0830   A : RIGHT CALCANEOUS CULTURE #1 Tissue ABSCESS AFB CULTURE/SMEAR, FUNGAL CULTURE/SMEAR, TISSUE/WOUND CULTURE/SMEAR José Miguel Lee MD 8/23/2024 0827   B : RIGHT CALCANEOUS CULTURE #2 Swab ABSCESS FUNGAL CULTURE/SMEAR, TISSUE/WOUND CULTURE/SMEAR José Miguel Lee MD 8/23/2024 0829   C : RIGHT CALCANEOUS CULTURE #3 Swab ABSCESS FUNGAL CULTURE/SMEAR, TISSUE/WOUND CULTURE/SMEAR José Miguel Lee MD 8/23/2024 0829        Staff:   Circulator: Janet Liu Person: Gallito         Drains and/or Catheters: * None in log *    Tourniquet Times:     Total Tourniquet Time Documented:  Thigh (Right) - 20 minutes  Total: Thigh (Right) - 20 minutes      Implants:  Implants       Type Name Action Serial No.      Joint CEMENT, BONE SIMPLEX P W/TOBRA - [id number] Implanted               Findings: see below    Indications: Janette Panda is an 39 y.o. female who is having surgery for Acute osteomyelitis of right calcaneus (Multi) [M86.171]  Draining postoperative wound, subsequent encounter [T81.89XD].     The patient was seen in the preoperative area. The risks, benefits, complications, treatment options, non-operative alternatives, expected recovery and outcomes were discussed with the patient. The possibilities of reaction to medication, pulmonary aspiration, injury to surrounding structures, bleeding, recurrent infection, the need for additional procedures, failure to diagnose a condition, and creating a complication requiring transfusion or operation were discussed with the patient. The patient concurred with the proposed plan, giving informed consent.  The site of surgery was properly  noted/marked if necessary per policy. The patient has been actively warmed in preoperative area. Preoperative antibiotics have been ordered and given within 1 hours of incision. Venous thrombosis prophylaxis have been ordered including bilateral sequential compression devices and chemical prophylaxis    Procedure Details:   We proceeded to the operating.  Appropriate time was performed.  General esthesia was initiated by the anesthesia team.  Patient received preoperative antibiotics and transonic acid to minimize risk of bleeding.  She was then transferred to operative table placed in the prone position.  All bony prominences adequately padded.  A well-padded tourniquet was placed.  We used gravity exsanguination and tourniquet was inflated.  Her previous sinus tract was marked out.  We perform an elliptical long incision of the previous sinus tract.  We made full-thickness flaps exposing the calcaneus posteriorly.  Once he removed the soft tissue over the posterior aspect of the heel was noted the patient had a intraosseous abscess.  This was foul-smelling.  We resected the intraosseous abscess.  Antibiotic good bone.  We then used an osteotome and resected that cavity of the abscess within the bone and this corresponded to the area on the MRI consistent with nonviable bone.  This debridement was extensive.  We sent cultures and we also send specimen for pathology given her history of metastatic lung cancer.  Once we were satisfied with the resection of the bone we then deflated the tourniquet.  We copiously irrigated the wound.  At this point we evaluated the bone cavity and noted that we had circumferential bleeding bone.  I was very pleased with this resection that was performed.  We then also irrigated with Irrisept solution.  Based on her previous cultures sensitivity was to ceftriaxone and gentamicin.  We placed 1 vial of gentamicin antibiotic liquid in the bone cavity.  Once we let this absorb we suctioned  the excess off.  On the back table we next Simplex cement that came with gentamicin we added 1 g of vancomycin and 1 g of ceftriaxone.  This was mixed and when the cement was viscous we filled the bone cavity with the cement for local antibiotic delivery and also to fill out the bone void and minimized collection of fluid.  Once the cement began to harden we irrigated to prevent effect of thermal necrosis.  The residual ceftriaxone powder and vancomycin powder and tobramycin powder were then sprinkled in the wound bed.  We had good hemostasis.  I used a knife and elevated flaps and was able to get the wound closed in layered fashion.  Of note her Achilles tendon was attached and she had Morales test with positive response.  Sterile dressing was applied.  Patient was placed in well-padded splint.  She was awakened from general anesthesia transferred to the hospital bed and placed in the supine position.  Complications:  None; patient tolerated the procedure well.    Disposition: PACU - hemodynamically stable.  Condition: stable         Additional Details:   Plan  Patient will be admitted to the hospital for IV antibiotics broad-spectrum pending intraoperative cultures.  I recommend DVT prophylaxis.  Will consult infectious disease.  Patient will be nonweightbearing in the splint.  In the office we will obtain x-ray of the right calcaneus 3 views    Attending Attestation: I was present and scrubbed for the entire procedure.    José Miguel Lee  Phone Number: [phone]

## 2024-08-23 NOTE — PROGRESS NOTES
"Orthopaedic Surgery Progress Note    S:  Evaluated immediately postoperatively in PACU. Pain well controlled. Denies chest pain, shortness of breath, or fevers.    O:  /57   Pulse 76   Temp 36 °C (96.8 °F) (Temporal)   Resp 18   Ht 1.651 m (5' 5\")   Wt 70.8 kg (156 lb 1.4 oz)   SpO2 98%   BMI 25.97 kg/m²     Gen: arousable, NAD, appropriately conversational  Cardiac: extremities warm  Resp: nonlabored on RA  GI: soft, nondistended    MSK:  Right Lower Extremity:   -Splint c/d/i  -wiggles toes  -SILT to toes  -Toes warm, well perfused  -Bbrisk cap refill  -Compartments soft and compressible above and below splint    A/P: 39 y.o. female s/p R calcanectomy, placement of antibiotic spacer, complex wound closure  on 8/23 with Dr. Lee.      Plan:  - Weight bearing: NWB RLE in splint  - DVT ppx: SCDs, ASA 81 bid  - ID consult  - Intra-op CX pending  - Intra-op Path pending  - blood cx ordered in anticipation of PICC  - Diet: Regular  - Pain: Tylenol, oxycodone 5/10  - Antibiotics: Vanc/Zosyn, pending ID recs  - FEN: HLIV with good PO intake  - Bowel Regimen: Colace, senna, dulcolax  - PT/OT  - Pulm: Encourage IS  - Continue home medications  - No osorio    Dispo: Pending ID, cultures, path, PT, and likely PICC    Colt Spencer MD  PGY-3 Orthopedic Surgery  [facility]  My[employer] Chat Preferred  Pager: [phone]    While inpatient, this patient will be followed by the Orthopaedic Trauma team. Please see contact information below:      First call: Clayton Kiser, PGY-1, Gerry Rice, PGY-2  Second call: Arvind Winter PGY-2   Third call: Colt Spencer, PGY-3    6pm-6am M-F, Holidays, and weekends page Ortho on-call @[phone] with urgent questions/concerns.          "

## 2024-08-23 NOTE — CONSULTS
Vancomycin Dosing by Pharmacy- INITIAL    Janette Panda is a 39 y.o. year old female who Pharmacy has been consulted for vancomycin dosing for osteomyelitis/septic arthritis. Based on the patient's indication and renal status this patient will be dosed based on a goal AUC of 400-600.     Renal function is currently stable.    Visit Vitals  /67   Pulse 69   Temp 36.3 °C (97.3 °F) (Tympanic)   Resp 18        Lab Results   Component Value Date    CREATININE 0.82 2024    CREATININE 0.90 2024    CREATININE 0.77 01/10/2024    CREATININE 0.75 10/17/2023        Patient weight is as follows:   Vitals:    24 0700   Weight: 70.8 kg (156 lb 1.4 oz)       Cultures:  No results found for the encounter in last 14 days.        No intake/output data recorded.  I/O during current shift:  I/O this shift:  In: 300 [IV Piggyback:300]  Out: 925 [Urine:900; Blood:25]    Temp (24hrs), Av.1 °C (97 °F), Min:36 °C (96.8 °F), Max:36.3 °C (97.3 °F)         Assessment/Plan     Patient will not be given a loading dose.  Will initiate vancomycin maintenance,  1,250 mg every 12 hours.    This dosing regimen is predicted by [employer] to result in the following pharmacokinetic parameters:    Loading dose: N/A  Regimen: 1250 mg IV every 12 hours.  Start time: 14:38 on 2024  Exposure target: AUC24 (range)400-600 mg/L.hr   AUC24,ss: 518 mg/L.hr  Probability of AUC24 > 400: 81 %  Ctrough,ss: 12.8 mg/L  Probability of Ctrough,ss > 20: 14 %  Probability of nephrotoxicity (Lodise JEFF ): 8 %    Follow-up level will be ordered on  at 0500 unless clinically indicated sooner.  Will continue to monitor renal function daily while on vancomycin and order serum creatinine at least every 48 hours if not already ordered.  Follow for continued vancomycin needs, clinical response, and signs/symptoms of toxicity.       Ching Gordon, PharmD

## 2024-08-23 NOTE — BRIEF OP NOTE
Date: 2024  OR Location: [facility] OR    Name: Janette Panda, : 1985, Age: 39 y.o., MRN: [id number], Sex: female    Diagnosis  Pre-op Diagnosis      * Acute osteomyelitis of right calcaneus (Multi) [M86.171]     * Draining postoperative wound, subsequent encounter [T81.89XD] Post-op Diagnosis     * Acute osteomyelitis of right calcaneus (Multi) [M86.171]     * Draining postoperative wound, subsequent encounter [T81.89XD]     Procedures  Right partial Calcanectomy // Insertion of antibiotic spacer and secondary wound closure  51436 - MA PARTIAL EXCISION BONE TALUS/CALCANEUS    MA INSERTION DRUG DELIVERY IMPLANT [[id number]]  MA SECONDARY CLOSURE SURG WOUND/DEHSN XTNSV/COMP [[id number]]  Surgeons      * José Miguel Lee - Primary    Resident/Fellow/Other Assistant:  Surgeons and Role:     * Colt Spencer MD - Resident - Assisting     * Park Ashford PA-C - YUE First Assist    Procedure Summary  Anesthesia: General  ASA: III  Anesthesia Staff: Anesthesiologist: Crow Mckinley MD  Anesthesia Resident: Robert Huff MD  Estimated Blood Loss: 20mL  Intra-op Medications:   Administrations occurring from 0715 to 1020 on 24:   Medication Name Total Dose   vancomycin (Vancocin) vial for injection 2 g   tobramycin (Nebcin) injection 1,200 mg   sodium chloride 0.9 % irrigation solution 4,000 mL   cefTRIAXone (Rocephin) vial 1 g   gentamicin (Garamycin) 80 mg in sodium chloride 0.9 % 1 mL irrigation 3 mL              Anesthesia Record               Intraprocedure I/O Totals          Intake    Tranexamic Acid 0.00 mL    The total shown is the total volume documented since Anesthesia Start was filed.    Total Intake 0 mL          Specimen:   ID Type Source Tests Collected by Time   1 : RIGHT CALCANEOUS. HISTORY OF METASTATIC LUNG CANCER. Tissue BONE RESECTION SURGICAL PATHOLOGY EXAM José Miguel Lee MD 2024 0830   A : RIGHT CALCANEOUS CULTURE #1 Tissue ABSCESS AFB CULTURE/SMEAR, FUNGAL CULTURE/SMEAR,  TISSUE/WOUND CULTURE/SMEAR José Miguel Lee MD 8/23/2024 0827   B : RIGHT CALCANEOUS CULTURE #2 Swab ABSCESS FUNGAL CULTURE/SMEAR, TISSUE/WOUND CULTURE/SMEAR José Miguel Lee MD 8/23/2024 0829   C : RIGHT CALCANEOUS CULTURE #3 Swab ABSCESS FUNGAL CULTURE/SMEAR, TISSUE/WOUND CULTURE/SMEAR José Miguel Lee MD 8/23/2024 0829        Staff:   Circulator: Janet Mahmoodub Person: Gallito          Findings: see operative report    Complications:  None; patient tolerated the procedure well.     Disposition: PACU - hemodynamically stable.  Condition: stable  Specimens Collected:   ID Type Source Tests Collected by Time   1 : RIGHT CALCANEOUS. HISTORY OF METASTATIC LUNG CANCER. Tissue BONE RESECTION SURGICAL PATHOLOGY EXAM José Miguel Lee MD 8/23/2024 0830   A : RIGHT CALCANEOUS CULTURE #1 Tissue ABSCESS AFB CULTURE/SMEAR, FUNGAL CULTURE/SMEAR, TISSUE/WOUND CULTURE/SMEAR José Miguel Lee MD 8/23/2024 0827   B : RIGHT CALCANEOUS CULTURE #2 Swab ABSCESS FUNGAL CULTURE/SMEAR, TISSUE/WOUND CULTURE/SMEAR José Miguel Lee MD 8/23/2024 0829   C : RIGHT CALCANEOUS CULTURE #3 Swab ABSCESS FUNGAL CULTURE/SMEAR, TISSUE/WOUND CULTURE/SMEAR José Miguel Lee MD 8/23/2024 0829

## 2024-08-23 NOTE — CONSULTS
Inpatient consult to Infectious Diseases  Consult performed by: Fanny Conley MD  Consult ordered by: José Miguel Lee MD        Primary MD: Sylvie Allison MD  Reason For Consult R calcaneus OM    History Of Present Illness  Janette Panda is a 39 y.o. female with PMH of metastatic lung cancer (2020, completed therapy in 2022) to right calcaneus post radiation, s/p right calcaneus I&D on 7/19/2023, and c/b calcaneus fracture with significant malunion and impending ulceration of her heel, s/p surgical debridement and resection of malunited calcaneus and talus reattachment, and subsequent osteomyelitis s/p multiple debridements and antibiotic treatment, on chronic bactrim given Serratia (3/17/2024) since 7/24/23 (now off since 8/6) presented with drainage from the wound and concern for persistent osteomyelitis from MRI 7/30 .       From the chart review, patient had questionable metastasis to the right calcaneus from lung cancer that was treated with radiation.  She subsequently sustained a tongue type calcaneus fracture, and developed significant malunion and impending ulceration of her heel.  She underwent surgical debridement and resection of malunited calcaneus and talus reattachment.  This was complicated by infection and subsequent osteomyelitis.  She has underwent multiple debridements and antibiotic treatment.  She was on oral Bactrim since 7/24/23. Patient reported noticed drainage from the wound, and it was even worse after stopping with bactrim as Ortho team to increase the possibility of culture from coming OR.      Patient underwent R calcanectomy, placement of antibiotic spacer, complex wound closure on 8/23, and found to have intraosseous abscess. Initiated with zosyn and vancomycin. Pathology (given hx of metastatic lung cancer) and culture are in process.     Denied fever or chill.      Past Medical History  She has a past medical history of Acute osteomyelitis of right calcaneus (Multi), Anemia,  "Anxiety, Arthritis, Asthma (HHS-HCC), Metastatic lung cancer (metastasis from lung to other site) (Multi), Personal history of other diseases of the respiratory system (01/19/2021), PONV (postoperative nausea and vomiting), and PTSD (post-traumatic stress disorder).    Surgical History  She has a past surgical history that includes Other surgical history (09/15/2020) and Appendectomy.     Allergies  Amitriptyline, Tetracyclines, Adhesive tape-silicones, Latex, Other, and Tramadol   Objective  Range of Vitals (last 24 hours)  Heart Rate:  [53-76]   Temp:  [36 °C (96.8 °F)-36.2 °C (97.2 °F)]   Resp:  [8-32]   BP: (107-130)/(57-81)   Height:  [165.1 cm (5' 5\")]   Weight:  [70.8 kg (156 lb 1.4 oz)]   SpO2:  [98 %-100 %]   Daily Weight  08/23/24 : 70.8 kg (156 lb 1.4 oz)    Body mass index is 25.97 kg/m².     Physical Exam  General: alert, able to answer questions  HEENT: no conjunctival injection. anicteric.  CVS: RRR. Normal S1 and S2. No m/r/g.   RESP: ctab no w/r/r, no increased wob  Abd: Soft and lax. ND.   Ext: R foot with dressing   Neuro: Answers questions appropriately.  Integumentary: no obvious lesions   Rheumatologic: No joint swelling or edema  Relevant Results    Labs  Results from last 72 hours   Lab Units 08/22/24  0911   WBC AUTO x10*3/uL 17.3*   HEMOGLOBIN g/dL 12.9   HEMATOCRIT % 41.2   PLATELETS AUTO x10*3/uL 480*   NEUTROS PCT AUTO % 69.6   LYMPHS PCT AUTO % 22.4   MONOS PCT AUTO % 7.3   EOS PCT AUTO % 0.1     Results from last 72 hours   Lab Units 08/22/24  0911   SODIUM mmol/L 135*   POTASSIUM mmol/L 3.4*   CHLORIDE mmol/L 98   CO2 mmol/L 25   BUN mg/dL 8   CREATININE mg/dL 0.82   GLUCOSE mg/dL 91   CALCIUM mg/dL 9.4   ANION GAP mmol/L 15   EGFR mL/min/1.73m*2 >90     Results from last 72 hours   Lab Units 08/22/24  0911   ALK PHOS U/L 100   BILIRUBIN TOTAL mg/dL 0.5   PROTEIN TOTAL g/dL 7.9   ALT U/L 18   AST U/L 18   ALBUMIN g/dL 4.4     Estimated Creatinine Clearance: 90 mL/min (by C-G formula " based on SCr of 0.82 mg/dL).  C-Reactive Protein   Date Value Ref Range Status   10/17/2023 0.55 <1.00 mg/dL Final   10/05/2023 0.30 <1.00 mg/dL Final     CRP   Date Value Ref Range Status   09/27/2023 1.21 (A) mg/dL Final     Comment:     REF VALUE  < 1.00       Sedimentation Rate   Date Value Ref Range Status   10/17/2023 35 (H) 0 - 20 mm/h Final   10/05/2023 16 0 - 20 mm/h Final   09/15/2023 17 0 - 20 mm/h Final   Imaging  IMPRESSION: MRI foot   Abnormal marrow signal in the posterior calcaneus at the calcaneal  body and calcaneal tuberosity with large osseous defect posteriorly  and formation of a large cavity within the bone. Underlying  osteomyelitis likely chronic is present. Lack of enhancement within  this osseous cavity with underlying intraosseous abscess formation in  the differential  Microbiology  8/22 MRSA swab  8/23 OR     Antimicrobial agents  8/22- Zosyn  8/22- Vancomycin    Assessment/Plan  # Right calcaneus osteomyelitis, s/p radiation for metastasis from lung cancer (2020), multiple hxs of calcaneus osteomyelitis/heel infection in setting of calcaneus fracture with significant malunion  # Chronic bactrim use for chronic suppression since 7/24/23 (now off since 8/6) given Serratia from cultures (3/17/2024)  # Tetracycline allergy (N/V)     A 38 yo female developed right calcaneus osteomyelitis s/p R calcanectomy, placement of antibiotic spacer, complex wound closure on 8/23. No sign of systemic infection. Was found to have intraosseous abscess at OR. Initiated with zosyn and vancomycin. Will await OR culture.     Recommendations  -Continue IV zosyn and IV vancomycin   -Will await OR culture   -Needs PICC line for longterm IV therapy     Discussed with the team and Dr. Doyle. Please text me via Epic chat if you have any questions or concerns regarding this patient. ID will continue to follow up this patient.    Fanny Conley MD  ID fellow [id number]  Team A   ID pager [phone]

## 2024-08-23 NOTE — PROGRESS NOTES
I spoke with Janette regarding discharge planning and home going needs. Patient lives home with her family where she is independent without assistive devices. Patient is not medically cleared for discharge at this time pending cultures, ID recommendations and possible PICC Line placement. I will continue to follow with a safe discharge plan.

## 2024-08-24 LAB
ACID FAST STN SPEC: NORMAL
ANION GAP SERPL CALC-SCNC: 12 MMOL/L (ref 10–20)
BUN SERPL-MCNC: 7 MG/DL (ref 6–23)
CALCIUM SERPL-MCNC: 8.3 MG/DL (ref 8.6–10.6)
CHLORIDE SERPL-SCNC: 101 MMOL/L (ref 98–107)
CO2 SERPL-SCNC: 28 MMOL/L (ref 21–32)
CREAT SERPL-MCNC: 0.9 MG/DL (ref 0.5–1.05)
EGFRCR SERPLBLD CKD-EPI 2021: 83 ML/MIN/1.73M*2
ERYTHROCYTE [DISTWIDTH] IN BLOOD BY AUTOMATED COUNT: 14.5 % (ref 11.5–14.5)
GLUCOSE SERPL-MCNC: 96 MG/DL (ref 74–99)
HCT VFR BLD AUTO: 32.9 % (ref 36–46)
HGB BLD-MCNC: 10.5 G/DL (ref 12–16)
MCH RBC QN AUTO: 28.5 PG (ref 26–34)
MCHC RBC AUTO-ENTMCNC: 31.9 G/DL (ref 32–36)
MCV RBC AUTO: 89 FL (ref 80–100)
MYCOBACTERIUM SPEC CULT: NORMAL
NRBC BLD-RTO: 0 /100 WBCS (ref 0–0)
PLATELET # BLD AUTO: 394 X10*3/UL (ref 150–450)
POTASSIUM SERPL-SCNC: 3.9 MMOL/L (ref 3.5–5.3)
RBC # BLD AUTO: 3.68 X10*6/UL (ref 4–5.2)
SODIUM SERPL-SCNC: 137 MMOL/L (ref 136–145)
VANCOMYCIN SERPL-MCNC: 33.7 UG/ML (ref 5–20)
WBC # BLD AUTO: 20.3 X10*3/UL (ref 4.4–11.3)

## 2024-08-24 PROCEDURE — 80048 BASIC METABOLIC PNL TOTAL CA: CPT

## 2024-08-24 PROCEDURE — 80202 ASSAY OF VANCOMYCIN: CPT

## 2024-08-24 PROCEDURE — 97161 PT EVAL LOW COMPLEX 20 MIN: CPT | Mod: GP

## 2024-08-24 PROCEDURE — 36415 COLL VENOUS BLD VENIPUNCTURE: CPT

## 2024-08-24 PROCEDURE — 87075 CULTR BACTERIA EXCEPT BLOOD: CPT

## 2024-08-24 PROCEDURE — 2500000004 HC RX 250 GENERAL PHARMACY W/ HCPCS (ALT 636 FOR OP/ED)

## 2024-08-24 PROCEDURE — 85027 COMPLETE CBC AUTOMATED: CPT

## 2024-08-24 PROCEDURE — 2500000001 HC RX 250 WO HCPCS SELF ADMINISTERED DRUGS (ALT 637 FOR MEDICARE OP)

## 2024-08-24 PROCEDURE — 1100000001 HC PRIVATE ROOM DAILY

## 2024-08-24 RX ADMIN — OXYCODONE HYDROCHLORIDE 5 MG: 5 TABLET ORAL at 16:06

## 2024-08-24 RX ADMIN — ASPIRIN 81 MG: 81 TABLET, COATED ORAL at 10:26

## 2024-08-24 RX ADMIN — ACETAMINOPHEN 650 MG: 325 TABLET ORAL at 12:49

## 2024-08-24 RX ADMIN — SENNOSIDES AND DOCUSATE SODIUM 2 TABLET: 50; 8.6 TABLET ORAL at 22:12

## 2024-08-24 RX ADMIN — ACETAMINOPHEN 650 MG: 325 TABLET ORAL at 18:02

## 2024-08-24 RX ADMIN — Medication 1 TABLET: at 10:26

## 2024-08-24 RX ADMIN — SODIUM CHLORIDE, POTASSIUM CHLORIDE, SODIUM LACTATE AND CALCIUM CHLORIDE 100 ML/HR: 600; 310; 30; 20 INJECTION, SOLUTION INTRAVENOUS at 03:43

## 2024-08-24 RX ADMIN — Medication 1 TABLET: at 22:12

## 2024-08-24 RX ADMIN — CYCLOBENZAPRINE 10 MG: 10 TABLET, FILM COATED ORAL at 16:06

## 2024-08-24 RX ADMIN — PIPERACILLIN SODIUM AND TAZOBACTAM SODIUM 3.38 G: 3; .375 INJECTION, SOLUTION INTRAVENOUS at 10:26

## 2024-08-24 RX ADMIN — ASPIRIN 81 MG: 81 TABLET, COATED ORAL at 22:12

## 2024-08-24 RX ADMIN — PIPERACILLIN SODIUM AND TAZOBACTAM SODIUM 3.38 G: 3; .375 INJECTION, SOLUTION INTRAVENOUS at 02:41

## 2024-08-24 RX ADMIN — PIPERACILLIN SODIUM AND TAZOBACTAM SODIUM 3.38 G: 3; .375 INJECTION, SOLUTION INTRAVENOUS at 14:59

## 2024-08-24 RX ADMIN — PIPERACILLIN SODIUM AND TAZOBACTAM SODIUM 3.38 G: 3; .375 INJECTION, SOLUTION INTRAVENOUS at 22:14

## 2024-08-24 RX ADMIN — VANCOMYCIN HYDROCHLORIDE 1250 MG: 1.25 INJECTION, POWDER, LYOPHILIZED, FOR SOLUTION INTRAVENOUS at 03:42

## 2024-08-24 RX ADMIN — OXYCODONE HYDROCHLORIDE 5 MG: 5 TABLET ORAL at 16:14

## 2024-08-24 RX ADMIN — OXYCODONE HYDROCHLORIDE 10 MG: 5 TABLET ORAL at 10:31

## 2024-08-24 RX ADMIN — OXYCODONE HYDROCHLORIDE 10 MG: 5 TABLET ORAL at 22:12

## 2024-08-24 RX ADMIN — ERGOCALCIFEROL 1250 MCG: 1.25 CAPSULE ORAL at 10:26

## 2024-08-24 ASSESSMENT — PAIN SCALES - GENERAL
PAINLEVEL_OUTOF10: 3
PAINLEVEL_OUTOF10: 0 - NO PAIN
PAINLEVEL_OUTOF10: 7
PAINLEVEL_OUTOF10: 7
PAINLEVEL_OUTOF10: 2
PAINLEVEL_OUTOF10: 3
PAINLEVEL_OUTOF10: 0 - NO PAIN
PAINLEVEL_OUTOF10: 8
PAINLEVEL_OUTOF10: 3
PAINLEVEL_OUTOF10: 8
PAINLEVEL_OUTOF10: 3
PAINLEVEL_OUTOF10: 0 - NO PAIN

## 2024-08-24 ASSESSMENT — PAIN - FUNCTIONAL ASSESSMENT
PAIN_FUNCTIONAL_ASSESSMENT: 0-10

## 2024-08-24 ASSESSMENT — COGNITIVE AND FUNCTIONAL STATUS - GENERAL
CLIMB 3 TO 5 STEPS WITH RAILING: A LITTLE
MOBILITY SCORE: 22
WALKING IN HOSPITAL ROOM: A LITTLE
DAILY ACTIVITIY SCORE: 24
CLIMB 3 TO 5 STEPS WITH RAILING: A LITTLE
CLIMB 3 TO 5 STEPS WITH RAILING: A LITTLE
WALKING IN HOSPITAL ROOM: A LITTLE
DAILY ACTIVITIY SCORE: 24
WALKING IN HOSPITAL ROOM: A LITTLE

## 2024-08-24 ASSESSMENT — ACTIVITIES OF DAILY LIVING (ADL): ADL_ASSISTANCE: INDEPENDENT

## 2024-08-24 NOTE — PROGRESS NOTES
Physical Therapy    Physical Therapy Evaluation    Patient Name: Janette Panda  MRN: [id number]  Today's Date: 8/24/2024   Time Calculation  Start Time: 0908  Stop Time: 0921  Time Calculation (min): 13 min    Assessment/Plan   PT Assessment  PT Assessment Results: Decreased endurance, Impaired balance, Decreased mobility  Rehab Prognosis: Good  Evaluation/Treatment Tolerance: Patient tolerated treatment well  Medical Staff Made Aware: Yes  Strengths: Ability to acquire knowledge, Attitude of self  End of Session Communication: Bedside nurse  Assessment Comment: Pt with good tolerance to upright activty and good adherence to RLE NWB. Pt was able to ambulate 150ft with and perform 4 steps with CGA. Patient would benefit from skilled physical therapy to maximize functional mobility and safety. Pt remains appropriate for home with outpatient PT when medically appropriate for DC.  End of Session Patient Position: Alarm off, not on at start of session (pt sititng EOB, CB in reach, family present)  IP OR SWING BED PT PLAN  Inpatient or Swing Bed: Inpatient  PT Plan  Treatment/Interventions: Bed mobility, Transfer training, Gait training, Stair training, Balance training, Strengthening, Endurance training, Therapeutic exercise, Therapeutic activity, Home exercise program  PT Plan: Ongoing PT  PT Frequency: 5 times per week  PT Discharge Recommendations: Low intensity level of continued care (outpatient PT)  Equipment Recommended upon Discharge: Crutches (shower chair)  PT Recommended Transfer Status: Assistive device, Contact guard (crutches)  PT - OK to Discharge: Yes (meaning pt seen and dc rec made)  RN cleared prior to session    Subjective   General Visit Information:  General  Reason for Referral: 39 y.o. female s/p R calcanectomy, placement of antibiotic spacer, complex wound closure  on 8/23 with Dr. Lee.  Past Medical History Relevant to Rehab: Acute osteomyelitis of right calcaneus, Anemia, Anxiety,  Arthritis, Asthma (HHS-HCC), Metastatic lung cancer, and PTSD  Family/Caregiver Present: Yes  Caregiver Feedback: family member present and supportive  Prior to Session Communication: Bedside nurse  Patient Position Received: Alarm off, not on at start of session (pt sitting EOB upon entry)  Preferred Learning Style: auditory, verbal  General Comment: Pt pleasant and agreeable to therapy  Home Living:  Home Living  Type of Home: House  Lives With: Significant other  Home Adaptive Equipment: None (Pt states she had a cane but it broke)  Home Layout: One level  Home Access: Stairs to enter with rails  Entrance Stairs-Rails: Right  Entrance Stairs-Number of Steps: 2+8  Bathroom Shower/Tub: Tub/shower unit  Bathroom Equipment: None  Prior Level of Function:  Prior Function Per Pt/Caregiver Report  Level of [place]: Independent with ADLs and functional transfers, Independent with homemaking with ambulation  ADL Assistance: Independent  Homemaking Assistance: Independent  Ambulatory Assistance: Independent  Prior Function Comments: Pt was IND in all ADLs and IADLs prior to admit and used no AD for ambulation. Pt endorses no recent falls  Precautions:  Precautions  LE Weight Bearing Status: Right Non-Weight Bearing (in splint)  Medical Precautions: Fall precautions    Objective   Pain:  Pain Assessment  Pain Assessment: 0-10  0-10 (Numeric) Pain Score: 0 - No pain  Cognition:  Cognition  Overall Cognitive Status: Within Functional Limits  Arousal/Alertness: Appropriate responses to stimuli  Orientation Level: Oriented X4  Following Commands: Follows one step commands consistently    General Assessments:  Activity Tolerance  Endurance: Endurance does not limit participation in activity    Sensation  Light Touch: No apparent deficits    Strength  Strength Comments: LLE WFL; RLE hip and knee >3+/5  Coordination  Movements are Fluid and Coordinated: Yes    Postural Control  Postural Control: Within Functional  Limits    Static Sitting Balance  Static Sitting-Balance Support: Feet supported, No upper extremity supported  Static Sitting-Level of Assistance: Independent    Static Standing Balance  Static Standing-Balance Support: Bilateral upper extremity supported  Static Standing-Level of Assistance: Close supervision  Static Standing-Comment/Number of Minutes: crutches  Functional Assessments:  Bed Mobility  Bed Mobility: No (pt sititng EOB pre and post)    Transfers  Transfer: Yes  Transfer 1  Transfer From 1: Sit to, Stand to  Transfer to 1: Stand, Sit  Technique 1: Sit to stand, Stand to sit  Transfer Device 1: Crutches  Transfer Level of Assistance 1: Close supervision  Trials/Comments 1: 2 trials; min vc for safety and NWB RLE    Ambulation/Gait Training  Ambulation/Gait Training Performed: Yes  Ambulation/Gait Training 1  Surface 1: Level tile  Device 1: Axillary crutches  Assistance 1: Contact guard  Quality of Gait 1: Diminished heel strike (decreased foot clearance)  Comments/Distance (ft) 1: 150ft x 2; min vc for safety; Pt with good adherence to NWB RLE    Stairs  Stairs: Yes  Stairs  Rails 1: Right  Device 1: Railing, Single crutch  Assistance 1: Contact guard  Comment/Number of Steps 1: 4; R rail with L crutch; min vc for sequencing and safety  Extremity/Trunk Assessments:  RLE   RLE :  (RLE hip and knee >3+/5; ankle not tested)  LLE   LLE : Within Functional Limits  Outcome Measures:  [facility] Basic Mobility  Turning from your back to your side while in a flat bed without using bedrails: None  Moving from lying on your back to sitting on the side of a flat bed without using bedrails: None  Moving to and from bed to chair (including a wheelchair): None  Standing up from a chair using your arms (e.g. wheelchair or bedside chair): None  To walk in hospital room: A little  Climbing 3-5 steps with railing: A little  Basic Mobility - Total Score: 22    Encounter Problems       Encounter Problems (Active)       PT  Problem       Patient will ambulate >250' with Crutches and Modified Independent        Start:  08/24/24    Expected End:  09/06/24            Patient will perform sit<>stand transfer with Crutches, and Modified Independent        Start:  08/24/24    Expected End:  09/06/24            Patient will complete supine to sit and sit to supine Independent        Start:  08/24/24    Expected End:  09/06/24            Patient will ascend/descend 10 steps with Right Rail Ascending and  L crutch and MOD I        Start:  08/24/24    Expected End:  09/06/24               Pain - Adult              Education Documentation  Precautions, taught by Lynn Pineda, PT at 8/24/2024 11:29 AM.  Learner: Patient  Readiness: Acceptance  Method: Explanation, Demonstration  Response: Verbalizes Understanding, Needs Reinforcement    Body Mechanics, taught by Lynn Pineda PT at 8/24/2024 11:29 AM.  Learner: Patient  Readiness: Acceptance  Method: Explanation, Demonstration  Response: Verbalizes Understanding, Needs Reinforcement    Mobility Training, taught by Lynn Pineda PT at 8/24/2024 11:29 AM.  Learner: Patient  Readiness: Acceptance  Method: Explanation, Demonstration  Response: Verbalizes Understanding, Needs Reinforcement    Education Comments  No comments found.

## 2024-08-24 NOTE — PROGRESS NOTES
Postop Pain HPI -   Palliative: relieved with IV analgesics and regional local anesthetics  Provocative: movement  Quality:  burning and aching  Radiation:  none  Severity:  3-4/10  Timing: constant    24-HOUR OPIOID CONSUMPTION:  Oxycodone 10mg x 2    Scheduled medications  acetaminophen, 650 mg, oral, q6h PHILLIP  aspirin, 81 mg, oral, BID  calcium carbonate-vitamin D3, 1 tablet, oral, BID  piperacillin-tazobactam, 3.375 g, intravenous, q6h  polyethylene glycol, 17 g, oral, Daily  sennosides-docusate sodium, 2 tablet, oral, BID  [START ON 8/25/2024] vancomycin, 1,250 mg, intravenous, q24h      Continuous medications  lactated Ringer's, 100 mL/hr, Last Rate: 100 mL/hr (08/24/24 0343)  oxygen, 2 L/min, Last Rate: Stopped (08/23/24 1415)      PRN medications  PRN medications: bisacodyl, cyclobenzaprine, diphenhydrAMINE, HYDROmorphone, magnesium hydroxide, naloxone, naloxone, naloxone, ondansetron **OR** ondansetron, oxyCODONE, oxyCODONE, prochlorperazine **OR** prochlorperazine **OR** prochlorperazine, vancomycin     Physical Exam:  Constitutional:  no distress, alert and cooperative  Eyes: clear sclera  Head/Neck: No apparent injury, trachea midline  Respiratory/Thorax: Patent airways, thorax symmetric, breathing comfortably  Cardiovascular: no pitting edema  Gastrointestinal: Nondistended  Musculoskeletal: ROM intact  Extremities: no clubbing  Neurological: alert, barbosa x4  Psychological: Appropriate affect    Results for orders placed or performed during the hospital encounter of 08/23/24 (from the past 24 hour(s))   Blood Culture    Specimen: Peripheral Venipuncture; Blood culture   Result Value Ref Range    Blood Culture Loaded on Instrument - Culture in progress    Blood Culture    Specimen: Peripheral Venipuncture; Blood culture   Result Value Ref Range    Blood Culture Loaded on Instrument - Culture in progress    CBC   Result Value Ref Range    WBC 20.3 (H) 4.4 - 11.3 x10*3/uL    nRBC 0.0 0.0 - 0.0 /100 WBCs     RBC 3.68 (L) 4.00 - 5.20 x10*6/uL    Hemoglobin 10.5 (L) 12.0 - 16.0 g/dL    Hematocrit 32.9 (L) 36.0 - 46.0 %    MCV 89 80 - 100 fL    MCH 28.5 26.0 - 34.0 pg    MCHC 31.9 (L) 32.0 - 36.0 g/dL    RDW 14.5 11.5 - 14.5 %    Platelets 394 150 - 450 x10*3/uL   Basic metabolic panel   Result Value Ref Range    Glucose 96 74 - 99 mg/dL    Sodium 137 136 - 145 mmol/L    Potassium 3.9 3.5 - 5.3 mmol/L    Chloride 101 98 - 107 mmol/L    Bicarbonate 28 21 - 32 mmol/L    Anion Gap 12 10 - 20 mmol/L    Urea Nitrogen 7 6 - 23 mg/dL    Creatinine 0.90 0.50 - 1.05 mg/dL    eGFR 83 >60 mL/min/1.73m*2    Calcium 8.3 (L) 8.6 - 10.6 mg/dL   Vancomycin   Result Value Ref Range    Vancomycin 33.7 (H) 5.0 - 20.0 ug/mL   Blood Culture    Specimen: Peripheral Venipuncture; Blood culture   Result Value Ref Range    Blood Culture Loaded on Instrument - Culture in progress       Janette Panda is a 39 y.o. year old female patient who presents for Right partial Calcanectomy / Insertion of antibiotic spacer, two-stage approach for reconstruction  with Dr. Lee. Acute Pain consulted for block for postoperative pain control.     Plan:     - R popliteal/saphenous single shot blocks performed preoperatively on 08/23/24   - Pain medications per primary team  - -Acute Pain Service will sign off at this time. Please let us know if any further questions or concerns.      Acute Pain Team  pg [phone] ph [phone].

## 2024-08-24 NOTE — PROGRESS NOTES
"Orthopaedic Surgery Progress Note    S:  Pain well controlled. Denies chest pain, shortness of breath, or fevers.    O:  BP 99/60   Pulse 68   Temp 36.6 °C (97.9 °F) (Temporal)   Resp 16   Ht 1.664 m (5' 5.5\")   Wt 70.8 kg (156 lb)   SpO2 94%   BMI 25.56 kg/m²     Gen: arousable, NAD, appropriately conversational  Cardiac: extremities warm  Resp: nonlabored on RA  GI: soft, nondistended    MSK:  Right Lower Extremity:   -Splint c/d/i  -wiggles toes without pain  -SILT to toes  -Toes warm, well perfused  -Brisk cap refill    A/P: 39 y.o. female s/p R calcanectomy, placement of antibiotic spacer, complex wound closure on 8/23 with Dr. Lee.      Plan:  - Weight bearing: NWB RLE in splint  - DVT ppx: SCDs, ASA 81 bid  - ID consult  - Intra-op CX pending  - Intra-op Path pending  - blood cx ordered in anticipation of PICC  - Diet: Regular  - Pain: Tylenol, oxycodone 5/10  - Antibiotics: Vanc/Zosyn, pending ID recs  - FEN: HLIV with good PO intake  - Bowel Regimen: Colace, senna, dulcolax  - PT/OT  - Pulm: Encourage IS  - Continue home medications  - No osorio    Dispo: Pending ID, cultures, path, PT, and likely PICC    Gerry Rice MD  Orthopaedic Surgery PGY-1  Resident On-Call  Pager: [phone]  Available via Epic Chat    While inpatient, this patient will be followed by the Orthopaedic Trauma team. Please see contact information below:      First call: Clayton Kiser, PGY-1, Gerry Rice, PGY-1  Second call: Arvind Winter, PGY-2   Third call: Colt Spencer, PGY-3    6pm-6am M-F, Holidays, and weekends page Ortho on-call @[phone] with urgent questions/concerns.          "

## 2024-08-24 NOTE — CARE PLAN
Problem: Pain - Adult  Goal: Verbalizes/displays adequate comfort level or baseline comfort level  Outcome: Progressing     Problem: Safety - Adult  Goal: Free from fall injury  Outcome: Progressing     Problem: Discharge Planning  Goal: Discharge to home or other facility with appropriate resources  Outcome: Progressing     Problem: Chronic Conditions and Co-morbidities  Goal: Patient's chronic conditions and co-morbidity symptoms are monitored and maintained or improved  Outcome: Progressing     Problem: Fall/Injury  Goal: Not fall by end of shift  Outcome: Progressing  Goal: Be free from injury by end of the shift  Outcome: Progressing  Goal: Verbalize understanding of personal risk factors for fall in the hospital  Outcome: Progressing  Goal: Verbalize understanding of risk factor reduction measures to prevent injury from fall in the home  Outcome: Progressing  Goal: Use assistive devices by end of the shift  Outcome: Progressing  Goal: Pace activities to prevent fatigue by end of the shift  Outcome: Progressing       The clinical goals for the shift include free from falls and injuries

## 2024-08-24 NOTE — CARE PLAN
The clinical goals for the shift include pt will remain safe and utilize call light.   Problem: Pain - Adult  Goal: Verbalizes/displays adequate comfort level or baseline comfort level  Outcome: Progressing     Problem: Safety - Adult  Goal: Free from fall injury  Outcome: Progressing     Problem: Discharge Planning  Goal: Discharge to home or other facility with appropriate resources  Outcome: Progressing     Problem: Chronic Conditions and Co-morbidities  Goal: Patient's chronic conditions and co-morbidity symptoms are monitored and maintained or improved  Outcome: Progressing     Problem: Fall/Injury  Goal: Not fall by end of shift  Outcome: Progressing  Goal: Be free from injury by end of the shift  Outcome: Progressing  Goal: Verbalize understanding of personal risk factors for fall in the hospital  Outcome: Progressing  Goal: Verbalize understanding of risk factor reduction measures to prevent injury from fall in the home  Outcome: Progressing  Goal: Use assistive devices by end of the shift  Outcome: Progressing  Goal: Pace activities to prevent fatigue by end of the shift  Outcome: Progressing

## 2024-08-24 NOTE — PROGRESS NOTES
Vancomycin Dosing by Pharmacy- FOLLOW UP    Janette Panda is a 39 y.o. year old female who Pharmacy has been consulted for vancomycin dosing for osteomyelitis/septic arthritis. Based on the patient's indication and renal status this patient is being dosed based on a goal AUC of 500-600.     Renal function is currently stable.    Current vancomycin dose: 1250 mg given every 12 hours    Estimated vancomycin AUC on current dose: 1007 mg/L.hr     Visit Vitals  BP 97/61 (BP Location: Left arm, Patient Position: Lying)   Pulse 63   Temp 36.5 °C (97.7 °F) (Temporal)   Resp 16        Lab Results   Component Value Date    CREATININE 0.90 2024    CREATININE 0.82 2024    CREATININE 0.90 2024    CREATININE 0.77 01/10/2024        Patient weight is as follows:   Vitals:    24 1416   Weight: 70.8 kg (156 lb)       Cultures:  No results found for the encounter in last 14 days.       I/O last 3 completed shifts:  In: 350 (4.9 mL/kg) [IV Piggyback:350]  Out: 925 (13.1 mL/kg) [Urine:900 (0.4 mL/kg/hr); Blood:25]  Weight: 70.8 kg   I/O during current shift:  I/O this shift:  In: 50 [IV Piggyback:50]  Out: -     Temp (24hrs), Av.3 °C (97.3 °F), Min:35.9 °C (96.6 °F), Max:36.6 °C (97.9 °F)      Assessment/Plan    Above goal AUC. Orders placed for new vancomcyin regimen of 1250 every 24 hours to begin at 0300 on . Culture in progress  am.    This dosing regimen is predicted by [employer] to result in the following pharmacokinetic parameters:  <<<<<PASTE [employer] DATA HERE>>>>>  Loading dose: N/A  Regimen: 1250 mg IV every 24 hours.  Start time: 03:42 on 2024  Exposure target: AUC24 (range)400-600 mg/L.hr   AUC24,ss: 506 mg/L.hr  Probability of AUC24 > 400: 86 %  Ctrough,ss: 9.1 mg/L  Probability of Ctrough,ss > 20: 3 %  Probability of nephrotoxicity (Lodise JEFF ): 5 %    The next level will be obtained on  at 1000. May be obtained sooner if clinically indicated.   Will continue to  monitor renal function daily while on vancomycin and order serum creatinine at least every 48 hours if not already ordered.  Follow for continued vancomycin needs, clinical response, and signs/symptoms of toxicity.       Anila Esquivel, PharmD

## 2024-08-25 ENCOUNTER — APPOINTMENT (OUTPATIENT)
Dept: RADIOLOGY | Facility: HOSPITAL | Age: 39
DRG: 857 | End: 2024-08-25
Payer: MEDICARE

## 2024-08-25 VITALS
SYSTOLIC BLOOD PRESSURE: 107 MMHG | HEIGHT: 66 IN | WEIGHT: 156 LBS | TEMPERATURE: 97.9 F | HEART RATE: 67 BPM | BODY MASS INDEX: 25.07 KG/M2 | DIASTOLIC BLOOD PRESSURE: 69 MMHG | OXYGEN SATURATION: 98 % | RESPIRATION RATE: 17 BRPM

## 2024-08-25 LAB
B-LACTAMASE ORGANISM ISLT: NEGATIVE
BACTERIA BLD CULT: NORMAL
BACTERIA SPEC CULT: ABNORMAL
GRAM STN SPEC: ABNORMAL

## 2024-08-25 PROCEDURE — 2500000001 HC RX 250 WO HCPCS SELF ADMINISTERED DRUGS (ALT 637 FOR MEDICARE OP)

## 2024-08-25 PROCEDURE — 2500000004 HC RX 250 GENERAL PHARMACY W/ HCPCS (ALT 636 FOR OP/ED)

## 2024-08-25 PROCEDURE — 87081 CULTURE SCREEN ONLY: CPT

## 2024-08-25 PROCEDURE — 2780000003 HC OR 278 NO HCPCS

## 2024-08-25 PROCEDURE — 1100000001 HC PRIVATE ROOM DAILY

## 2024-08-25 PROCEDURE — C1751 CATH, INF, PER/CENT/MIDLINE: HCPCS

## 2024-08-25 PROCEDURE — 36569 INSJ PICC 5 YR+ W/O IMAGING: CPT

## 2024-08-25 RX ORDER — GABAPENTIN 300 MG/1
300 CAPSULE ORAL EVERY 8 HOURS SCHEDULED
Status: DISCONTINUED | OUTPATIENT
Start: 2024-08-25 | End: 2024-08-28 | Stop reason: HOSPADM

## 2024-08-25 RX ORDER — LIDOCAINE HYDROCHLORIDE 10 MG/ML
5 INJECTION INFILTRATION; PERINEURAL ONCE
Status: DISCONTINUED | OUTPATIENT
Start: 2024-08-25 | End: 2024-08-28 | Stop reason: HOSPADM

## 2024-08-25 RX ADMIN — CYCLOBENZAPRINE 10 MG: 10 TABLET, FILM COATED ORAL at 12:20

## 2024-08-25 RX ADMIN — ASPIRIN 81 MG: 81 TABLET, COATED ORAL at 20:19

## 2024-08-25 RX ADMIN — PIPERACILLIN SODIUM AND TAZOBACTAM SODIUM 3.38 G: 3; .375 INJECTION, SOLUTION INTRAVENOUS at 06:20

## 2024-08-25 RX ADMIN — OXYCODONE HYDROCHLORIDE 10 MG: 5 TABLET ORAL at 04:34

## 2024-08-25 RX ADMIN — OXYCODONE HYDROCHLORIDE 10 MG: 5 TABLET ORAL at 17:53

## 2024-08-25 RX ADMIN — VANCOMYCIN HYDROCHLORIDE 1250 MG: 1.25 INJECTION, POWDER, LYOPHILIZED, FOR SOLUTION INTRAVENOUS at 03:00

## 2024-08-25 RX ADMIN — GABAPENTIN 300 MG: 300 CAPSULE ORAL at 22:00

## 2024-08-25 RX ADMIN — ASPIRIN 81 MG: 81 TABLET, COATED ORAL at 09:50

## 2024-08-25 RX ADMIN — SENNOSIDES AND DOCUSATE SODIUM 2 TABLET: 50; 8.6 TABLET ORAL at 20:19

## 2024-08-25 RX ADMIN — Medication 1 TABLET: at 09:49

## 2024-08-25 RX ADMIN — ACETAMINOPHEN 650 MG: 325 TABLET ORAL at 12:20

## 2024-08-25 RX ADMIN — Medication 1 TABLET: at 20:19

## 2024-08-25 RX ADMIN — DIPHENHYDRAMINE HYDROCHLORIDE 12.5 MG: 50 INJECTION INTRAMUSCULAR; INTRAVENOUS at 03:21

## 2024-08-25 RX ADMIN — PIPERACILLIN SODIUM AND TAZOBACTAM SODIUM 3.38 G: 3; .375 INJECTION, SOLUTION INTRAVENOUS at 12:20

## 2024-08-25 RX ADMIN — GABAPENTIN 300 MG: 300 CAPSULE ORAL at 14:00

## 2024-08-25 RX ADMIN — ACETAMINOPHEN 650 MG: 325 TABLET ORAL at 17:48

## 2024-08-25 RX ADMIN — ACETAMINOPHEN 650 MG: 325 TABLET ORAL at 23:52

## 2024-08-25 RX ADMIN — ACETAMINOPHEN 650 MG: 325 TABLET ORAL at 06:20

## 2024-08-25 RX ADMIN — HYDROMORPHONE HYDROCHLORIDE 0.5 MG: 1 INJECTION, SOLUTION INTRAMUSCULAR; INTRAVENOUS; SUBCUTANEOUS at 03:21

## 2024-08-25 RX ADMIN — PIPERACILLIN SODIUM AND TAZOBACTAM SODIUM 3.38 G: 3; .375 INJECTION, SOLUTION INTRAVENOUS at 17:48

## 2024-08-25 RX ADMIN — OXYCODONE HYDROCHLORIDE 10 MG: 5 TABLET ORAL at 09:49

## 2024-08-25 ASSESSMENT — COGNITIVE AND FUNCTIONAL STATUS - GENERAL
WALKING IN HOSPITAL ROOM: A LITTLE
DAILY ACTIVITIY SCORE: 24
DAILY ACTIVITIY SCORE: 24
CLIMB 3 TO 5 STEPS WITH RAILING: A LITTLE
MOBILITY SCORE: 22
CLIMB 3 TO 5 STEPS WITH RAILING: A LITTLE
WALKING IN HOSPITAL ROOM: A LITTLE
MOBILITY SCORE: 22

## 2024-08-25 ASSESSMENT — PAIN - FUNCTIONAL ASSESSMENT
PAIN_FUNCTIONAL_ASSESSMENT: 0-10

## 2024-08-25 ASSESSMENT — PAIN SCALES - GENERAL
PAINLEVEL_OUTOF10: 9
PAINLEVEL_OUTOF10: 7
PAINLEVEL_OUTOF10: 7
PAINLEVEL_OUTOF10: 6
PAINLEVEL_OUTOF10: 8
PAINLEVEL_OUTOF10: 7
PAINLEVEL_OUTOF10: 8
PAINLEVEL_OUTOF10: 4
PAINLEVEL_OUTOF10: 6
PAINLEVEL_OUTOF10: 7

## 2024-08-25 ASSESSMENT — PAIN DESCRIPTION - DESCRIPTORS
DESCRIPTORS: BURNING

## 2024-08-25 NOTE — NURSING NOTE
Pt was attempting to leave the unit and she does not have an order for off the floor privileges. RN informed the pt that she is not permitted to leave the unit, but she stated she is going to leave anyway. Notified the primary team that the pt has left the unit. Porter Durbin MD stated that someone from the primary team will come speak with the pt and reiterate her restrictions.     0145: Porter Durbin MD was at pts bedside. Reinforced pts restrictions. RN reeducated the pt regarding leaving the floor without an order stating she is permitted to do so. PT verbalized an understanding. Will continue to monitor and notify the primary team of any further issues or concerns.

## 2024-08-25 NOTE — PROGRESS NOTES
"Orthopaedic Surgery Progress Note    S:  Pain well controlled. Denies chest pain, shortness of breath, or fevers. Tissue culture with GPCs on GS.    O:  /76 (BP Location: Left arm, Patient Position: Lying)   Pulse 59   Temp 36.4 °C (97.5 °F) (Temporal)   Resp 18   Ht 1.664 m (5' 5.5\")   Wt 70.8 kg (156 lb)   SpO2 99%   BMI 25.56 kg/m²     Gen: arousable, NAD, appropriately conversational  Cardiac: extremities warm  Resp: nonlabored on RA    MSK:  Right Lower Extremity:   -SLS c/d/i  -wiggles toes without pain  -SILT to toes  -Toes warm, well perfused  -Brisk cap refill    A/P: 39 y.o. female s/p R calcanectomy, placement of antibiotic spacer, complex wound closure on 8/23 with Dr. Lee.      Plan:  - Weight bearing: NWB RLE in splint  - DVT ppx: SCDs, ASA 81 bid  - ID consult  - Intra-op CX pending  - Intra-op Path pending  - PICC pending BCx  - Diet: Regular  - Pain: Tylenol, oxycodone 5/10  - Antibiotics: Vanc/Zosyn, pending ID final recs  - FEN: HLIV with good PO intake  - Bowel Regimen: Colace, senna, dulcolax  - Pulm: Encourage IS  - Continue home medications  - PT rec outpatient PT    Dispo: Pending ID, cultures, path, and likely PICC    Gerry Rice MD  Orthopaedic Surgery PGY-1  Resident On-Call  Pager: [phone]  Available via Epic Chat    While inpatient, this patient will be followed by the Orthopaedic Trauma team. Please see contact information below:      First call: Clayton Kiser, PGY-1, Gerry Rice, PGY-1  Second call: Arvind Winter PGY-2   Third call: Colt Spencer, PGY-3    6pm-6am M-F, Holidays, and weekends page Ortho on-call @[phone] with urgent questions/concerns.          "

## 2024-08-26 ENCOUNTER — HOME HEALTH ADMISSION (OUTPATIENT)
Dept: HOME HEALTH SERVICES | Facility: HOME HEALTH | Age: 39
End: 2024-08-26
Payer: MEDICARE

## 2024-08-26 LAB
ALBUMIN SERPL BCP-MCNC: 3.7 G/DL (ref 3.4–5)
ANION GAP SERPL CALC-SCNC: 13 MMOL/L (ref 10–20)
BUN SERPL-MCNC: 3 MG/DL (ref 6–23)
CALCIUM SERPL-MCNC: 9.1 MG/DL (ref 8.6–10.6)
CHLORIDE SERPL-SCNC: 102 MMOL/L (ref 98–107)
CO2 SERPL-SCNC: 27 MMOL/L (ref 21–32)
CREAT SERPL-MCNC: 0.76 MG/DL (ref 0.5–1.05)
EGFRCR SERPLBLD CKD-EPI 2021: >90 ML/MIN/1.73M*2
GLUCOSE SERPL-MCNC: 61 MG/DL (ref 74–99)
PHOSPHATE SERPL-MCNC: 3 MG/DL (ref 2.5–4.9)
POTASSIUM SERPL-SCNC: 3.8 MMOL/L (ref 3.5–5.3)
SODIUM SERPL-SCNC: 138 MMOL/L (ref 136–145)
STAPHYLOCOCCUS SPEC CULT: NORMAL
VANCOMYCIN SERPL-MCNC: 4.1 UG/ML (ref 5–20)

## 2024-08-26 PROCEDURE — 80202 ASSAY OF VANCOMYCIN: CPT

## 2024-08-26 PROCEDURE — 2500000004 HC RX 250 GENERAL PHARMACY W/ HCPCS (ALT 636 FOR OP/ED): Mod: JZ

## 2024-08-26 PROCEDURE — 80069 RENAL FUNCTION PANEL: CPT

## 2024-08-26 PROCEDURE — 2500000001 HC RX 250 WO HCPCS SELF ADMINISTERED DRUGS (ALT 637 FOR MEDICARE OP)

## 2024-08-26 PROCEDURE — 97116 GAIT TRAINING THERAPY: CPT | Mod: GP | Performed by: PHYSICAL THERAPIST

## 2024-08-26 PROCEDURE — 02HV33Z INSERTION OF INFUSION DEVICE INTO SUPERIOR VENA CAVA, PERCUTANEOUS APPROACH: ICD-10-PCS | Performed by: ORTHOPAEDIC SURGERY

## 2024-08-26 PROCEDURE — 36415 COLL VENOUS BLD VENIPUNCTURE: CPT

## 2024-08-26 PROCEDURE — 1100000001 HC PRIVATE ROOM DAILY

## 2024-08-26 PROCEDURE — 2500000005 HC RX 250 GENERAL PHARMACY W/O HCPCS

## 2024-08-26 PROCEDURE — 2500000004 HC RX 250 GENERAL PHARMACY W/ HCPCS (ALT 636 FOR OP/ED)

## 2024-08-26 PROCEDURE — 97165 OT EVAL LOW COMPLEX 30 MIN: CPT | Mod: GO

## 2024-08-26 RX ADMIN — ACETAMINOPHEN 650 MG: 325 TABLET ORAL at 13:04

## 2024-08-26 RX ADMIN — ACETAMINOPHEN 650 MG: 325 TABLET ORAL at 06:18

## 2024-08-26 RX ADMIN — CYCLOBENZAPRINE 10 MG: 10 TABLET, FILM COATED ORAL at 13:04

## 2024-08-26 RX ADMIN — ASPIRIN 81 MG: 81 TABLET, COATED ORAL at 09:09

## 2024-08-26 RX ADMIN — PIPERACILLIN SODIUM AND TAZOBACTAM SODIUM 3.38 G: 3; .375 INJECTION, SOLUTION INTRAVENOUS at 13:04

## 2024-08-26 RX ADMIN — Medication 1 TABLET: at 20:52

## 2024-08-26 RX ADMIN — ACETAMINOPHEN 650 MG: 325 TABLET ORAL at 17:32

## 2024-08-26 RX ADMIN — PIPERACILLIN SODIUM AND TAZOBACTAM SODIUM 3.38 G: 3; .375 INJECTION, SOLUTION INTRAVENOUS at 17:32

## 2024-08-26 RX ADMIN — VANCOMYCIN HYDROCHLORIDE 1250 MG: 1.25 INJECTION, POWDER, LYOPHILIZED, FOR SOLUTION INTRAVENOUS at 14:42

## 2024-08-26 RX ADMIN — OXYCODONE HYDROCHLORIDE 10 MG: 5 TABLET ORAL at 09:09

## 2024-08-26 RX ADMIN — OXYCODONE HYDROCHLORIDE 10 MG: 5 TABLET ORAL at 13:04

## 2024-08-26 RX ADMIN — GABAPENTIN 300 MG: 300 CAPSULE ORAL at 06:18

## 2024-08-26 RX ADMIN — Medication 1 TABLET: at 09:09

## 2024-08-26 RX ADMIN — GABAPENTIN 300 MG: 300 CAPSULE ORAL at 22:05

## 2024-08-26 RX ADMIN — GABAPENTIN 300 MG: 300 CAPSULE ORAL at 13:04

## 2024-08-26 RX ADMIN — CYCLOBENZAPRINE 10 MG: 10 TABLET, FILM COATED ORAL at 02:54

## 2024-08-26 RX ADMIN — OXYCODONE HYDROCHLORIDE 10 MG: 5 TABLET ORAL at 17:32

## 2024-08-26 RX ADMIN — ASPIRIN 81 MG: 81 TABLET, COATED ORAL at 20:52

## 2024-08-26 RX ADMIN — OXYCODONE HYDROCHLORIDE 10 MG: 5 TABLET ORAL at 02:54

## 2024-08-26 ASSESSMENT — COGNITIVE AND FUNCTIONAL STATUS - GENERAL
MOVING TO AND FROM BED TO CHAIR: A LITTLE
CLIMB 3 TO 5 STEPS WITH RAILING: A LITTLE
CLIMB 3 TO 5 STEPS WITH RAILING: A LITTLE
MOBILITY SCORE: 20
STANDING UP FROM CHAIR USING ARMS: A LITTLE
MOBILITY SCORE: 20
CLIMB 3 TO 5 STEPS WITH RAILING: A LITTLE
HELP NEEDED FOR BATHING: A LITTLE
DAILY ACTIVITIY SCORE: 24
WALKING IN HOSPITAL ROOM: A LITTLE
MOBILITY SCORE: 22
STANDING UP FROM CHAIR USING ARMS: A LITTLE
MOVING TO AND FROM BED TO CHAIR: A LITTLE
DRESSING REGULAR LOWER BODY CLOTHING: A LITTLE
WALKING IN HOSPITAL ROOM: A LITTLE
DAILY ACTIVITIY SCORE: 22
DAILY ACTIVITIY SCORE: 24
WALKING IN HOSPITAL ROOM: A LITTLE

## 2024-08-26 ASSESSMENT — PAIN - FUNCTIONAL ASSESSMENT
PAIN_FUNCTIONAL_ASSESSMENT: 0-10

## 2024-08-26 ASSESSMENT — PAIN SCALES - GENERAL
PAINLEVEL_OUTOF10: 6
PAINLEVEL_OUTOF10: 5 - MODERATE PAIN
PAINLEVEL_OUTOF10: 0 - NO PAIN
PAINLEVEL_OUTOF10: 7
PAINLEVEL_OUTOF10: 4
PAINLEVEL_OUTOF10: 4
PAINLEVEL_OUTOF10: 8
PAINLEVEL_OUTOF10: 0 - NO PAIN
PAINLEVEL_OUTOF10: 7

## 2024-08-26 ASSESSMENT — ACTIVITIES OF DAILY LIVING (ADL)
ADL_ASSISTANCE: INDEPENDENT
BATHING_ASSISTANCE: STAND BY

## 2024-08-26 NOTE — PROGRESS NOTES
Physical Therapy    Physical Therapy Treatment    Patient Name: Janette Panda  MRN: [id number]  Today's Date: 8/26/2024  Time Calculation  Start Time: 1052  Stop Time: 1108  Time Calculation (min): 16 min    Assessment/Plan   PT Assessment  PT Assessment Results: Impaired balance, Decreased mobility, Orthopedic restrictions, Decreased skin integrity  Rehab Prognosis: Excellent  Barriers to Discharge: awaiting PICC line  Evaluation/Treatment Tolerance: Patient tolerated treatment well  Medical Staff Made Aware: Yes  Strengths: Ability to acquire knowledge, Attitude of self, Coping skills, Support of Caregivers, Premorbid level of function  Barriers to Participation: Other (Comment) (none)  End of Session Communication: Bedside nurse, Care Coordinator, PCT/NA/CTA  Assessment Comment: 38 yo fmeal s/p cancanectomy & placement of antibiotic spacer with complex wound closure presents NWB Right LE walking/doing stairs well with crutches. Once medically stable, recommend DC Home with intermittent assist, no further PT needs (TCC informed) with issue of equipment as noted.  End of Session Patient Position: Bed, 2 rail up, Alarm off, not on at start of session  PT Plan  Inpatient/Swing Bed or Outpatient: Inpatient  PT Plan  Treatment/Interventions: Transfer training, Gait training, Stair training, Balance training, Neuromuscular re-education, Endurance training, Therapeutic activity, Home exercise program, Postural re-education  PT Plan: Ongoing PT  PT Frequency: 3 times per week  PT Discharge Recommendations: No PT needed after discharge  Equipment Recommended upon Discharge: Other (comment) (axillary crutches and shower chair)  PT Recommended Transfer Status: Assist x1, Assistive device, Stand by assist (axillary crutches, NWB Right LE)  PT - OK to Discharge: Yes (PT eval completed & DC recs made)      General Visit Information:   PT  Visit  PT Received On: 08/26/24  Response to Previous Treatment: Patient with no  complaints from previous session.  General  Reason for Referral: 39 y.o. female s/p R calcanectomy, placement of antibiotic spacer, complex wound closure  on 8/23 with Dr. Lee.  Past Medical History Relevant to Rehab: Acute osteomyelitis of right calcaneus, Anemia, Anxiety, Arthritis, Asthma (HHS-HCC), Metastatic lung cancer, and PTSD  Missed Visit: No  Family/Caregiver Present: Yes  Caregiver Feedback: significant other present at end of session  Co-Treatment:  (N/A)  Prior to Session Communication: Bedside nurse  Patient Position Received: Bed, 2 rail up, Alarm off, not on at start of session  Preferred Learning Style: verbal  General Comment: Pt supine alert, per Rn has been walking to bathroom with crutches by herself. Pt alert and engaged, did well with NWB Right LE for gait and stairclimbing as noted. Pt has assist as needed upon DC and wants to wait until after follow-up with surgeon for PT.    Subjective   Precautions:  Precautions  LE Weight Bearing Status: Right Non-Weight Bearing  Medical Precautions: Fall precautions (pending PICC)  Vital Signs:  Vital Signs  Heart Rate:  (sitting post gait/stairs: O2  100%  HR 66)    Objective   Pain:  Pain Assessment  Pain Assessment: 0-10  0-10 (Numeric) Pain Score: 0 - No pain  Cognition:  Cognition  Overall Cognitive Status: Within Functional Limits  Coordination:     Postural Control:  Static Sitting Balance  Static Sitting-Balance Support: No upper extremity supported  Static Sitting-Level of Assistance: Modified independent  Dynamic Sitting Balance  Dynamic Sitting-Balance Support: Bilateral upper extremity supported  Dynamic Sitting-Level of Assistance: Distant supervision  Dynamic Sitting-Balance:  (scooting to EOB)  Static Standing Balance  Static Standing-Balance Support: Bilateral upper extremity supported (on axillary crutches)  Static Standing-Level of Assistance:  (SBA (steady, no LOB))  Dynamic Standing Balance  Dynamic Standing-Balance Support:  "Bilateral upper extremity supported (on bilateral axillary crutches)  Dynamic Standing-Level of Assistance:  (SBA, steady)  Dynamic Standing-Balance:  (gait including turns, stairclimbing as noted)    Activity Tolerance:  Activity Tolerance  Endurance: Endurance does not limit participation in activity  Treatments:  Bed Mobility  Bed Mobility: Yes  Bed Mobility 1  Bed Mobility 1: Supine to sitting, Sitting to supine  Level of Assistance 1: Modified independent  Bed Mobility Comments 1: HOB flat, no use of rail    Ambulation/Gait Training  Ambulation/Gait Training Performed: Yes  Ambulation/Gait Training 1  Surface 1: Level tile  Device 1: Axillary crutches  Assistance 1: Minimal verbal cues (SBA)  Quality of Gait 1:  (swing through, good safety awareness)  Comments/Distance (ft) 1: 200' (stairs in between)  Transfers  Transfer: Yes  Transfer 1  Transfer From 1: Sit to, Stand to  Transfer to 1: Stand, Sit  Technique 1: Stand to sit, Sit to stand  Transfer Device 1: Crutches  Transfer Level of Assistance 1: Minimal verbal cues (SBA (showed pt 2 different ways to stand up with crutches))    Stairs  Stairs: Yes (up/down 4 x 4\" steps with 1 rail/1 crutch NWB Right LE with SBA, no LOB (did twice to simulate her home stairs))    Outcome Measures:  [facility] Basic Mobility  Turning from your back to your side while in a flat bed without using bedrails: None  Moving from lying on your back to sitting on the side of a flat bed without using bedrails: None  Moving to and from bed to chair (including a wheelchair): A little  Standing up from a chair using your arms (e.g. wheelchair or bedside chair): A little  To walk in hospital room: A little  Climbing 3-5 steps with railing: A little  Basic Mobility - Total Score: 20    Education Documentation  Precautions, taught by Evelyn Shah, PT at 8/26/2024 11:39 AM.  Learner: Significant Other, Patient  Readiness: Acceptance  Method: Explanation, Demonstration, Teach-back  Response: " Demonstrated Understanding, Verbalizes Understanding  Comment: NWB Right LE, fit of crutches, safe gait & stairclimbing with axillary crutches, DC needs    Body Mechanics, taught by Evelyn Shah PT at 8/26/2024 11:39 AM.  Learner: Significant Other, Patient  Readiness: Acceptance  Method: Explanation, Demonstration, Teach-back  Response: Demonstrated Understanding, Verbalizes Understanding  Comment: NWB Right LE, fit of crutches, safe gait & stairclimbing with axillary crutches, DC needs    Mobility Training, taught by Evelyn Shah PT at 8/26/2024 11:39 AM.  Learner: Significant Other, Patient  Readiness: Acceptance  Method: Explanation, Demonstration, Teach-back  Response: Demonstrated Understanding, Verbalizes Understanding  Comment: NWB Right LE, fit of crutches, safe gait & stairclimbing with axillary crutches, DC needs    Education Comments  No comments found.          Encounter Problems       Encounter Problems (Active)       PT Problem       ambulate >500' NWB Right LE with Crutches Modified Independent  (Progressing)       Start:  08/24/24    Expected End:  09/09/24            sit to/from stand, bed to/from chair NWB Right LE with crutches modified independent (Progressing)       Start:  08/24/24    Expected End:  09/09/24            supine to sit and sit to supine Independent (HOB flat, no rail) (Progressing)       Start:  08/24/24    Expected End:  09/09/24            up/down 10 steps with 1 rail and 1 crutch NWB Right LE modified independent (Progressing)       Start:  08/24/24    Expected End:  09/09/24               Pain - Adult

## 2024-08-26 NOTE — PROGRESS NOTES
Vancomycin Dosing by Pharmacy- Cessation of Therapy    Consult to pharmacy for vancomycin dosing has been discontinued by the prescriber, pharmacy will sign off at this time.    Please call pharmacy if there are further questions or re-enter a consult if vancomycin is resumed.     Ching Gordon, ZairaD

## 2024-08-26 NOTE — CARE PLAN
Problem: Pain - Adult  Goal: Verbalizes/displays adequate comfort level or baseline comfort level  Outcome: Progressing     Problem: Safety - Adult  Goal: Free from fall injury  Outcome: Progressing     Problem: Discharge Planning  Goal: Discharge to home or other facility with appropriate resources  Outcome: Progressing     Problem: Chronic Conditions and Co-morbidities  Goal: Patient's chronic conditions and co-morbidity symptoms are monitored and maintained or improved  Outcome: Progressing     Problem: Fall/Injury  Goal: Not fall by end of shift  Outcome: Progressing  Goal: Be free from injury by end of the shift  Outcome: Progressing  Goal: Verbalize understanding of personal risk factors for fall in the hospital  Outcome: Progressing  Goal: Verbalize understanding of risk factor reduction measures to prevent injury from fall in the home  Outcome: Progressing  Goal: Use assistive devices by end of the shift  Outcome: Progressing  Goal: Pace activities to prevent fatigue by end of the shift  Outcome: Progressing       The clinical goals for the shift include free from falls and injuries       168

## 2024-08-26 NOTE — PROGRESS NOTES
Vancomycin Dosing by Pharmacy- FOLLOW UP    Janette Panda is a 39 y.o. year old female who Pharmacy has been consulted for vancomycin dosing for osteomyelitis/septic arthritis. Based on the patient's indication and renal status this patient is being dosed based on a goal AUC of 400-600.     Renal function is currently stable.    Current vancomycin dose: 1,250 mg given every 24 hours    Estimated vancomycin AUC on current dose: 399 mg/L.hr     Visit Vitals  /69   Pulse 60   Temp 36.5 °C (97.7 °F) (Tympanic)   Resp 18        Lab Results   Component Value Date    CREATININE 0.76 2024    CREATININE 0.90 2024    CREATININE 0.82 2024    CREATININE 0.90 2024        Patient weight is as follows:   Vitals:    24 1416   Weight: 70.8 kg (156 lb)       Cultures:  Susceptibility data for the encounter in last 14 days.  Collected Specimen Info Organism   24 Swab from ABSCESS Mixed Aerobic and Anaerobic Bacteria    24 Swab from ABSCESS Mixed Aerobic and Anaerobic Bacteria    24 Tissue from ABSCESS Mixed Aerobic and Anaerobic Bacteria         I/O last 3 completed shifts:  In: 50 (0.7 mL/kg) [IV Piggyback:50]  Out: - (0 mL/kg)   Weight: 70.8 kg   I/O during current shift:  I/O this shift:  In: 300 [IV Piggyback:300]  Out: -     Temp (24hrs), Av.5 °C (97.7 °F), Min:36.5 °C (97.7 °F), Max:36.5 °C (97.7 °F)      Assessment/Plan    Below goal AUC. Orders placed for new vancomcyin regimen of 1,500 mg every 24 hours to begin at 1500 on .     This dosing regimen is predicted by [employer] to result in the following pharmacokinetic parameters:    Loading dose: N/A  Regimen: 1500 mg IV every 24 hours.  Start time: 02:42 on 2024  Exposure target: AUC24 (range)400-600 mg/L.hr   AUC24,ss: 479 mg/L.hr  Probability of AUC24 > 400: 92 %  Ctrough,ss: 8.2 mg/L  Probability of Ctrough,ss > 20: 0 %  Probability of nephrotoxicity (Lodise JEFF ): 5 %    The next level will be  obtained on 08/28 at 0500. May be obtained sooner if clinically indicated.   Will continue to monitor renal function daily while on vancomycin and order serum creatinine at least every 48 hours if not already ordered.  Follow for continued vancomycin needs, clinical response, and signs/symptoms of toxicity.       Ching Gordon, PharmD

## 2024-08-26 NOTE — POST-PROCEDURE NOTE
Pre-Procedure Checklist:  Emergent Line Insertion: No  Type of Line to be Placed: PICC  Consent Obtained: Yes  Emergency Medication Necessary: No  Patient Identified with 2 Independent Identifiers: Yes  Review of Allergies, Anticoagulation, Relevant Labs, ECG/Telemetry: Yes  Risks/Benefits/Alternatives Discussed with Patient/POA/Legal Representative: Yes  Stop Sign on Door: Yes  Time Out Performed: Yes  Catheter Exchange: No    Positioning Checklist:  All People, Including Patient, in the Room with Cap and Mask: Yes  Fluoroscopy Used to Identify Vessel and Guide Insertion: No   Sterile Cover Used: Yes  Full Barrier Precautions Followed (Mask, Cap, Gown, Gloves): Yes  Hands Washed: Yes  Monitors Attached with Sound Alarms On: No  Full Body Sterile Drape (Head-to-Toe) Used to Cover Patient: Yes  Trendelenburg Position (For IJ and Subclavian): No  CHG Skin Prep Used and Allowed to Air Dry to Skin Procedure: Yes    Procedure Checklist:  Blood Aspirated From All Lumens, All Ports Subsequently Flushed: Yes  Catheter Caps Placed on All Lumens; Lumens Clamped: Yes  Maintain Guidewire Control Throughout, Ensuring Guidewire Removal: Yes  Maintain Sterile Field Throughout Insertion: Yes  Catheter Secured: Yes  Confirmatory Test of Venous Placement: Non-Pulsatile Blood    Post Procedure Checklist:  Date and Time Written on Dressing: Yes  Sharp and Wire Count and Safe Disposal of all Sharps/Wires: Yes  Sterile Dressing Applied Per Protocol: Yes  X-ray Ordered or ECG Image: Yes    PICC Insertion Details:  Size (Fr): 4  Lumen Type:Single  Catheter to Vein Ratio Less Than 50%: Yes  Total Length (cm): 39  External Length (cm): 0  Orientation: right  Location: brachial  Site Prep: Chlorohexidine; Usual sterile procedure followed  Local Anesthetic: Injectable/Subcutaneous  Indication:  Insertion Team Members in the Room: Nurse, LPN  Initial Extremity Circumference (cm): 29  Insertion Attempts: 1  Patient Tolerance: Tolerated Well, Age  Appropriate  Comfort Measures: Subcutaneous anesthetic; Verbal  Procedure Location: Bedside  Safety Measures: Patient specific safety measures addressed with RN  Estimated Blood Loss (mL): 0  Vessel Fully Compressible Proximally and Distally to Insertion Site: Yes  Brisk Blood Return Obtained and Line Draws Easily: Yes  Tip Location:SVC  Line Confirmation: ECG  Lot #:[id number]  : Bard  PICC Line Exp Date:7/31/2025  Securement: Stat Lock  Post Procedure Checklist: Handoff with RN; Obtain all new IV tubing prior to use; Bed at lowest level and wheels locked; Line discharge information at bedside.  Additional Details: Line was inserted using Modified Seldinger's Technique.   Placed by: Roxann Lynn RN-[facility]

## 2024-08-26 NOTE — PROGRESS NOTES
"Orthopaedic Surgery Progress Note    S:  Pain well controlled. Denies chest pain, shortness of breath, or fevers. Tissue culture with mixed skin rachana. Asking if she can go to cafeteria.    O:  /69 (BP Location: Left arm, Patient Position: Lying)   Pulse 67   Temp 36.6 °C (97.9 °F) (Temporal)   Resp 17   Ht 1.664 m (5' 5.5\")   Wt 70.8 kg (156 lb)   SpO2 98%   BMI 25.56 kg/m²     Gen: arousable, NAD, appropriately conversational  Cardiac: extremities warm  Resp: nonlabored on RA    MSK:  Right Lower Extremity:   -SLS c/d/i  -wiggles toes without pain  -SILT to toes  -Toes warm, well perfused  -Brisk cap refill    A/P: 39 y.o. female s/p R calcanectomy, placement of antibiotic spacer, complex wound closure on 8/23 with Dr. Lee.      Plan:  - Weight bearing: NWB RLE in splint  - DVT ppx: SCDs, ASA 81 bid  - ID consult - vanc/zosyn  - Intra-op CX with mixed skin rachana  - Intra-op Path pending  - PICC today  - Diet: Regular  - Pain: Tylenol, oxycodone 5/10  - Antibiotics: vanc/zosyn  - FEN: HLIV with good PO intake  - Bowel Regimen: Colace, senna, dulcolax  - Pulm: Encourage IS  - Continue home medications  - PT rec outpatient PT    Dispo: pending final ID recs and PICC    Arvind Winter MD   Available via Epic Chat    While inpatient, this patient will be followed by the Orthopaedic Trauma team. Please see contact information below:      First call: Clayton Kiser, PGY-1, Gerry Rice, PGY-1  Second call: Arvind Winter, PGY-2   Third call: Colt Spencer, PGY-3    6pm-6am M-F, Holidays, and weekends page Ortho on-call @[phone] with urgent questions/concerns.          "

## 2024-08-27 ENCOUNTER — DOCUMENTATION (OUTPATIENT)
Dept: HOME HEALTH SERVICES | Facility: HOME HEALTH | Age: 39
End: 2024-08-27
Payer: MEDICARE

## 2024-08-27 LAB
BACTERIA BLD CULT: NORMAL
BACTERIA BLD CULT: NORMAL
FUNGUS SPEC CULT: NORMAL
FUNGUS SPEC FUNGUS STN: NORMAL

## 2024-08-27 PROCEDURE — 2500000001 HC RX 250 WO HCPCS SELF ADMINISTERED DRUGS (ALT 637 FOR MEDICARE OP)

## 2024-08-27 PROCEDURE — 2500000004 HC RX 250 GENERAL PHARMACY W/ HCPCS (ALT 636 FOR OP/ED)

## 2024-08-27 PROCEDURE — 99232 SBSQ HOSP IP/OBS MODERATE 35: CPT | Performed by: INTERNAL MEDICINE

## 2024-08-27 PROCEDURE — 1100000001 HC PRIVATE ROOM DAILY

## 2024-08-27 PROCEDURE — 97530 THERAPEUTIC ACTIVITIES: CPT | Mod: GP,CQ

## 2024-08-27 RX ORDER — CEFTRIAXONE 1 G/50ML
2 INJECTION, SOLUTION INTRAVENOUS EVERY 24 HOURS
Qty: 3900 ML | Refills: 0 | Status: CANCELLED | OUTPATIENT
Start: 2024-08-27 | End: 2024-10-05

## 2024-08-27 RX ADMIN — ACETAMINOPHEN 650 MG: 325 TABLET ORAL at 11:49

## 2024-08-27 RX ADMIN — PIPERACILLIN SODIUM AND TAZOBACTAM SODIUM 3.38 G: 3; .375 INJECTION, SOLUTION INTRAVENOUS at 11:49

## 2024-08-27 RX ADMIN — PIPERACILLIN SODIUM AND TAZOBACTAM SODIUM 3.38 G: 3; .375 INJECTION, SOLUTION INTRAVENOUS at 17:38

## 2024-08-27 RX ADMIN — CYCLOBENZAPRINE 10 MG: 10 TABLET, FILM COATED ORAL at 05:33

## 2024-08-27 RX ADMIN — PIPERACILLIN SODIUM AND TAZOBACTAM SODIUM 3.38 G: 3; .375 INJECTION, SOLUTION INTRAVENOUS at 00:23

## 2024-08-27 RX ADMIN — GABAPENTIN 300 MG: 300 CAPSULE ORAL at 05:31

## 2024-08-27 RX ADMIN — ACETAMINOPHEN 650 MG: 325 TABLET ORAL at 00:23

## 2024-08-27 RX ADMIN — Medication 1 TABLET: at 21:22

## 2024-08-27 RX ADMIN — CYCLOBENZAPRINE 10 MG: 10 TABLET, FILM COATED ORAL at 14:58

## 2024-08-27 RX ADMIN — ACETAMINOPHEN 650 MG: 325 TABLET ORAL at 05:31

## 2024-08-27 RX ADMIN — CYCLOBENZAPRINE 10 MG: 10 TABLET, FILM COATED ORAL at 21:25

## 2024-08-27 RX ADMIN — ACETAMINOPHEN 650 MG: 325 TABLET ORAL at 17:38

## 2024-08-27 RX ADMIN — PIPERACILLIN SODIUM AND TAZOBACTAM SODIUM 3.38 G: 3; .375 INJECTION, SOLUTION INTRAVENOUS at 23:53

## 2024-08-27 RX ADMIN — Medication 1 TABLET: at 08:28

## 2024-08-27 RX ADMIN — OXYCODONE HYDROCHLORIDE 10 MG: 5 TABLET ORAL at 21:25

## 2024-08-27 RX ADMIN — OXYCODONE HYDROCHLORIDE 10 MG: 5 TABLET ORAL at 00:23

## 2024-08-27 RX ADMIN — GABAPENTIN 300 MG: 300 CAPSULE ORAL at 21:22

## 2024-08-27 RX ADMIN — ASPIRIN 81 MG: 81 TABLET, COATED ORAL at 08:28

## 2024-08-27 RX ADMIN — PIPERACILLIN SODIUM AND TAZOBACTAM SODIUM 3.38 G: 3; .375 INJECTION, SOLUTION INTRAVENOUS at 05:30

## 2024-08-27 RX ADMIN — OXYCODONE HYDROCHLORIDE 5 MG: 5 TABLET ORAL at 08:28

## 2024-08-27 RX ADMIN — ACETAMINOPHEN 650 MG: 325 TABLET ORAL at 23:53

## 2024-08-27 RX ADMIN — GABAPENTIN 300 MG: 300 CAPSULE ORAL at 14:58

## 2024-08-27 RX ADMIN — ASPIRIN 81 MG: 81 TABLET, COATED ORAL at 21:22

## 2024-08-27 ASSESSMENT — COGNITIVE AND FUNCTIONAL STATUS - GENERAL
MOVING TO AND FROM BED TO CHAIR: A LITTLE
MOBILITY SCORE: 20
CLIMB 3 TO 5 STEPS WITH RAILING: A LITTLE
CLIMB 3 TO 5 STEPS WITH RAILING: A LITTLE
DAILY ACTIVITIY SCORE: 24
MOBILITY SCORE: 23
STANDING UP FROM CHAIR USING ARMS: A LITTLE
WALKING IN HOSPITAL ROOM: A LITTLE

## 2024-08-27 ASSESSMENT — PAIN SCALES - GENERAL
PAINLEVEL_OUTOF10: 2
PAINLEVEL_OUTOF10: 7
PAINLEVEL_OUTOF10: 6
PAINLEVEL_OUTOF10: 4

## 2024-08-27 ASSESSMENT — PAIN - FUNCTIONAL ASSESSMENT
PAIN_FUNCTIONAL_ASSESSMENT: 0-10

## 2024-08-27 NOTE — HH CARE COORDINATION
Home Care received a Referral for Infusion and Nursing. We have processed the referral for a Start of Care on 8/29/24.     If you have any questions or concerns, please feel free to contact us at [phone]. Follow the prompts, enter your five digit zip code, and you will be directed to your care team on [facility].

## 2024-08-27 NOTE — PROGRESS NOTES
Physical Therapy    Physical Therapy Treatment    Patient Name: Janette Panda  MRN: [id number]  Today's Date: 8/27/2024  Time Calculation  Start Time: 1047  Stop Time: 1058  Time Calculation (min): 11 min    Assessment/Plan   PT Assessment  Barriers to Discharge: Awaiting home care set up  End of Session Communication: Bedside nurse  End of Session Patient Position: Bed, 2 rail up  PT Plan  Inpatient/Swing Bed or Outpatient: Inpatient  PT Plan  Treatment/Interventions: Transfer training, Gait training, Stair training, Balance training, Neuromuscular re-education, Endurance training, Therapeutic activity, Home exercise program, Postural re-education  PT Plan: Ongoing PT  PT Frequency: 3 times per week  PT Discharge Recommendations: No PT needed after discharge  Equipment Recommended upon Discharge: Other (comment) (axillary crutches and shower chair)  PT Recommended Transfer Status: Assist x1, Assistive device, Stand by assist (axillary crutches, NWB Right LE)  PT - OK to Discharge: Yes (PT eval completed & DC recs made)      General Visit Information:   PT  Visit  PT Received On: 08/27/24  General  Reason for Referral: 39 y.o. female s/p R calcanectomy, placement of antibiotic spacer, complex wound closure  on 8/23 with Dr. Lee.  Past Medical History Relevant to Rehab: Acute osteomyelitis of right calcaneus, Anemia, Anxiety, Arthritis, Asthma (HHS-HCC), Metastatic lung cancer, and PTSD  Missed Visit: No  Caregiver Feedback: S.O. sleeping in room.  Prior to Session Communication: Bedside nurse  Patient Position Received: Bed, 2 rail up, Alarm off, not on at start of session  General Comment: Pt. seated EOB upon arrival eating breakfast- Pt. willing to participate regardless.    Subjective   Precautions:  Precautions  LE Weight Bearing Status: Right Non-Weight Bearing  Medical Precautions: Fall precautions  Vital Signs:       Objective   Pain:  Pain Assessment  Pain Assessment: 0-10  0-10 (Numeric) Pain Score:  2  Cognition:  Cognition  Overall Cognitive Status: Within Functional Limits  Orientation Level: Oriented X4  Coordination:  Movements are Fluid and Coordinated: Yes  Postural Control:  Postural Control  Postural Control: Within Functional Limits  Static Sitting Balance  Static Sitting-Balance Support: Feet supported  Static Sitting-Level of Assistance: Modified independent  Static Standing Balance  Static Standing-Balance Support: Bilateral upper extremity supported  Static Standing-Level of Assistance:  (Supervision with crutches.)  Extremity/Trunk Assessments:                      Activity Tolerance:  Activity Tolerance  Endurance: Endurance does not limit participation in activity  Treatments:  Ambulation/Gait Training  Ambulation/Gait Training Performed:  (Pt. amb. 100' x 2 using bilat crutches with supervision- Pt. tends to move quickly but no LOB.)  Transfers  Transfer: Yes  Transfer 1  Transfer From 1: Sit to, Stand to  Transfer to 1: Stand, Sit  Transfer Level of Assistance 1:  (Supervision- Pt. following correct technique.)    Stairs  Stairs: Yes  Stairs  Rails 1:  (Pt. ascended and descended 4 steps with bilat crutches and supervision.)    Outcome Measures:  [facility] Basic Mobility  Turning from your back to your side while in a flat bed without using bedrails: None  Moving from lying on your back to sitting on the side of a flat bed without using bedrails: None  Moving to and from bed to chair (including a wheelchair): A little  Standing up from a chair using your arms (e.g. wheelchair or bedside chair): A little  To walk in hospital room: A little  Climbing 3-5 steps with railing: A little  Basic Mobility - Total Score: 20    Education Documentation  Precautions, taught by Patricia Owens PTA at 8/27/2024 11:12 AM.  Learner: Patient  Readiness: Acceptance  Method: Explanation, Demonstration  Response: Needs Reinforcement    Body Mechanics, taught by Patricia Owens PTA at 8/27/2024 11:12 AM.  Learner:  Patient  Readiness: Acceptance  Method: Explanation, Demonstration  Response: Needs Reinforcement    Mobility Training, taught by Patricia Owens PTA at 8/27/2024 11:12 AM.  Learner: Patient  Readiness: Acceptance  Method: Explanation, Demonstration  Response: Needs Reinforcement    Education Comments  No comments found.        OP EDUCATION:       Encounter Problems       Encounter Problems (Active)       PT Problem       ambulate >500' NWB Right LE with Crutches Modified Independent  (Progressing)       Start:  08/24/24    Expected End:  09/09/24            sit to/from stand, bed to/from chair NWB Right LE with crutches modified independent (Progressing)       Start:  08/24/24    Expected End:  09/09/24            supine to sit and sit to supine Independent (HOB flat, no rail) (Progressing)       Start:  08/24/24    Expected End:  09/09/24            up/down 10 steps with 1 rail and 1 crutch NWB Right LE modified independent (Progressing)       Start:  08/24/24    Expected End:  09/09/24               Pain - Adult

## 2024-08-27 NOTE — PROGRESS NOTES
Patient is medically cleared for discharge home needing home health care for IV antibiotics Ertapenem 1 gram Q 24 hours STOP DATE 10/5/24 but patient is refusing because she can not afford the $112/week co payment for supplies needed for the antibiotic infusion. I offered an Infusion Center as an option but patient is stating that she does not have a ride, she states that she absolutely will not go to a skilled nursing facility either. I did relay all of this information to the team. The team states that they will speak with Infectious Disease to see if there is an oral antibiotic that patient can take. I will continue to follow with a safe discharge plan. ADDENDUM: Patient is now agreeable to the $112/weekly co payment for supplies associated with in home antibiotic infusion, awaiting processing of SOC. I did vend patient a pair of crutches which were approved by [employer], all paperwork completed and placed in the [employer] Folder.

## 2024-08-27 NOTE — CARE PLAN
The patient's goals for the shift include      The clinical goals for the shift include remain free from falls and injury    Over the shift, the patient did not make progress toward the following goals. Barriers to progression include none. Recommendations to address these barriers include none  Problem: Pain - Adult  Goal: Verbalizes/displays adequate comfort level or baseline comfort level  Outcome: Progressing     Problem: Safety - Adult  Goal: Free from fall injury  Outcome: Progressing     Problem: Discharge Planning  Goal: Discharge to home or other facility with appropriate resources  Outcome: Progressing     Problem: Chronic Conditions and Co-morbidities  Goal: Patient's chronic conditions and co-morbidity symptoms are monitored and maintained or improved  Outcome: Progressing     Problem: Fall/Injury  Goal: Not fall by end of shift  Outcome: Progressing  Goal: Be free from injury by end of the shift  Outcome: Progressing  Goal: Verbalize understanding of personal risk factors for fall in the hospital  Outcome: Progressing  Goal: Verbalize understanding of risk factor reduction measures to prevent injury from fall in the home  Outcome: Progressing  Goal: Use assistive devices by end of the shift  Outcome: Progressing  Goal: Pace activities to prevent fatigue by end of the shift  Outcome: Progressing   .

## 2024-08-27 NOTE — PROGRESS NOTES
"Orthopaedic Surgery Progress Note    S:  Pain well controlled. Denies chest pain, shortness of breath, or fevers. PICC placed.    O:  /69   Pulse 60   Temp 36.5 °C (97.7 °F) (Tympanic)   Resp 18   Ht 1.664 m (5' 5.5\")   Wt 70.8 kg (156 lb)   SpO2 97%   BMI 25.56 kg/m²     Gen: arousable, NAD, appropriately conversational  Cardiac: extremities warm  Resp: nonlabored on RA    MSK:  Right Lower Extremity:   -SLS c/d/i  -wiggles toes without pain  -SILT to toes  -Toes warm, well perfused  -Brisk cap refill    A/P: 39 y.o. female s/p R calcanectomy, placement of antibiotic spacer, complex wound closure on 8/23 with Dr. Lee.      Plan:  - Weight bearing: NWB RLE in splint  - DVT ppx: SCDs, ASA 81 bid  - ID consult. Appreciate recs. Currently  - vanc/zosyn  - Intra-op CX with mixed skin rachana  - Intra-op Path still pending  - Diet: Regular  - Pain: Tylenol, oxycodone 5/10  - FEN: HLIV with good PO intake  - Bowel Regimen: Colace, senna, dulcolax  - Pulm: Encourage IS  - Continue home medications  - PT rec outpatient PT    Dispo: pending final ID recs    Gerry Rice MD   Available via Epic Chat    While inpatient, this patient will be followed by the Orthopaedic Trauma team. Please see contact information below:      First call: Clayton Kiser, PGY-1, Gerry Rice, PGY-1  Second call: Arvind Winter, PGY-2   Third call: Colt Spencer, PGY-3    6pm-6am M-F, Holidays, and weekends page Ortho on-call @[phone] with urgent questions/concerns.          "

## 2024-08-28 ENCOUNTER — HOME INFUSION (OUTPATIENT)
Dept: INFUSION THERAPY | Age: 39
End: 2024-08-28
Payer: MEDICARE

## 2024-08-28 ENCOUNTER — DOCUMENTATION (OUTPATIENT)
Dept: PHARMACY | Facility: CLINIC | Age: 39
End: 2024-08-28

## 2024-08-28 VITALS
WEIGHT: 156 LBS | HEART RATE: 72 BPM | BODY MASS INDEX: 25.07 KG/M2 | DIASTOLIC BLOOD PRESSURE: 75 MMHG | HEIGHT: 66 IN | OXYGEN SATURATION: 100 % | SYSTOLIC BLOOD PRESSURE: 107 MMHG | RESPIRATION RATE: 18 BRPM | TEMPERATURE: 97.3 F

## 2024-08-28 LAB
ACID FAST STN SPEC: NORMAL
BACTERIA BLD CULT: NORMAL
MYCOBACTERIUM SPEC CULT: NORMAL

## 2024-08-28 PROCEDURE — 2500000001 HC RX 250 WO HCPCS SELF ADMINISTERED DRUGS (ALT 637 FOR MEDICARE OP)

## 2024-08-28 PROCEDURE — 2500000004 HC RX 250 GENERAL PHARMACY W/ HCPCS (ALT 636 FOR OP/ED)

## 2024-08-28 RX ORDER — ERTAPENEM 1 G/1
1 INJECTION, POWDER, LYOPHILIZED, FOR SOLUTION INTRAMUSCULAR; INTRAVENOUS EVERY 24 HOURS
Status: DISCONTINUED | OUTPATIENT
Start: 2024-08-28 | End: 2024-08-28 | Stop reason: HOSPADM

## 2024-08-28 RX ADMIN — CYCLOBENZAPRINE 10 MG: 10 TABLET, FILM COATED ORAL at 12:39

## 2024-08-28 RX ADMIN — PIPERACILLIN SODIUM AND TAZOBACTAM SODIUM 3.38 G: 3; .375 INJECTION, SOLUTION INTRAVENOUS at 05:19

## 2024-08-28 RX ADMIN — ASPIRIN 81 MG: 81 TABLET, COATED ORAL at 08:05

## 2024-08-28 RX ADMIN — ACETAMINOPHEN 650 MG: 325 TABLET ORAL at 05:20

## 2024-08-28 RX ADMIN — GABAPENTIN 300 MG: 300 CAPSULE ORAL at 05:21

## 2024-08-28 RX ADMIN — ACETAMINOPHEN 650 MG: 325 TABLET ORAL at 12:37

## 2024-08-28 RX ADMIN — SODIUM CHLORIDE 1 G: 900 INJECTION INTRAVENOUS at 12:40

## 2024-08-28 RX ADMIN — Medication 1 TABLET: at 08:05

## 2024-08-28 RX ADMIN — OXYCODONE HYDROCHLORIDE 10 MG: 5 TABLET ORAL at 05:20

## 2024-08-28 ASSESSMENT — COGNITIVE AND FUNCTIONAL STATUS - GENERAL
DAILY ACTIVITIY SCORE: 24
CLIMB 3 TO 5 STEPS WITH RAILING: A LITTLE
MOBILITY SCORE: 23

## 2024-08-28 ASSESSMENT — PAIN SCALES - GENERAL
PAINLEVEL_OUTOF10: 6
PAINLEVEL_OUTOF10: 8
PAINLEVEL_OUTOF10: 1
PAINLEVEL_OUTOF10: 8
PAINLEVEL_OUTOF10: 5 - MODERATE PAIN

## 2024-08-28 NOTE — CARE PLAN
The patient's goals for the shift include  remain free from falls and verbalize comfort level.    The clinical goals for the shift include maintain pt comfort and safety

## 2024-08-28 NOTE — PROGRESS NOTES
Reviewed pt info as correct    Diagnosis: draining post-operative wound (s/p right calcaneus I&D)  PMH: metastatic lung cancer, tongue-type calcaneus fracture    Allergies   Allergen Reactions    Amitriptyline Itching and Other     nausea    Tetracyclines Nausea/vomiting     Vomiting     Adhesive Tape-Silicones Itching    Latex Unknown    Other Itching and Other     Tegaderm Absorbent Dressing -itching  [id number] Standard - blisters and rash    Tramadol Other     N/V        Review of labs at discharge  Line info: pt has a SL PICC line to be managed per [facility] protocol  Pt ordered IV ertapenem 1g q24h through 10/4/24  Labs ordered include weekly CBC/d, CMP, CRP  Med placed in minibag plus  Care plan done today    Janette Panda is a 39 y.o. female discharged from hospital to [facility] for skilled nursing and pharmacy services. ADOD 8/28 SOC 8/29  Followed by Dr. Omer  Follow up appt 10/3/24    Rx dispensed the following with flushes and supplies to match with delivery by 9pm 8/28 :  9x ertapenem 1g MB+  DOS 8/29-9/6    Follow up 9/5 : progress, labs, delivery OVN    Spoke at length with patient. Verified correct address and phone #. Reviewed [facility] services and answered all questions. RN to call pt with time of appt. Pt agreed to delivery by 9pm this evening.   to call patient at [phone] before delivery.    Confirmed address as:  [address]  [facility] [address]

## 2024-08-28 NOTE — DISCHARGE SUMMARY
ORTHOPAEDIC SURGERY DISCHARGE SUMMARY    Discharge Diagnosis  Draining postoperative wound    Issues Requiring Follow-Up  Routine Postoperative Followup    Test Results Pending At Discharge  Pending Labs       Order Current Status    Surgical Pathology Exam In process    AFB Culture/Smear Preliminary result    Fungal Culture/Smear Preliminary result    Fungal Culture/Smear Preliminary result    Fungal Culture/Smear Preliminary result            Hospital Course  39 y.o. year-old female who presented with infection/osteomyelitis of her right calcaneus. Patient is now s/p right calcanectomy, placement of antibiotic spacer, complex wound closure on 8/23/24 with Dr. José Miguel Lee . On the day of surgery, patient was identified in the pre-operative holding area and agreeable to proceed with surgery. Written consent was obtained.  Please see operative note for further details of this procedure. Patient received 24 hours of cristy-operative antibiotics. Patient recovered in the PACU before transfer to a regular nursing floor. Patient was started on oxycodone and Tylenol for pain control. Physical therapy recommended continued recovery at home with continued physical therapy and wound care. On the day of discharge, patient was afebrile with stable vital signs. Patient was neurovascularly intact at time of discharge. Patient was discharged with prescription of Asprin 81mg BID for DVT prophylaxis for 4 weeks. Patient will follow-up with Dr. José Miguel Lee  in 2 weeks for a postoperative visit.     Physical Exam:   MSK:  Gen: arousable, NAD, appropriately conversational  Cardiac: extremities warm  Resp: nonlabored on RA     MSK:  Right Lower Extremity:   -SLS c/d/i  -wiggles toes without pain  -SILT to toes  -Toes warm, well perfused  -Brisk cap refill    Home Medications     Medication List      START taking these medications     aspirin 81 mg EC tablet; Take 1 tablet (81 mg) by mouth 2 times a day.   calcium  carbonate-vitamin D3 500 mg-10 mcg (400 unit) tablet; Commonly   known as: Oscal-500; Take 1 tablet by mouth 2 times a day.   docusate sodium 100 mg capsule; Commonly known as: Colace; Take 1   capsule (100 mg) by mouth 2 times a day.   ertapenem 1,000 mg in sodium chloride 0.9% 50 mL IV; Infuse 1,000 mg at   100 mL/hr over 30 minutes into a venous catheter once every 24 hours.     CHANGE how you take these medications     * oxyCODONE 10 mg immediate release tablet; Commonly known as:   Roxicodone; Take 1 tablet (10 mg) by mouth every 6 hours if needed for   severe pain (7 - 10). 1 TO 2 TIMES A DAY, AS NEEDED FOR SEVERE   BREAKTHROUGH PAIN; What changed: Another medication with the same name was   added. Make sure you understand how and when to take each.   * oxyCODONE 5 mg immediate release tablet; Commonly known as:   Roxicodone; Take 1 tablet (5 mg) by mouth every 6 hours if needed for   severe pain (7 - 10) for up to 28 doses.; What changed: You were already   taking a medication with the same name, and this prescription was added.   Make sure you understand how and when to take each.  * This list has 2 medication(s) that are the same as other medications   prescribed for you. Read the directions carefully, and ask your doctor or   other care provider to review them with you.     CONTINUE taking these medications     albuterol 90 mcg/actuation inhaler   gabapentin 300 mg capsule; Commonly known as: Neurontin; Take 1 capsule   (300 mg) by mouth 3 times a day as needed (nerve pain).   montelukast 10 mg tablet; Commonly known as: Singulair   phenylephrine 10 mg tablet; Commonly known as: Sudafed PE   Symbicort 80-4.5 mcg/actuation inhaler; Generic drug:   budesonide-formoteroL   Tylenol Extra Strength 500 mg tablet; Generic drug: acetaminophen     STOP taking these medications     chlorhexidine 4 % external liquid; Commonly known as: Hibiclens       Outpatient Follow-Up  Future Appointments   Date Time Provider  Department Center   8/29/2024 To Be Determined Pita Clark RN [facility]   9/17/2024  1:00 PM Park Ashford PA-C [id number] [facility]   10/3/2024 11:00 AM José Miguel Omer MD MPH [id number] [facility]   10/31/2024 11:20 AM José Miguel Omer MD MPH [id number] [facility]         Clayton Kiser MD   Orthopedic Surgery PGY1  [facility]

## 2024-08-28 NOTE — NURSING NOTE
Discharge instruction reviewed with patient and significant other. No question or concerns voiced. Prescriptions to be pick-up at patient preferred pharmacy. All belongings taken with patient.  Discharged via wheelchair in stable condition.

## 2024-08-28 NOTE — PROGRESS NOTES
"Janette Panda is a 39 y.o. female on day 4 of admission presenting with Draining postoperative wound.    Subjective   Interval History:   She denies pain.  She states that she has muscle spasms in her calf.  Describes foot as \"burning\".  Denies fevers, chills, night sweats.   Having soft stools. No watery diarrhea          Objective   Range of Vitals (last 24 hours)  Heart Rate:  [65-79]   Temp:  [36.3 °C (97.3 °F)-36.5 °C (97.7 °F)]   Resp:  [16-18]   BP: ()/(61-68)   SpO2:  [98 %-100 %]   Daily Weight  08/23/24 : 70.8 kg (156 lb)    Body mass index is 25.56 kg/m².    Physical Exam  Constitutional: WD, NAD  Eyes: PERRL, anicteric sclera.   ENT:  MMM, no oral lesions noted, no thrush   LUNGS: Breathing unlabored.  Clear in anterolateral lung fields.  CVS: RRR, S1 & S2 normal.    ABD: Soft, NT / ND,   MSK: Right foot in surgical cast. Able to wiggle toes.  Good capillary refill.    Antibiotics  chlorhexidine - 4 %  ERTAPENEM IV IN 50 ML  piperacillin-tazobactam - 3.375 gram/50 mL    Relevant Results  Labs      Results from last 72 hours   Lab Units 08/26/24  1138   SODIUM mmol/L 138   POTASSIUM mmol/L 3.8   CHLORIDE mmol/L 102   CO2 mmol/L 27   BUN mg/dL 3*   CREATININE mg/dL 0.76   GLUCOSE mg/dL 61*   CALCIUM mg/dL 9.1   ANION GAP mmol/L 13   EGFR mL/min/1.73m*2 >90   PHOSPHORUS mg/dL 3.0     Results from last 72 hours   Lab Units 08/26/24  1138   ALBUMIN g/dL 3.7     Estimated Creatinine Clearance: 98.2 mL/min (by C-G formula based on SCr of 0.76 mg/dL).  C-Reactive Protein   Date Value Ref Range Status   10/17/2023 0.55 <1.00 mg/dL Final   10/05/2023 0.30 <1.00 mg/dL Final     CRP   Date Value Ref Range Status   09/27/2023 1.21 (A) mg/dL Final     Comment:     REF VALUE  < 1.00       Microbiology  Susceptibility data from last 14 days.  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 No sign of systemic infection. Was found to have intraosseous abscess at OR. Initiated with zosyn and vancomycin. Operative cultures are growing mixed aerobic and anaerobic rachana.     Recommendations:    Stop Piperacillin-tazobactam     Start Ertapenem 1 gm IV q 24 hours with TENTATIVE STOP DATE of 10/4/24 but this will be determined by her follow-up lab studies. She may transition to oral therapy after completion of IV medication.    After discharge, the following labs will need to be collected weekly:  CBC with diff, Chem 23, and quantitative CRP.  Fax all results to [phone], attn. ID Attendings at [facility]: Dr. José Miguel Omer    An appointment in the Infectious Disease clinic will be made with Dr. José Miguel Omer.  Please enter this information into the Discharge Orders.  I am attempting to move her appointment currently scheduled on 10/31 to an earlier date.  Please verify prior to discharge.    Thank-you for allowing us to assist in your patient's management.  We are signing off.  Call if any further issues should arise or you should have any questions.      I spent 25 minutes in the professional and overall care of this patient.      Rosalind Wu MD.  (please reach through EPIC Chat)  Infectious Diseases, Senior Attending Physician

## 2024-08-28 NOTE — PROGRESS NOTES
"Orthopaedic Surgery Progress Note    S:  Pain well controlled. Denies chest pain, shortness of breath, or fevers.    O:  /74 (Patient Position: Lying)   Pulse 78   Temp 36 °C (96.8 °F) (Temporal)   Resp 17   Ht 1.664 m (5' 5.5\")   Wt 70.8 kg (156 lb)   SpO2 100%   BMI 25.56 kg/m²     Gen: arousable, NAD, appropriately conversational  Cardiac: extremities warm  Resp: nonlabored on RA    MSK:  Right Lower Extremity:   -SLS c/d/i  -wiggles toes without pain  -SILT to toes  -Toes warm, well perfused  -Brisk cap refill    A/P: 39 y.o. female s/p R calcanectomy, placement of antibiotic spacer, complex wound closure on 8/23 with Dr. Lee.      Plan:  - Weight bearing: NWB RLE in splint  - DVT ppx: SCDs, ASA 81 bid  - ID consult. Appreciate recs:   -Ertapenem 1 gm IV q 24 hours   -Weekly BC with diff, Chem 23, and quantitative CRP   - Intra-op CX with mixed skin rachana  - Intra-op Path pending  - Diet: Regular  - Pain: Tylenol, oxycodone 5/10  - FEN: HLIV with good PO intake  - Bowel Regimen: Colace, senna, dulcolax  - Pulm: Encourage IS  - Continue home medications  - PT rec outpatient PT    Dispo: pending final ID recs    Gerry Rice MD   Available via Epic Chat    While inpatient, this patient will be followed by the Orthopaedic Trauma team. Please see contact information below:      First call: Clayton Kiser, PGY-1, Gerry Rice, PGY-1  Second call: Arvind Winter, PGY-2   Third call: Colt Spencer, PGY-3    6pm-6am M-F, Holidays, and weekends page Ortho on-call @[phone] with urgent questions/concerns.          "

## 2024-08-28 NOTE — CARE PLAN
The patient's goals for the shift include      The clinical goals for the shift include remain free from falls and injury      Problem: Pain - Adult  Goal: Verbalizes/displays adequate comfort level or baseline comfort level  Outcome: Progressing     Problem: Safety - Adult  Goal: Free from fall injury  Outcome: Progressing     Problem: Discharge Planning  Goal: Discharge to home or other facility with appropriate resources  Outcome: Progressing     Problem: Chronic Conditions and Co-morbidities  Goal: Patient's chronic conditions and co-morbidity symptoms are monitored and maintained or improved  Outcome: Progressing     Problem: Fall/Injury  Goal: Not fall by end of shift  Outcome: Progressing  Goal: Be free from injury by end of the shift  Outcome: Progressing  Goal: Verbalize understanding of personal risk factors for fall in the hospital  Outcome: Progressing  Goal: Verbalize understanding of risk factor reduction measures to prevent injury from fall in the home  Outcome: Progressing  Goal: Use assistive devices by end of the shift  Outcome: Progressing  Goal: Pace activities to prevent fatigue by end of the shift  Outcome: Progressing

## 2024-08-29 ENCOUNTER — HOME CARE VISIT (OUTPATIENT)
Dept: HOME HEALTH SERVICES | Facility: HOME HEALTH | Age: 39
End: 2024-08-29
Payer: MEDICARE

## 2024-08-29 PROCEDURE — G0299 HHS/HOSPICE OF RN EA 15 MIN: HCPCS | Mod: HHH

## 2024-08-29 PROCEDURE — 169592 NO-PAY CLAIM PROCEDURE

## 2024-08-29 SDOH — ECONOMIC STABILITY: FOOD INSECURITY: MEALS PER DAY: 3

## 2024-08-29 ASSESSMENT — ENCOUNTER SYMPTOMS
CHANGE IN APPETITE: UNCHANGED
APPETITE LEVEL: FAIR
HIGHEST PAIN SEVERITY IN PAST 24 HOURS: 0/10
LOWEST PAIN SEVERITY IN PAST 24 HOURS: 0/10
PAIN SEVERITY GOAL: 0/10
PAIN: 1
SUBJECTIVE PAIN PROGRESSION: UNCHANGED

## 2024-08-29 ASSESSMENT — ACTIVITIES OF DAILY LIVING (ADL)
AMBULATION ASSISTANCE: ONE PERSON
ENTERING_EXITING_HOME: STAND BY ASSIST
CURRENT_FUNCTION: ONE PERSON
OASIS_M1830: 02

## 2024-08-30 ENCOUNTER — TELEPHONE (OUTPATIENT)
Dept: ORTHOPEDIC SURGERY | Facility: HOSPITAL | Age: 39
End: 2024-08-30
Payer: MEDICARE

## 2024-08-30 ENCOUNTER — PATIENT MESSAGE (OUTPATIENT)
Dept: ORTHOPEDIC SURGERY | Facility: HOSPITAL | Age: 39
End: 2024-08-30
Payer: MEDICARE

## 2024-08-30 DIAGNOSIS — M86.171 ACUTE OSTEOMYELITIS OF RIGHT CALCANEUS (MULTI): ICD-10-CM

## 2024-08-30 RX ORDER — PNV NO.95/FERROUS FUM/FOLIC AC 28MG-0.8MG
1 TABLET ORAL 2 TIMES DAILY
Qty: 60 TABLET | Refills: 11 | Status: SHIPPED | OUTPATIENT
Start: 2024-08-30 | End: 2025-08-30

## 2024-08-30 NOTE — TELEPHONE ENCOUNTER
Patient is requesting to switch from vitamin D/Calcium chews to tablets.     Pended tablets to be sent.   Pharm verified [facility] [address]      Tyra Barrientos LPN

## 2024-09-02 ENCOUNTER — PATIENT MESSAGE (OUTPATIENT)
Dept: HEMATOLOGY/ONCOLOGY | Facility: HOSPITAL | Age: 39
End: 2024-09-02
Payer: MEDICARE

## 2024-09-02 VITALS
OXYGEN SATURATION: 98 % | HEART RATE: 88 BPM | TEMPERATURE: 98.6 F | SYSTOLIC BLOOD PRESSURE: 111 MMHG | DIASTOLIC BLOOD PRESSURE: 67 MMHG

## 2024-09-02 ASSESSMENT — ENCOUNTER SYMPTOMS
MUSCLE WEAKNESS: 1
PERSON REPORTING PAIN: PATIENT
PAIN LOCATION: GENERALIZED
PAIN LOCATION - PAIN QUALITY: ACHY
PAIN LOCATION - PAIN SEVERITY: 0/10
PAIN LOCATION - PAIN FREQUENCY: INTERMITTENT

## 2024-09-03 ENCOUNTER — TELEPHONE (OUTPATIENT)
Dept: ADMISSION | Facility: HOSPITAL | Age: 39
End: 2024-09-03
Payer: MEDICARE

## 2024-09-03 ENCOUNTER — TELEPHONE (OUTPATIENT)
Dept: HEMATOLOGY/ONCOLOGY | Facility: HOSPITAL | Age: 39
End: 2024-09-03

## 2024-09-03 LAB
FUNGUS SPEC CULT: NORMAL
FUNGUS SPEC FUNGUS STN: NORMAL

## 2024-09-03 PROCEDURE — RXMED WILLOW AMBULATORY MEDICATION CHARGE

## 2024-09-03 RX ORDER — PNV NO.95/FERROUS FUM/FOLIC AC 28MG-0.8MG
1 TABLET ORAL 2 TIMES DAILY
Qty: 60 TABLET | Refills: 11 | Status: SHIPPED | OUTPATIENT
Start: 2024-09-03 | End: 2025-09-03

## 2024-09-03 NOTE — TELEPHONE ENCOUNTER
Pt is requesting a refill on Oxycodone 10mg to be [facility] to [facility]. Message sent to the team.

## 2024-09-03 NOTE — PATIENT COMMUNICATION
Needs to be resent to [facility] pharmacy. [facility] not [facility] only [facility]     Pended med, please double check pharm     Tyra Barrientos LPN

## 2024-09-04 ENCOUNTER — PHARMACY VISIT (OUTPATIENT)
Dept: PHARMACY | Facility: CLINIC | Age: 39
End: 2024-09-04
Payer: COMMERCIAL

## 2024-09-04 ENCOUNTER — LAB (OUTPATIENT)
Dept: LAB | Facility: HOSPITAL | Age: 39
End: 2024-09-04
Payer: MEDICARE

## 2024-09-04 ENCOUNTER — TELEPHONE (OUTPATIENT)
Dept: HEMATOLOGY/ONCOLOGY | Facility: HOSPITAL | Age: 39
End: 2024-09-04

## 2024-09-04 DIAGNOSIS — C34.90 ADENOCARCINOMA, LUNG, UNSPECIFIED LATERALITY (MULTI): ICD-10-CM

## 2024-09-04 DIAGNOSIS — M86.171 ACUTE OSTEOMYELITIS OF RIGHT CALCANEUS (MULTI): ICD-10-CM

## 2024-09-04 LAB
ACID FAST STN SPEC: NORMAL
ALBUMIN SERPL BCP-MCNC: 3.9 G/DL (ref 3.4–5)
ALP SERPL-CCNC: 73 U/L (ref 33–110)
ALT SERPL W P-5'-P-CCNC: 12 U/L (ref 7–45)
ANION GAP SERPL CALC-SCNC: 14 MMOL/L (ref 10–20)
AST SERPL W P-5'-P-CCNC: 12 U/L (ref 9–39)
BASOPHILS # BLD AUTO: 0.05 X10*3/UL (ref 0–0.1)
BASOPHILS NFR BLD AUTO: 0.5 %
BILIRUB SERPL-MCNC: 0.3 MG/DL (ref 0–1.2)
BUN SERPL-MCNC: 6 MG/DL (ref 6–23)
CALCIUM SERPL-MCNC: 8.8 MG/DL (ref 8.6–10.3)
CHLORIDE SERPL-SCNC: 106 MMOL/L (ref 98–107)
CO2 SERPL-SCNC: 24 MMOL/L (ref 21–32)
CREAT SERPL-MCNC: 0.72 MG/DL (ref 0.5–1.05)
CRP SERPL-MCNC: 0.44 MG/DL
EGFRCR SERPLBLD CKD-EPI 2021: >90 ML/MIN/1.73M*2
EOSINOPHIL # BLD AUTO: 0.38 X10*3/UL (ref 0–0.7)
EOSINOPHIL NFR BLD AUTO: 3.6 %
ERYTHROCYTE [DISTWIDTH] IN BLOOD BY AUTOMATED COUNT: 14.6 % (ref 11.5–14.5)
GLUCOSE SERPL-MCNC: 67 MG/DL (ref 74–99)
HCT VFR BLD AUTO: 38.7 % (ref 36–46)
HGB BLD-MCNC: 12.4 G/DL (ref 12–16)
IMM GRANULOCYTES # BLD AUTO: 0.03 X10*3/UL (ref 0–0.7)
IMM GRANULOCYTES NFR BLD AUTO: 0.3 % (ref 0–0.9)
LABORATORY COMMENT REPORT: NORMAL
LYMPHOCYTES # BLD AUTO: 2.86 X10*3/UL (ref 1.2–4.8)
LYMPHOCYTES NFR BLD AUTO: 27.2 %
MCH RBC QN AUTO: 28.1 PG (ref 26–34)
MCHC RBC AUTO-ENTMCNC: 32 G/DL (ref 32–36)
MCV RBC AUTO: 88 FL (ref 80–100)
MONOCYTES # BLD AUTO: 0.56 X10*3/UL (ref 0.1–1)
MONOCYTES NFR BLD AUTO: 5.3 %
MYCOBACTERIUM SPEC CULT: NORMAL
NEUTROPHILS # BLD AUTO: 6.65 X10*3/UL (ref 1.2–7.7)
NEUTROPHILS NFR BLD AUTO: 63.1 %
NRBC BLD-RTO: 0 /100 WBCS (ref 0–0)
PATH REPORT.FINAL DX SPEC: NORMAL
PATH REPORT.GROSS SPEC: NORMAL
PATH REPORT.RELEVANT HX SPEC: NORMAL
PATH REPORT.TOTAL CANCER: NORMAL
PLATELET # BLD AUTO: 630 X10*3/UL (ref 150–450)
POTASSIUM SERPL-SCNC: 4 MMOL/L (ref 3.5–5.3)
PROT SERPL-MCNC: 7.4 G/DL (ref 6.4–8.2)
RBC # BLD AUTO: 4.41 X10*6/UL (ref 4–5.2)
SODIUM SERPL-SCNC: 140 MMOL/L (ref 136–145)
WBC # BLD AUTO: 10.5 X10*3/UL (ref 4.4–11.3)

## 2024-09-04 PROCEDURE — 86140 C-REACTIVE PROTEIN: CPT

## 2024-09-04 PROCEDURE — 85025 COMPLETE CBC W/AUTO DIFF WBC: CPT

## 2024-09-04 PROCEDURE — 80053 COMPREHEN METABOLIC PANEL: CPT

## 2024-09-04 RX ORDER — OXYCODONE HYDROCHLORIDE 10 MG/1
10 TABLET ORAL EVERY 4 HOURS PRN
Qty: 90 TABLET | Refills: 0 | Status: SHIPPED | OUTPATIENT
Start: 2024-09-04

## 2024-09-04 NOTE — TELEPHONE ENCOUNTER
Called and spoke with Janette - informed her that Lilliam Flannery will be sending in her Rx for Oxycodone to [facility] on [address]. Janette did not have a follow up appt scheduled with Lilliam Flannery and requested to schedule now. Appt scheduled per Lilliam's note for October 16th 2024. Patient verbalized understanding and agreement with the plan. Zarina Parrish RN

## 2024-09-04 NOTE — TELEPHONE ENCOUNTER
Patient calling back to check on status of Rx refill for Oxycodone. She is out of medication. [facility].

## 2024-09-05 ENCOUNTER — HOME INFUSION (OUTPATIENT)
Dept: INFUSION THERAPY | Age: 39
End: 2024-09-05
Payer: MEDICARE

## 2024-09-05 NOTE — PROGRESS NOTES
Janette Panda is receiving ertapenem for post-op wound thru 10/4     Followed by Dr Omer.    Labs from 9/4 unremarkable    RN notes from 8/29 unremarkable     RX contacted pt, confirmed infusions going well and doses on hand thru 9/6. She is agreeable to delivery that day for another week of meds and supplies to match. She stated that she is allergic to tegaderm and statlock. Per Epic, pt is also allergic to Opsite. Email chain started with RN Central Team 3 to develop plan for securing PICC - pharmacy waiting for guidance on supplies to send with Fri delivery.    Mixing 9/5 and dispensing 9/6 with supplies to match   7x erta MB+  DOS 9/7-9/13    Follow up 9/12 check labs, send OVN

## 2024-09-06 ENCOUNTER — HOME INFUSION (OUTPATIENT)
Dept: INFUSION THERAPY | Age: 39
End: 2024-09-06

## 2024-09-06 ENCOUNTER — DOCUMENTATION (OUTPATIENT)
Dept: INFUSION THERAPY | Age: 39
End: 2024-09-06

## 2024-09-06 ENCOUNTER — LAB REQUISITION (OUTPATIENT)
Dept: LAB | Facility: HOSPITAL | Age: 39
End: 2024-09-06
Payer: MEDICARE

## 2024-09-06 ENCOUNTER — HOME CARE VISIT (OUTPATIENT)
Dept: HOME HEALTH SERVICES | Facility: HOME HEALTH | Age: 39
End: 2024-09-06
Payer: MEDICARE

## 2024-09-06 DIAGNOSIS — M86.171 OTHER ACUTE OSTEOMYELITIS, RIGHT ANKLE AND FOOT (MULTI): ICD-10-CM

## 2024-09-06 DIAGNOSIS — Z47.89 ENCOUNTER FOR OTHER ORTHOPEDIC AFTERCARE: ICD-10-CM

## 2024-09-06 LAB
ALBUMIN SERPL BCP-MCNC: 3.4 G/DL (ref 3.4–5)
ALP SERPL-CCNC: 86 U/L (ref 33–110)
ALT SERPL W P-5'-P-CCNC: 10 U/L (ref 7–45)
ANION GAP SERPL CALC-SCNC: 12 MMOL/L (ref 10–20)
AST SERPL W P-5'-P-CCNC: 15 U/L (ref 9–39)
BILIRUB SERPL-MCNC: 0.2 MG/DL (ref 0–1.2)
BUN SERPL-MCNC: 7 MG/DL (ref 6–23)
CALCIUM SERPL-MCNC: 8.7 MG/DL (ref 8.6–10.3)
CHLORIDE SERPL-SCNC: 102 MMOL/L (ref 98–107)
CO2 SERPL-SCNC: 27 MMOL/L (ref 21–32)
CREAT SERPL-MCNC: 0.75 MG/DL (ref 0.5–1.05)
CRP SERPL-MCNC: 0.61 MG/DL
EGFRCR SERPLBLD CKD-EPI 2021: >90 ML/MIN/1.73M*2
ERYTHROCYTE [DISTWIDTH] IN BLOOD BY AUTOMATED COUNT: 14.6 % (ref 11.5–14.5)
GLUCOSE SERPL-MCNC: 59 MG/DL (ref 74–99)
HCT VFR BLD AUTO: 33.9 % (ref 36–46)
HGB BLD-MCNC: 10.6 G/DL (ref 12–16)
MCH RBC QN AUTO: 28.2 PG (ref 26–34)
MCHC RBC AUTO-ENTMCNC: 31.3 G/DL (ref 32–36)
MCV RBC AUTO: 90 FL (ref 80–100)
NRBC BLD-RTO: 0 /100 WBCS (ref 0–0)
PLATELET # BLD AUTO: 699 X10*3/UL (ref 150–450)
POTASSIUM SERPL-SCNC: 4.3 MMOL/L (ref 3.5–5.3)
PROT SERPL-MCNC: 6.1 G/DL (ref 6.4–8.2)
RBC # BLD AUTO: 3.76 X10*6/UL (ref 4–5.2)
SODIUM SERPL-SCNC: 137 MMOL/L (ref 136–145)
WBC # BLD AUTO: 10.1 X10*3/UL (ref 4.4–11.3)

## 2024-09-06 PROCEDURE — G0299 HHS/HOSPICE OF RN EA 15 MIN: HCPCS | Mod: HHH

## 2024-09-06 PROCEDURE — 85027 COMPLETE CBC AUTOMATED: CPT

## 2024-09-06 PROCEDURE — 80053 COMPREHEN METABOLIC PANEL: CPT

## 2024-09-06 PROCEDURE — 86140 C-REACTIVE PROTEIN: CPT

## 2024-09-06 NOTE — PROGRESS NOTES
PER [facility] PT AGREED TO DELIVERY TODAY, AND WITH ABT OK TO SEND MATCHING SUPPLIES... PT ALLERGIC TO OPSIDE, SEND A MEDIPORE TAP...

## 2024-09-06 NOTE — PROGRESS NOTES
Received notice from IV RN that supplies should be standard with no opsites to be sent due to allergy. Mary Jo is stating that provider can put in order for sterile Duoderm to be placed under dressing. Pharmacy will continue to send guaze pads and Medipore tape as well.

## 2024-09-07 VITALS
HEART RATE: 78 BPM | OXYGEN SATURATION: 100 % | DIASTOLIC BLOOD PRESSURE: 76 MMHG | TEMPERATURE: 98.6 F | RESPIRATION RATE: 20 BRPM | SYSTOLIC BLOOD PRESSURE: 117 MMHG

## 2024-09-07 SDOH — ECONOMIC STABILITY: FOOD INSECURITY: MEALS PER DAY: 3

## 2024-09-07 ASSESSMENT — ENCOUNTER SYMPTOMS
HIGHEST PAIN SEVERITY IN PAST 24 HOURS: 0/10
PAIN: 1
PAIN LOCATION - PAIN SEVERITY: 0/10
MUSCLE WEAKNESS: 1
PAIN LOCATION: GENERALIZED
SUBJECTIVE PAIN PROGRESSION: UNCHANGED
CHANGE IN APPETITE: UNCHANGED
APPETITE LEVEL: FAIR
PAIN LOCATION - PAIN QUALITY: SORE
PAIN LOCATION - PAIN FREQUENCY: INTERMITTENT
LOWEST PAIN SEVERITY IN PAST 24 HOURS: 0/10
PERSON REPORTING PAIN: PATIENT

## 2024-09-07 ASSESSMENT — ACTIVITIES OF DAILY LIVING (ADL)
CURRENT_FUNCTION: STAND BY ASSIST
AMBULATION ASSISTANCE: STAND BY ASSIST

## 2024-09-09 DIAGNOSIS — M86.171 ACUTE OSTEOMYELITIS OF RIGHT CALCANEUS (MULTI): ICD-10-CM

## 2024-09-09 DIAGNOSIS — S92.001P: ICD-10-CM

## 2024-09-09 LAB
FUNGUS SPEC CULT: NORMAL
FUNGUS SPEC FUNGUS STN: NORMAL

## 2024-09-09 RX ORDER — CYCLOBENZAPRINE HCL 10 MG
10 TABLET ORAL 3 TIMES DAILY PRN
Qty: 90 TABLET | Refills: 0 | Status: SHIPPED | OUTPATIENT
Start: 2024-09-09 | End: 2024-10-09

## 2024-09-09 NOTE — TELEPHONE ENCOUNTER
Rx Refill Request Telephone Encounter    Name:  Janette Panda  :  [id number]  Medication Name/Sig:  cyclobenazeprine 10mg    Specific Pharmacy location:  [facility] on [address]     Best number to reach patient:  [phone]     Tyra Barrientos LPN

## 2024-09-11 LAB
ACID FAST STN SPEC: NORMAL
MYCOBACTERIUM SPEC CULT: NORMAL

## 2024-09-12 ENCOUNTER — DOCUMENTATION (OUTPATIENT)
Dept: PHARMACY | Facility: CLINIC | Age: 39
End: 2024-09-12

## 2024-09-12 ENCOUNTER — HOME INFUSION (OUTPATIENT)
Dept: INFUSION THERAPY | Age: 39
End: 2024-09-12
Payer: MEDICARE

## 2024-09-12 NOTE — PROGRESS NOTES
Janette Panda is receiving ertapenem for post-op wound thru 10/4     Followed by Dr Omer.     Labs from 9/6 unremarkable     RN notes from 9/6 unremarkable     RX contacted pt, confirmed infusions going well and doses on hand thru 9/13. She is agreeable to delivery that day for another week of meds and supplies to match. Pt states allergic to tegaderm, statlock, opsite. Pharmacy to continue sending gauze pads and medipore tape. Replacing dressing change kit with just chloraprep applicator and guardIVa.     Mixing 9/12 and dispensing 9/13 with supplies to match   7x erta MB+  DOS 9/14-9/20     Follow up 9/19 check labs, send OVN

## 2024-09-13 ENCOUNTER — HOME CARE VISIT (OUTPATIENT)
Dept: HOME HEALTH SERVICES | Facility: HOME HEALTH | Age: 39
End: 2024-09-13
Payer: MEDICARE

## 2024-09-13 ENCOUNTER — LAB REQUISITION (OUTPATIENT)
Dept: LAB | Facility: HOSPITAL | Age: 39
End: 2024-09-13
Payer: MEDICARE

## 2024-09-13 DIAGNOSIS — M86.171 OTHER ACUTE OSTEOMYELITIS, RIGHT ANKLE AND FOOT (MULTI): ICD-10-CM

## 2024-09-13 DIAGNOSIS — Z47.89 ENCOUNTER FOR OTHER ORTHOPEDIC AFTERCARE: ICD-10-CM

## 2024-09-13 LAB
ALBUMIN SERPL BCP-MCNC: 3.8 G/DL (ref 3.4–5)
ALP SERPL-CCNC: 100 U/L (ref 33–110)
ALT SERPL W P-5'-P-CCNC: 10 U/L (ref 7–45)
ANION GAP SERPL CALC-SCNC: 11 MMOL/L (ref 10–20)
AST SERPL W P-5'-P-CCNC: 16 U/L (ref 9–39)
BILIRUB SERPL-MCNC: 0.3 MG/DL (ref 0–1.2)
BUN SERPL-MCNC: 5 MG/DL (ref 6–23)
CALCIUM SERPL-MCNC: 9.3 MG/DL (ref 8.6–10.3)
CHLORIDE SERPL-SCNC: 102 MMOL/L (ref 98–107)
CO2 SERPL-SCNC: 26 MMOL/L (ref 21–32)
CREAT SERPL-MCNC: 0.73 MG/DL (ref 0.5–1.05)
CRP SERPL-MCNC: 0.26 MG/DL
EGFRCR SERPLBLD CKD-EPI 2021: >90 ML/MIN/1.73M*2
ERYTHROCYTE [DISTWIDTH] IN BLOOD BY AUTOMATED COUNT: 14.1 % (ref 11.5–14.5)
GLUCOSE SERPL-MCNC: 80 MG/DL (ref 74–99)
HCT VFR BLD AUTO: 34.2 % (ref 36–46)
HGB BLD-MCNC: 10.9 G/DL (ref 12–16)
MCH RBC QN AUTO: 28 PG (ref 26–34)
MCHC RBC AUTO-ENTMCNC: 31.9 G/DL (ref 32–36)
MCV RBC AUTO: 88 FL (ref 80–100)
NRBC BLD-RTO: 0 /100 WBCS (ref 0–0)
PLATELET # BLD AUTO: 697 X10*3/UL (ref 150–450)
POTASSIUM SERPL-SCNC: 4.2 MMOL/L (ref 3.5–5.3)
PROT SERPL-MCNC: 7.1 G/DL (ref 6.4–8.2)
RBC # BLD AUTO: 3.89 X10*6/UL (ref 4–5.2)
SODIUM SERPL-SCNC: 135 MMOL/L (ref 136–145)
WBC # BLD AUTO: 8.5 X10*3/UL (ref 4.4–11.3)

## 2024-09-13 PROCEDURE — 86140 C-REACTIVE PROTEIN: CPT

## 2024-09-13 PROCEDURE — 85027 COMPLETE CBC AUTOMATED: CPT

## 2024-09-13 PROCEDURE — G0299 HHS/HOSPICE OF RN EA 15 MIN: HCPCS | Mod: HHH

## 2024-09-13 PROCEDURE — 80053 COMPREHEN METABOLIC PANEL: CPT

## 2024-09-15 VITALS
DIASTOLIC BLOOD PRESSURE: 70 MMHG | RESPIRATION RATE: 20 BRPM | HEART RATE: 89 BPM | OXYGEN SATURATION: 100 % | TEMPERATURE: 98.6 F | SYSTOLIC BLOOD PRESSURE: 110 MMHG

## 2024-09-15 SDOH — ECONOMIC STABILITY: FOOD INSECURITY: MEALS PER DAY: 3

## 2024-09-15 ASSESSMENT — ENCOUNTER SYMPTOMS
HIGHEST PAIN SEVERITY IN PAST 24 HOURS: 0/10
LOWEST PAIN SEVERITY IN PAST 24 HOURS: 0/10
PAIN LOCATION: GENERALIZED
PAIN LOCATION - PAIN SEVERITY: 0/10
PAIN: 1
PAIN LOCATION - PAIN FREQUENCY: INTERMITTENT
CHANGE IN APPETITE: UNCHANGED
PAIN SEVERITY GOAL: 0/10
APPETITE LEVEL: FAIR
PERSON REPORTING PAIN: PATIENT
SUBJECTIVE PAIN PROGRESSION: UNCHANGED
PAIN LOCATION - PAIN QUALITY: ACHY

## 2024-09-17 ENCOUNTER — HOSPITAL ENCOUNTER (OUTPATIENT)
Dept: RADIOLOGY | Facility: HOSPITAL | Age: 39
Discharge: HOME | End: 2024-09-17
Payer: MEDICARE

## 2024-09-17 ENCOUNTER — OFFICE VISIT (OUTPATIENT)
Dept: ORTHOPEDIC SURGERY | Facility: HOSPITAL | Age: 39
End: 2024-09-17
Payer: MEDICARE

## 2024-09-17 DIAGNOSIS — M86.171 ACUTE OSTEOMYELITIS OF RIGHT CALCANEUS (MULTI): ICD-10-CM

## 2024-09-17 DIAGNOSIS — M86.171 ACUTE OSTEOMYELITIS OF RIGHT CALCANEUS (MULTI): Primary | ICD-10-CM

## 2024-09-17 PROCEDURE — 73650 X-RAY EXAM OF HEEL: CPT | Mod: RT

## 2024-09-17 PROCEDURE — 99211 OFF/OP EST MAY X REQ PHY/QHP: CPT

## 2024-09-17 PROCEDURE — 1036F TOBACCO NON-USER: CPT

## 2024-09-17 PROCEDURE — 73650 X-RAY EXAM OF HEEL: CPT | Mod: RIGHT SIDE | Performed by: RADIOLOGY

## 2024-09-17 NOTE — PROGRESS NOTES
Subjective    Patient ID: Janette Panda is a 39 y.o. female.    Chief Complaint: Post-op of the Right Foot     Last Surgery: Right partial Calcanectomy // Insertion of antibiotic spacer and secondary wound closure - Right  Last Surgery Date: 8/23/2024    HPI  Patient is a 39 y.o. female who is s/p right partial calcanectomy with insertion of antibiotic spacer and secondary wound closure. Date of surgery was 8/23/2024. Patient continues to be non weight bearing on the right leg at this time and denies issues with incision. Patient continues on ASA for DVT ppx. Patient is not currently in formal PT. Patient continues with IV ertapenem through PICC line until 10/4 for mixed aerobic and anaerobic rachana intra-op cultures. Patient denies fever or chills, N/T or calf pain.     ROS: All other systems have been reviewed and are negative except as previously noted in history of present illness.     IMP:  Problem List Items Addressed This Visit       Acute osteomyelitis of right calcaneus (Multi) - Primary    Relevant Orders    XR calcaneus right 2 views     Objective   General: Alert and oriented x 3, NAD, respirations easy and unlabored with no audible wheezes, skin warm and dry, speech and dress appropriate for noted age, affect euthymic.     Musculoskeletal: Right Lower Extremity  incisions c/d/i  mild swelling to lower leg  compartments soft  no calf tenderness  sensation intact to light touch  motor intact including TA/GS/EHL  palpable DP/PT pulses 2+     X-ray: Images of right calcaneous reviewed personally by me today and reveal maintenance of alignment of partial calcanectomy with stable antibiotic spacer and no interval change.      Assessment/Plan   Encounter Diagnoses:  Acute osteomyelitis of right calcaneus (Multi)    PLAN: Patient is s/p right partial calcanectomy with insertion of antibiotic spacer and secondary wound closure. Sutures were removed at this visit. Patient is overall doing well. She is  compliant with non weight bearing on the right leg. She is not currently in physical therapy. Patient continues with IV ertapenem through PICC line until 10/4 for mixed aerobic and anaerobic rachana intra-op cultures per ID. Imaging reveals right partial calcanectomy with stable antibiotic spacer. Patient is educated that she will remain non weight bearing on the right leg until she sees us in the office in 6 weeks. At that time we will reevaluate calcaneous incision and imaging to determine weight bearing at the time. She may wear a normal shoe at this time. She can perform ankle ROM at home. She will continue with IV ertapenem treatment and follow up with ID for antibiotic and infection management. Patient will follow up in 6 weeks. Patient is in agreement with this plan. Xrays of the right calcaneous will be needed.     Orders Placed This Encounter    XR calcaneus right 2 views     No follow-ups on file.

## 2024-09-19 ENCOUNTER — HOME INFUSION (OUTPATIENT)
Dept: INFUSION THERAPY | Age: 39
End: 2024-09-19
Payer: MEDICARE

## 2024-09-19 ENCOUNTER — LAB REQUISITION (OUTPATIENT)
Dept: LAB | Facility: HOSPITAL | Age: 39
End: 2024-09-19
Payer: MEDICARE

## 2024-09-19 ENCOUNTER — DOCUMENTATION (OUTPATIENT)
Dept: PHARMACY | Facility: CLINIC | Age: 39
End: 2024-09-19

## 2024-09-19 ENCOUNTER — HOME CARE VISIT (OUTPATIENT)
Dept: HOME HEALTH SERVICES | Facility: HOME HEALTH | Age: 39
End: 2024-09-19
Payer: MEDICARE

## 2024-09-19 DIAGNOSIS — Z47.89 ENCOUNTER FOR OTHER ORTHOPEDIC AFTERCARE: ICD-10-CM

## 2024-09-19 LAB
ACID FAST STN SPEC: NORMAL
ALBUMIN SERPL BCP-MCNC: 4 G/DL (ref 3.4–5)
ALP SERPL-CCNC: 103 U/L (ref 33–110)
ALT SERPL W P-5'-P-CCNC: 10 U/L (ref 7–45)
ANION GAP SERPL CALC-SCNC: 12 MMOL/L (ref 10–20)
AST SERPL W P-5'-P-CCNC: 15 U/L (ref 9–39)
BILIRUB SERPL-MCNC: 0.3 MG/DL (ref 0–1.2)
BUN SERPL-MCNC: 5 MG/DL (ref 6–23)
CALCIUM SERPL-MCNC: 8.7 MG/DL (ref 8.6–10.3)
CHLORIDE SERPL-SCNC: 103 MMOL/L (ref 98–107)
CO2 SERPL-SCNC: 25 MMOL/L (ref 21–32)
CREAT SERPL-MCNC: 0.86 MG/DL (ref 0.5–1.05)
CRP SERPL-MCNC: 0.34 MG/DL
EGFRCR SERPLBLD CKD-EPI 2021: 88 ML/MIN/1.73M*2
ERYTHROCYTE [DISTWIDTH] IN BLOOD BY AUTOMATED COUNT: 14.2 % (ref 11.5–14.5)
GLUCOSE SERPL-MCNC: 94 MG/DL (ref 74–99)
HCT VFR BLD AUTO: 35.2 % (ref 36–46)
HGB BLD-MCNC: 11.1 G/DL (ref 12–16)
MCH RBC QN AUTO: 27.8 PG (ref 26–34)
MCHC RBC AUTO-ENTMCNC: 31.5 G/DL (ref 32–36)
MCV RBC AUTO: 88 FL (ref 80–100)
MYCOBACTERIUM SPEC CULT: NORMAL
NRBC BLD-RTO: 0 /100 WBCS (ref 0–0)
PLATELET # BLD AUTO: 575 X10*3/UL (ref 150–450)
POTASSIUM SERPL-SCNC: 4.2 MMOL/L (ref 3.5–5.3)
PROT SERPL-MCNC: 6.8 G/DL (ref 6.4–8.2)
RBC # BLD AUTO: 4 X10*6/UL (ref 4–5.2)
SODIUM SERPL-SCNC: 136 MMOL/L (ref 136–145)
WBC # BLD AUTO: 9.2 X10*3/UL (ref 4.4–11.3)

## 2024-09-19 PROCEDURE — 80053 COMPREHEN METABOLIC PANEL: CPT

## 2024-09-19 PROCEDURE — G0299 HHS/HOSPICE OF RN EA 15 MIN: HCPCS | Mod: HHH

## 2024-09-19 PROCEDURE — 85027 COMPLETE CBC AUTOMATED: CPT

## 2024-09-19 PROCEDURE — 86140 C-REACTIVE PROTEIN: CPT

## 2024-09-19 NOTE — PROGRESS NOTES
Chart review - Janette Panda is a 39 y.o. patient on home care for post-op wound infection.  Med - ertapenem 1 gm q24 thru 10/4. Dr. Omer following    Labs from 9/13 reviewed- unremarkable for this patient  RN visits reviewed- no issues noted with line or infusions    Rph call to pt, tolerating infusions well, confirmed doses in home thru 9/20. Agreeable to delivery of 2 weeks of medication and supplies to cover thru stop date 10/4. Follow up appointment scheduled for 10/3.    Pharmacy to continue sending gauze pads and medipore tape. Replacing dressing change kit with just chloraprep applicator and [employer]    Pharmacy to mix 9/19 and deliver 9/20 with supplies to match:  14x ertapenem 1 gm MB+  DOS: 9/21 - 10/4    Follow up 10/4 - plan of care Dr Omer - appt on 10/3

## 2024-09-21 VITALS
OXYGEN SATURATION: 100 % | TEMPERATURE: 98.6 F | SYSTOLIC BLOOD PRESSURE: 120 MMHG | DIASTOLIC BLOOD PRESSURE: 70 MMHG | HEART RATE: 98 BPM | RESPIRATION RATE: 20 BRPM

## 2024-09-21 SDOH — ECONOMIC STABILITY: FOOD INSECURITY: MEALS PER DAY: 3

## 2024-09-21 ASSESSMENT — ENCOUNTER SYMPTOMS
PAIN LOCATION: GENERALIZED
PAIN LOCATION - PAIN SEVERITY: 0/10
CHANGE IN APPETITE: UNCHANGED
PAIN: 1
SUBJECTIVE PAIN PROGRESSION: UNCHANGED
PAIN LOCATION - PAIN QUALITY: ACHY
LOWEST PAIN SEVERITY IN PAST 24 HOURS: 0/10
HIGHEST PAIN SEVERITY IN PAST 24 HOURS: 0/10
MUSCLE WEAKNESS: 1
APPETITE LEVEL: FAIR
PAIN SEVERITY GOAL: 0/10
PAIN LOCATION - PAIN FREQUENCY: INTERMITTENT
PERSON REPORTING PAIN: PATIENT

## 2024-09-21 ASSESSMENT — ACTIVITIES OF DAILY LIVING (ADL)
CURRENT_FUNCTION: STAND BY ASSIST
AMBULATION ASSISTANCE: STAND BY ASSIST

## 2024-09-25 LAB
ACID FAST STN SPEC: NORMAL
MYCOBACTERIUM SPEC CULT: NORMAL

## 2024-09-27 ENCOUNTER — HOME CARE VISIT (OUTPATIENT)
Dept: HOME HEALTH SERVICES | Facility: HOME HEALTH | Age: 39
End: 2024-09-27
Payer: MEDICARE

## 2024-09-29 ENCOUNTER — HOME CARE VISIT (OUTPATIENT)
Dept: HOME HEALTH SERVICES | Facility: HOME HEALTH | Age: 39
End: 2024-09-29
Payer: MEDICARE

## 2024-10-01 ENCOUNTER — HOME CARE VISIT (OUTPATIENT)
Dept: HOME HEALTH SERVICES | Facility: HOME HEALTH | Age: 39
End: 2024-10-01
Payer: MEDICARE

## 2024-10-02 LAB
ACID FAST STN SPEC: NORMAL
MYCOBACTERIUM SPEC CULT: NORMAL

## 2024-10-03 ENCOUNTER — APPOINTMENT (OUTPATIENT)
Dept: INFECTIOUS DISEASES | Facility: CLINIC | Age: 39
End: 2024-10-03
Payer: MEDICARE

## 2024-10-04 ENCOUNTER — HOME CARE VISIT (OUTPATIENT)
Dept: HOME HEALTH SERVICES | Facility: HOME HEALTH | Age: 39
End: 2024-10-04
Payer: MEDICARE

## 2024-10-04 ENCOUNTER — HOME INFUSION (OUTPATIENT)
Dept: INFUSION THERAPY | Age: 39
End: 2024-10-04
Payer: MEDICARE

## 2024-10-04 DIAGNOSIS — T81.89XA DRAINING POSTOPERATIVE WOUND, INITIAL ENCOUNTER: Primary | ICD-10-CM

## 2024-10-04 PROCEDURE — G0299 HHS/HOSPICE OF RN EA 15 MIN: HCPCS | Mod: HHH

## 2024-10-04 NOTE — PROGRESS NOTES
Received orders from Dr Omer  Stop antibiotic as ordered after today's dose. HC RN to remove line.     Order entered into Epic. Gold copy to intake. Secure chat to RN as IANI    RN scheduled for visit today.    FU 10/7 - check if line pulled and discharge from services

## 2024-10-05 VITALS
TEMPERATURE: 98.6 F | RESPIRATION RATE: 20 BRPM | DIASTOLIC BLOOD PRESSURE: 70 MMHG | SYSTOLIC BLOOD PRESSURE: 120 MMHG | HEART RATE: 80 BPM | OXYGEN SATURATION: 97 %

## 2024-10-05 ASSESSMENT — ACTIVITIES OF DAILY LIVING (ADL)
HOME_HEALTH_OASIS: 00
OASIS_M1830: 01

## 2024-10-05 ASSESSMENT — ENCOUNTER SYMPTOMS
PAIN LOCATION: GENERALIZED
PAIN SEVERITY GOAL: 0/10
PAIN: 1
HIGHEST PAIN SEVERITY IN PAST 24 HOURS: 0/10
LOWEST PAIN SEVERITY IN PAST 24 HOURS: 0/10
PAIN LOCATION - PAIN QUALITY: ACHY
SUBJECTIVE PAIN PROGRESSION: UNCHANGED
PERSON REPORTING PAIN: PATIENT
PAIN LOCATION - PAIN FREQUENCY: INTERMITTENT
PAIN LOCATION - PAIN SEVERITY: 0/10

## 2024-10-07 ENCOUNTER — HOME INFUSION (OUTPATIENT)
Dept: INFUSION THERAPY | Age: 39
End: 2024-10-07
Payer: MEDICARE

## 2024-10-07 NOTE — PROGRESS NOTES
PICC pulled 10/4 by HC RN. No further needs from [facility] pharmacy.    Pt care rep to discharge pt from [facility] and discharge chart

## 2024-10-08 ENCOUNTER — TELEPHONE (OUTPATIENT)
Dept: ADMISSION | Facility: HOSPITAL | Age: 39
End: 2024-10-08
Payer: MEDICARE

## 2024-10-08 DIAGNOSIS — M86.10: ICD-10-CM

## 2024-10-09 ENCOUNTER — TELEPHONE (OUTPATIENT)
Dept: HEMATOLOGY/ONCOLOGY | Facility: CLINIC | Age: 39
End: 2024-10-09

## 2024-10-09 LAB
ACID FAST STN SPEC: NORMAL
MYCOBACTERIUM SPEC CULT: NORMAL

## 2024-10-09 RX ORDER — SULFAMETHOXAZOLE AND TRIMETHOPRIM 800; 160 MG/1; MG/1
1 TABLET ORAL 2 TIMES DAILY
Qty: 180 TABLET | Refills: 1 | Status: SHIPPED | OUTPATIENT
Start: 2024-10-09

## 2024-10-10 ENCOUNTER — HOSPITAL ENCOUNTER (OUTPATIENT)
Dept: RADIOLOGY | Facility: HOSPITAL | Age: 39
Discharge: HOME | End: 2024-10-10
Payer: MEDICARE

## 2024-10-10 ENCOUNTER — OFFICE VISIT (OUTPATIENT)
Dept: ORTHOPEDIC SURGERY | Facility: HOSPITAL | Age: 39
End: 2024-10-10
Payer: MEDICARE

## 2024-10-10 DIAGNOSIS — M86.171 ACUTE OSTEOMYELITIS OF RIGHT CALCANEUS (MULTI): ICD-10-CM

## 2024-10-10 DIAGNOSIS — S92.001P: ICD-10-CM

## 2024-10-10 DIAGNOSIS — C34.90 ADENOCARCINOMA, LUNG, UNSPECIFIED LATERALITY (MULTI): ICD-10-CM

## 2024-10-10 PROCEDURE — 97605 NEG PRS WND THER DME<=50SQCM: CPT

## 2024-10-10 PROCEDURE — 99211 OFF/OP EST MAY X REQ PHY/QHP: CPT

## 2024-10-10 PROCEDURE — 73650 X-RAY EXAM OF HEEL: CPT | Mod: RT

## 2024-10-10 RX ORDER — CYCLOBENZAPRINE HCL 10 MG
10 TABLET ORAL 3 TIMES DAILY PRN
Qty: 90 TABLET | Refills: 0 | Status: SHIPPED | OUTPATIENT
Start: 2024-10-10 | End: 2024-11-09

## 2024-10-10 RX ORDER — OXYCODONE HYDROCHLORIDE 10 MG/1
10 TABLET ORAL EVERY 4 HOURS PRN
Qty: 90 TABLET | Refills: 0 | Status: SHIPPED | OUTPATIENT
Start: 2024-10-10

## 2024-10-10 NOTE — TELEPHONE ENCOUNTER
Received fax from [facility] requesting refill for cyclobenzaprine 10mg    Pended med to be sent

## 2024-10-10 NOTE — PROGRESS NOTES
Subjective    Patient ID: Janette Panda is a 39 y.o. female.    Chief Complaint: POV- R calcaneous wound check     Last Surgery: Right partial Calcanectomy // Insertion of antibiotic spacer and secondary wound closure - Right  Last Surgery Date: 8/23/2024    HPI  Patient is a 39 y.o. female who is s/p right partial calcanectomy with insertion of antibiotic spacer and secondary wound closure. Date of surgery was 8/23/2024. Patient presents for a right wound check. She states that she hit her heel last week and is now having pain and bloody drainage from the heel. She states that the drainage has improved since original injury and so has pain. Patient continues to be non weight bearing on the right leg at this time. Patient's IV ertapenem through PICC line is completed. Patient denies fever or chills, N/T or calf pain.     ROS: All other systems have been reviewed and are negative except as previously noted in history of present illness.     IMP:  Problem List Items Addressed This Visit       Acute osteomyelitis of right calcaneus (Multi)    Relevant Orders    XR calcaneus right 2 views (Completed)     Objective   General: Alert and oriented x 3, NAD, respirations easy and unlabored with no audible wheezes, skin warm and dry, speech and dress appropriate for noted age, affect euthymic.     Musculoskeletal: Right Lower Extremity  Proximal incision macerated with a suture still present, distal incision clean, dry, and intact  mild swelling to lower leg  compartments soft  no calf tenderness  sensation intact to light touch  motor intact including TA/GS/EHL  palpable DP/PT pulses 2+     X-ray: Images of right calcaneous reviewed personally by me today and reveal maintenance of alignment of partial calcanectomy with stable antibiotic spacer and no interval change.      Assessment/Plan   Encounter Diagnoses:  Acute osteomyelitis of right calcaneus (Multi)    PLAN: Patient is s/p right partial calcanectomy with  insertion of antibiotic spacer and secondary wound closure. Sutures were removed at this visit. Patient presents for a wound check. She states that she hit her heel last week and is now having pain and bloody drainage from the heel. She states that the drainage has improved since original injury and so has pain. She is compliant with non weight bearing on the right leg. Patient's IV ertapenem through PICC line is completed. On exam, proximal calcaneous incision macerated with a suture still present, and distal incision clean, dry, and intact. Imaging reveals right partial calcanectomy with stable antibiotic spacer. Patient is educated that her incision and skin looks macerated at the incision, but does not look open or infected. Most likely when she hit her foot, she irritated her incision causing it to bleed. Patient should do daily dry dressing changes and keep the incision dry at all times. If there are any changes in the type or color of drainage, amount, and tenderness around the incision or signs and symptoms of infection then patient should call the office. Patient is educated that she will remain non weight bearing on the right leg until she sees us in the office in 3 weeks. At that time we will reevaluate calcaneous incision and imaging to determine weight bearing at the time. She may wear a normal shoe at this time. She can perform ankle ROM at home. Patient will follow up in 3 weeks. Patient is in agreement with this plan. Xrays of the right calcaneous will be needed.     Orders Placed This Encounter    XR calcaneus right 2 views     No follow-ups on file.

## 2024-10-16 ENCOUNTER — OFFICE VISIT (OUTPATIENT)
Dept: HEMATOLOGY/ONCOLOGY | Facility: HOSPITAL | Age: 39
End: 2024-10-16
Payer: MEDICARE

## 2024-10-16 VITALS
HEART RATE: 103 BPM | RESPIRATION RATE: 18 BRPM | OXYGEN SATURATION: 99 % | DIASTOLIC BLOOD PRESSURE: 75 MMHG | TEMPERATURE: 97 F | SYSTOLIC BLOOD PRESSURE: 108 MMHG

## 2024-10-16 DIAGNOSIS — C34.11 MALIGNANT NEOPLASM OF UPPER LOBE OF RIGHT LUNG (MULTI): ICD-10-CM

## 2024-10-16 DIAGNOSIS — M04.8 OTHER AUTOINFLAMMATORY SYNDROMES (MULTI): ICD-10-CM

## 2024-10-16 LAB
ACID FAST STN SPEC: NORMAL
MYCOBACTERIUM SPEC CULT: NORMAL

## 2024-10-16 PROCEDURE — 99214 OFFICE O/P EST MOD 30 MIN: CPT | Performed by: CLINICAL NURSE SPECIALIST

## 2024-10-16 ASSESSMENT — ENCOUNTER SYMPTOMS
NECK PAIN: 0
CHILLS: 0
NAUSEA: 0
DIAPHORESIS: 0
CHANGE IN BOWEL HABIT: 0
NUMBNESS: 0
WEAKNESS: 0
ARTHRALGIAS: 0
VISUAL CHANGE: 0
FATIGUE: 0
MYALGIAS: 0
HEADACHES: 0
VERTIGO: 0
ANOREXIA: 0
SORE THROAT: 0
COUGH: 0
VOMITING: 0
FEVER: 0
JOINT SWELLING: 0
ABDOMINAL PAIN: 0
SWOLLEN GLANDS: 0

## 2024-10-16 ASSESSMENT — PAIN SCALES - GENERAL: PAINLEVEL_OUTOF10: 7

## 2024-10-16 NOTE — PROGRESS NOTES
Patient ID: Janette Panda is a 39 y.o. female.    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 PROBABLE Non-small cell lung cancer (poorly differentiated squamous?) Date of diagnosis is August 13, 2020 from a core biopsy of the left posterolateral chest wall mass.  Pathology  reports extensively necrotic epithelioid proliferation in a fibrotic background.  Tumor cells were somewhat rhabdoid in appearance with hyperchromatic, eccentric, moderately to markedly atypical nuclei some with big nuclei and eosinophilic cytoplasm.   By immunohistochemistry tumors were positive for keratin AE1/AE3, CAM 5.2, CK7, calretinin and KATIE-3 and negative for CK20 WT 1, P 40, desmin, SOX-10, CD34, TTF-1, CDX 2, PAX 8, estrogen receptor.  I and I immunostain retained nuclear expression.  The  differential diagnosis was a malignant epithelioid neoplasm including poorly differentiated carcinoma and mesothelioma.        STAGING     Stage IV disease, T1b (1.7 cm nodule within the right upper lobe), NX, M1C         CURRENT SITES OF DISEASE     RUL, left chest wall pleural based, left pleura, right infraclavicular LN, bilateral adrenal glands, right foot metatarsal bone  MRI of brain negative for malignancy        MOLECULAR GENOMICS     Insufficient tissue fotr NGS     1- CancerType ID Molecular Cancer :     Tumor type indeterminate. Cannot be excluded Squamous Cell 35%, Pancreatobiliary 31%, Ovary <5%     Ruled out with 95% confidence: adrenal, brain, cervix and endometrial adenoca, GE adeno, GIST, Germcell     Salivary, colorectal, Kidney, Hepatocellular, lung adeno, lymphoma, melanoma, mesotheliona, neuroendocrin     (including MERKEL and small cell), sarcoma, thymus, thyroid, bladder     2- TEMPUS ctDNA: KRAS Q61H - 44.5% allelic frequency                             CDKN2A H83Y Missense varialt - LOF - allelic frequency 22%                             TP53 - R175H missense variant  LOF                             ARID1A LOF allelic frequency 24%                             MSI high not detected                             MEdian allelic frquency 23.4%     3- PD-L1 IHC TPS: 90%        SERUM TUMOR MARKER     Normal CEA and CA 19.9 in September 2020        PRIOR THERAPY     1- XRT to left chest wall.  2- Palliative XRT to right foot  3- Single agent Keytruda (refused chemotherapy) based on high PD-L1 expression starting Nov 2, 2020. Clinical improvement, radiographic stable disease/pseudoprogression initially; evidence of response on imaging of April 2021.  Completed 2 years of treatment  in October 2022        CURRENT THERAPY     1- Supportive Oncology for symptom management  2- Observation        CURRENT ONCOLOGICAL PROBLEMS     1-severe chest pain related to her left chest wall mass - resolved  2-severe constipation - improved  3-scan on 11/15/2021 showed ground glass opacities, thought by radiology to represent pneumonitis- patient is asymptomatic.  Will hold treatment 11/22/2021 and re-assess in 3 weeks  3-anorexia and losing weight - much improved  4-anxiety - improved  5- Right foot pain from metastasis - improved . Right calcaneal surgery for malunion on September 8, 2022 postoperative infection.  Antibiotic treatments in December 2022. She has required to further right calcaneus wound irrigation debridement, removal  of implants operation on December 15, 2022 as well as further surgery on March 17, 2023.  She is required IV antibiotics for osteomyelitis and followed by infectious diseases.  Bone cultures with multiple organisms.  Osteomyelitis.  On chronic suppressive antibiotic therapy June 2024        HISTORY OF PRESENT ILLNESS     This is a very nice 38-year-old patient.  She went to the [facility] emergency room on August 12, 2020 with severe back pain and a large lump that was painful.  When asking her when this started she states approximately 1 to 2 weeks prior to  this  although she had been losing weight for several months and was having some on and off pain there when questioning her more carefully.  She underwent a imaging studies with a CT scan of the chest abdomen and pelvis on August 13, 2020 which demonstrated  a 1.7 cm opacity within the right upper lobe felt to be most likely extrapulmonary arising from the pleura or chest wall.  The mass had a lobulated appearance with a small focus of macroscopic fat but no associated calcification.  Furthermore a 2.7 cm  rim-enhancing hypodense lesion with surrounding inflammatory changes was seen within the posterior and lateral aspect of the left 9th-10th rib.  Within the abdomen a 2.9 cm heterogeneous nodule was seen within the right adrenal gland.  She undergoes a  CT-guided biopsy and fine-needle aspiration on August 13, 2020 showing malignant cells along the posterolateral chest wall mass on the left side.  The pathology as described above.  She was seen initially by medical oncology on September 4, 2020 but referred  to my clinic for further evaluation on the day of this visit on September 16, 2020 given the unclear site of origin and pathology.        PAST MEDICAL HISTORY     1- Appendectomy  2- Kidney stones        SOCIAL HISTORY     Patient is 35 years old, she is single, she has no children, she lives with her mother and sister, she lives in [facility].  She started at the age of 14 smoking and currently still smokes at the age of 35.  Smokes on average 2 packs a day for the  past 20 years.  She works in manufacturing        CURRENT MEDS     See med list        ALLERGIES     Tetracycline        FAMILY HISTORY     Mother had stage III ovarian cancer at the age of 51.  Grandmother on the father's side had 2 cancers one in the breast and one in the lung.    Subjective    Today Janette is here in person for a surveillance visit   after completing immunotherapy.  She continues to struggle with her osteomyelitis, issues, and  has been back on antibiotics.  She is now transitioning to oral antibiotics.  We have not seen her for a while because of this.  She continues to use oxycodone for the heel pain and surgery team depends on us to order it.  Use seems appropriate after reviewing [place]ARRS.   She is otherwise well, good energy and appetite, but does feel less well when the infection reappears.  Breathing is good.  The MRI of her heel did not show any active cancer, she was told.       Cancer  Pertinent negatives include no abdominal pain, anorexia, arthralgias, change in bowel habit, chest pain, chills, congestion, coughing, diaphoresis, fatigue, fever, headaches, joint swelling, myalgias, nausea, neck pain, numbness, rash, sore throat, swollen glands, urinary symptoms, vertigo, visual change, vomiting or weakness.       Objective    BSA: There is no height or weight on file to calculate BSA.  /75 (BP Location: Left arm, Patient Position: Sitting)   Pulse 103   Temp 36.1 °C (97 °F) (Temporal)   Resp 18   SpO2 99%        Physical Exam  Constitutional:       General: She is not in acute distress.     Appearance: Normal appearance. She is not ill-appearing, toxic-appearing or diaphoretic.   HENT:      Nose: No congestion or rhinorrhea.      Mouth/Throat:      Pharynx: No oropharyngeal exudate or posterior oropharyngeal erythema.   Eyes:      General: No scleral icterus.     Conjunctiva/sclera: Conjunctivae normal.   Cardiovascular:      Rate and Rhythm: Normal rate and regular rhythm.      Pulses: Normal pulses.      Heart sounds: Normal heart sounds. No murmur heard.     No friction rub. No gallop.   Pulmonary:      Effort: Pulmonary effort is normal. No respiratory distress.      Breath sounds: Normal breath sounds. No stridor. No wheezing, rhonchi or rales.   Chest:      Chest wall: No tenderness.   Abdominal:      General: Abdomen is flat. Bowel sounds are normal. There is no distension.      Palpations: Abdomen is soft. There  is no mass.      Tenderness: There is no abdominal tenderness. There is no guarding or rebound.   Musculoskeletal:      Right heel bandages, using crutches for ambulation       Lymphadenopathy:      Cervical: No cervical adenopathy.   Skin:     General: Skin is warm.      Coloration: Skin is not jaundiced or pale.      Findings: No bruising, erythema, lesion or rash.   Neurological:      General: No focal deficit present.      Mental Status: She is oriented to person, place, and time.      Sensory: No sensory deficit.   Psychiatric:         Mood and Affect: Mood normal.         Behavior: Behavior normal.       Performance Status:  Symptomatic; fully ambulatory      CT CHEST W IV CONTRAST;  5/7/2024   Impression   1. A 6 mm solid right upper lobe pulmonary nodule and additional 6 mm  pleural-based right upper lobe ground-glass nodule, which have been  slowly growing since prior examinations, most consistent with slow  growing neoplasms. Close attention on follow-up imaging is  recommended.  2. Similar mild upper lung predominant centrilobular and paraseptal  emphysema with findings suggestive of respiratory bronchiolitis.  3. Stable fracture deformities of left posterolateral 8th and 9th  ribs with similar mixed lytic/sclerotic appearance, likely  representing sequelae of prior radiation therapy.  4. Additional chronic stable findings as above.       Assessment/Plan     This is a very nice 38-year-old patient who was diagnosed with a aggressive malignant epithelioid neoplasm thought to be a non-small cell cancer (most likely a poorly differentiated squamous cell based on cancer type ID RNA sequencing molecular cancer ).  She was diagnosed in August 2020.  Her circulating tumor DNA demonstrated a K-issa Q61H alteration as well as T p53 and CDKN2A mutation.  There was no actionable changes but she had a high PD-L1 expression of 90% and was treated with single agent checkpoint inhibitor therapy from November  2020 to October 2022 with a complete response.  She is now almost 2 years (Oct., 2022) out from treatment without any evidence of disease progression.  Unfortunately she has had repeat episodes of osteomyelitis after heel surgery at site of her cancer.  She follows closely with ortho. And ID.  She did not have the scan we had planned for this month, so I will reorder it and have her return in a month to see Dr. Mayen.  I have asked her to also get blood work.  I will renew her oxycodone after a review of [place]ARRS - use seems appropriate.       Cancer Staging   No matching staging information was found for the patient.      Oncology History    No history exists.        Lilliam Flannery, APRN-CNS

## 2024-10-29 ENCOUNTER — APPOINTMENT (OUTPATIENT)
Dept: ORTHOPEDIC SURGERY | Facility: HOSPITAL | Age: 39
End: 2024-10-29
Payer: MEDICARE

## 2024-10-29 DIAGNOSIS — M86.171 ACUTE OSTEOMYELITIS OF RIGHT CALCANEUS (MULTI): Primary | ICD-10-CM

## 2024-10-30 PROCEDURE — RXMED WILLOW AMBULATORY MEDICATION CHARGE

## 2024-10-31 ENCOUNTER — APPOINTMENT (OUTPATIENT)
Dept: INFECTIOUS DISEASES | Facility: CLINIC | Age: 39
End: 2024-10-31
Payer: COMMERCIAL

## 2024-10-31 ENCOUNTER — PHARMACY VISIT (OUTPATIENT)
Dept: PHARMACY | Facility: CLINIC | Age: 39
End: 2024-10-31
Payer: COMMERCIAL

## 2024-10-31 VITALS
WEIGHT: 159 LBS | BODY MASS INDEX: 26.06 KG/M2 | HEART RATE: 98 BPM | SYSTOLIC BLOOD PRESSURE: 120 MMHG | TEMPERATURE: 97.8 F | DIASTOLIC BLOOD PRESSURE: 78 MMHG

## 2024-10-31 DIAGNOSIS — M86.10: Primary | ICD-10-CM

## 2024-10-31 PROCEDURE — 99214 OFFICE O/P EST MOD 30 MIN: CPT | Performed by: INTERNAL MEDICINE

## 2024-10-31 PROCEDURE — 1036F TOBACCO NON-USER: CPT | Performed by: INTERNAL MEDICINE

## 2024-10-31 ASSESSMENT — ENCOUNTER SYMPTOMS
GASTROINTESTINAL NEGATIVE: 1
EYES NEGATIVE: 1
ENDOCRINE NEGATIVE: 1
RESPIRATORY NEGATIVE: 1
ALLERGIC/IMMUNOLOGIC NEGATIVE: 1
PSYCHIATRIC NEGATIVE: 1
CARDIOVASCULAR NEGATIVE: 1
MUSCULOSKELETAL NEGATIVE: 1
CONSTITUTIONAL NEGATIVE: 1
HEMATOLOGIC/LYMPHATIC NEGATIVE: 1
NEUROLOGICAL NEGATIVE: 1

## 2024-11-05 ENCOUNTER — OFFICE VISIT (OUTPATIENT)
Dept: ORTHOPEDIC SURGERY | Facility: HOSPITAL | Age: 39
End: 2024-11-05
Payer: MEDICARE

## 2024-11-05 ENCOUNTER — HOSPITAL ENCOUNTER (OUTPATIENT)
Dept: RADIOLOGY | Facility: HOSPITAL | Age: 39
Discharge: HOME | End: 2024-11-05
Payer: MEDICARE

## 2024-11-05 DIAGNOSIS — M86.171 ACUTE OSTEOMYELITIS OF RIGHT CALCANEUS (MULTI): Primary | ICD-10-CM

## 2024-11-05 DIAGNOSIS — M86.171 ACUTE OSTEOMYELITIS OF RIGHT CALCANEUS (MULTI): ICD-10-CM

## 2024-11-05 DIAGNOSIS — T81.89XD DRAINING POSTOPERATIVE WOUND, SUBSEQUENT ENCOUNTER: ICD-10-CM

## 2024-11-05 PROCEDURE — 73650 X-RAY EXAM OF HEEL: CPT | Mod: RT

## 2024-11-05 PROCEDURE — 99211 OFF/OP EST MAY X REQ PHY/QHP: CPT

## 2024-11-05 PROCEDURE — 73650 X-RAY EXAM OF HEEL: CPT | Mod: RIGHT SIDE | Performed by: RADIOLOGY

## 2024-11-05 PROCEDURE — 1036F TOBACCO NON-USER: CPT

## 2024-11-05 RX ORDER — SULFAMETHOXAZOLE AND TRIMETHOPRIM 800; 160 MG/1; MG/1
TABLET ORAL
COMMUNITY

## 2024-11-05 RX ORDER — CALCIUM CARBONATE/VITAMIN D3 500 MG-10
1 TABLET,CHEWABLE ORAL
COMMUNITY
Start: 2024-08-23

## 2024-11-05 RX ORDER — OMEPRAZOLE 10 MG/1
CAPSULE, DELAYED RELEASE ORAL
COMMUNITY
Start: 2023-07-24

## 2024-11-05 ASSESSMENT — PAIN SCALES - GENERAL: PAINLEVEL_OUTOF10: 5 - MODERATE PAIN

## 2024-11-05 ASSESSMENT — PAIN DESCRIPTION - DESCRIPTORS: DESCRIPTORS: OTHER (COMMENT);SPASM

## 2024-11-05 ASSESSMENT — PAIN - FUNCTIONAL ASSESSMENT: PAIN_FUNCTIONAL_ASSESSMENT: 0-10

## 2024-11-05 NOTE — PROGRESS NOTES
Subjective    Patient ID: Janette Panda is a 39 y.o. female.    Chief Complaint: POV- R calcaneous wound check     Last Surgery: Right partial Calcanectomy // Insertion of antibiotic spacer and secondary wound closure - Right  Last Surgery Date: 8/23/2024    HPI  Patient is a 39 y.o. female who is s/p right partial calcanectomy with insertion of antibiotic spacer and secondary wound closure. Date of surgery was 8/23/2024. Patient states that she has some drainage from the incision but is clear/yellow. Patient continues to be non weight bearing on the right leg at this time. Patient's IV ertapenem through PICC line is completed and is on long tern bactrim per ID. Patient denies fever or chills, N/T or calf pain.     ROS: All other systems have been reviewed and are negative except as previously noted in history of present illness.     IMP:  Problem List Items Addressed This Visit    None      Objective   General: Alert and oriented x 3, NAD, respirations easy and unlabored with no audible wheezes, skin warm and dry, speech and dress appropriate for noted age, affect euthymic.     Musculoskeletal: Right Lower Extremity  Proximal incision macerated, distal incision clean, dry, and intact  mild swelling to lower leg  compartments soft  no calf tenderness  sensation intact to light touch  motor intact including TA/GS/EHL  palpable DP/PT pulses 2+     X-ray: Images of right calcaneous reviewed personally by me today and reveal maintenance of alignment of partial calcanectomy with stable antibiotic spacer and no interval change.      Assessment/Plan   Encounter Diagnoses:  No diagnosis found.    PLAN: Patient is s/p right partial calcanectomy with insertion of antibiotic spacer and secondary wound closure. Patient states that she has some drainage from the incision but is clear/yellow. Patient continues to be non weight bearing on the right leg at this time. Patient's IV ertapenem through PICC line is completed and is  on long tern bactrim per ID. On exam, proximal calcaneous incision macerated  and distal incision clean, dry, and intact. Imaging reveals right partial calcanectomy with stable antibiotic spacer. Patient is educated that her incision and skin looks macerated at the incision, but does not look open or infected. Patient should do daily dry dressing changes and keep the incision dry at all times. If there are any changes in the type or color of drainage, amount, and tenderness around the incision or signs and symptoms of infection then patient should call the office. Patient will be weight bearing as tolerated on the right leg at this time. She should not wear a shoe with a high back as to not rub against the incision. She can perform ankle ROM at home. Patient will follow up in 6 weeks. Patient is in agreement with this plan. Xrays of the right calcaneous will be needed.     No orders of the defined types were placed in this encounter.    No follow-ups on file.

## 2024-11-08 DIAGNOSIS — C34.11 MALIGNANT NEOPLASM OF UPPER LOBE, RIGHT BRONCHUS OR LUNG: Primary | ICD-10-CM

## 2024-11-11 ENCOUNTER — LAB (OUTPATIENT)
Dept: LAB | Facility: HOSPITAL | Age: 39
End: 2024-11-11
Payer: MEDICARE

## 2024-11-11 ENCOUNTER — HOSPITAL ENCOUNTER (OUTPATIENT)
Dept: RADIOLOGY | Facility: HOSPITAL | Age: 39
Discharge: HOME | End: 2024-11-11
Payer: MEDICARE

## 2024-11-11 ENCOUNTER — TELEPHONE (OUTPATIENT)
Dept: ADMISSION | Facility: HOSPITAL | Age: 39
End: 2024-11-11
Payer: MEDICARE

## 2024-11-11 DIAGNOSIS — C34.11 MALIGNANT NEOPLASM OF UPPER LOBE OF RIGHT LUNG (MULTI): ICD-10-CM

## 2024-11-11 DIAGNOSIS — C34.11 MALIGNANT NEOPLASM OF UPPER LOBE, RIGHT BRONCHUS OR LUNG: ICD-10-CM

## 2024-11-11 DIAGNOSIS — M04.8 OTHER AUTOINFLAMMATORY SYNDROMES (MULTI): ICD-10-CM

## 2024-11-11 DIAGNOSIS — C34.90 ADENOCARCINOMA, LUNG, UNSPECIFIED LATERALITY (MULTI): ICD-10-CM

## 2024-11-11 DIAGNOSIS — M86.171 ACUTE OSTEOMYELITIS OF RIGHT CALCANEUS (MULTI): ICD-10-CM

## 2024-11-11 LAB
ALBUMIN SERPL BCP-MCNC: 4.4 G/DL (ref 3.4–5)
ALP SERPL-CCNC: 93 U/L (ref 33–110)
ALT SERPL W P-5'-P-CCNC: 7 U/L (ref 7–45)
ANION GAP SERPL CALC-SCNC: 11 MMOL/L (ref 10–20)
AST SERPL W P-5'-P-CCNC: 13 U/L (ref 9–39)
BASOPHILS # BLD AUTO: 0.02 X10*3/UL (ref 0–0.1)
BASOPHILS NFR BLD AUTO: 0.2 %
BILIRUB SERPL-MCNC: 0.5 MG/DL (ref 0–1.2)
BUN SERPL-MCNC: 4 MG/DL (ref 6–23)
CALCIUM SERPL-MCNC: 9.4 MG/DL (ref 8.6–10.3)
CHLORIDE SERPL-SCNC: 103 MMOL/L (ref 98–107)
CO2 SERPL-SCNC: 27 MMOL/L (ref 21–32)
CORTIS SERPL-MCNC: 11.2 UG/DL (ref 2.5–20)
CREAT SERPL-MCNC: 0.72 MG/DL (ref 0.5–1.05)
CRP SERPL-MCNC: 0.54 MG/DL
EGFRCR SERPLBLD CKD-EPI 2021: >90 ML/MIN/1.73M*2
EOSINOPHIL # BLD AUTO: 0.07 X10*3/UL (ref 0–0.7)
EOSINOPHIL NFR BLD AUTO: 0.8 %
ERYTHROCYTE [DISTWIDTH] IN BLOOD BY AUTOMATED COUNT: 14.4 % (ref 11.5–14.5)
GLUCOSE SERPL-MCNC: 94 MG/DL (ref 74–99)
HCT VFR BLD AUTO: 36.6 % (ref 36–46)
HGB BLD-MCNC: 12 G/DL (ref 12–16)
IMM GRANULOCYTES # BLD AUTO: 0.01 X10*3/UL (ref 0–0.7)
IMM GRANULOCYTES NFR BLD AUTO: 0.1 % (ref 0–0.9)
LYMPHOCYTES # BLD AUTO: 3.75 X10*3/UL (ref 1.2–4.8)
LYMPHOCYTES NFR BLD AUTO: 44.3 %
MCH RBC QN AUTO: 27.9 PG (ref 26–34)
MCHC RBC AUTO-ENTMCNC: 32.8 G/DL (ref 32–36)
MCV RBC AUTO: 85 FL (ref 80–100)
MONOCYTES # BLD AUTO: 0.61 X10*3/UL (ref 0.1–1)
MONOCYTES NFR BLD AUTO: 7.2 %
NEUTROPHILS # BLD AUTO: 4 X10*3/UL (ref 1.2–7.7)
NEUTROPHILS NFR BLD AUTO: 47.4 %
NRBC BLD-RTO: 0 /100 WBCS (ref 0–0)
PLATELET # BLD AUTO: 552 X10*3/UL (ref 150–450)
POTASSIUM SERPL-SCNC: 3.8 MMOL/L (ref 3.5–5.3)
PROT SERPL-MCNC: 7.6 G/DL (ref 6.4–8.2)
RBC # BLD AUTO: 4.3 X10*6/UL (ref 4–5.2)
SODIUM SERPL-SCNC: 137 MMOL/L (ref 136–145)
TSH SERPL-ACNC: 0.76 MIU/L (ref 0.44–3.98)
WBC # BLD AUTO: 8.5 X10*3/UL (ref 4.4–11.3)

## 2024-11-11 PROCEDURE — 82024 ASSAY OF ACTH: CPT

## 2024-11-11 PROCEDURE — 2550000001 HC RX 255 CONTRASTS: Performed by: CLINICAL NURSE SPECIALIST

## 2024-11-11 PROCEDURE — 71260 CT THORAX DX C+: CPT

## 2024-11-11 PROCEDURE — 82533 TOTAL CORTISOL: CPT

## 2024-11-11 PROCEDURE — 85025 COMPLETE CBC W/AUTO DIFF WBC: CPT

## 2024-11-11 PROCEDURE — 84443 ASSAY THYROID STIM HORMONE: CPT

## 2024-11-11 PROCEDURE — 84075 ASSAY ALKALINE PHOSPHATASE: CPT

## 2024-11-11 PROCEDURE — 71260 CT THORAX DX C+: CPT | Performed by: STUDENT IN AN ORGANIZED HEALTH CARE EDUCATION/TRAINING PROGRAM

## 2024-11-11 PROCEDURE — 86140 C-REACTIVE PROTEIN: CPT

## 2024-11-11 RX ORDER — OXYCODONE HYDROCHLORIDE 10 MG/1
10 TABLET ORAL EVERY 4 HOURS PRN
Qty: 90 TABLET | Refills: 0 | Status: SHIPPED | OUTPATIENT
Start: 2024-11-11

## 2024-11-11 NOTE — TELEPHONE ENCOUNTER
Pt is requesting a refill of oxycodone 10mg q4 prn, #90.  Last prescription was written 10/10/24.  Preferred pharmacy is [address].

## 2024-11-12 LAB — ACTH PLAS-MCNC: 17.4 PG/ML (ref 7.2–63.3)

## 2024-11-19 ENCOUNTER — APPOINTMENT (OUTPATIENT)
Dept: ORTHOPEDIC SURGERY | Facility: HOSPITAL | Age: 39
End: 2024-11-19
Payer: COMMERCIAL

## 2024-11-20 ENCOUNTER — OFFICE VISIT (OUTPATIENT)
Dept: HEMATOLOGY/ONCOLOGY | Facility: HOSPITAL | Age: 39
End: 2024-11-20
Payer: MEDICARE

## 2024-11-20 VITALS
RESPIRATION RATE: 18 BRPM | SYSTOLIC BLOOD PRESSURE: 113 MMHG | OXYGEN SATURATION: 100 % | TEMPERATURE: 97 F | DIASTOLIC BLOOD PRESSURE: 66 MMHG | BODY MASS INDEX: 25.76 KG/M2 | WEIGHT: 157.19 LBS | HEART RATE: 88 BPM

## 2024-11-20 DIAGNOSIS — C34.11 MALIGNANT NEOPLASM OF UPPER LOBE OF RIGHT LUNG (MULTI): ICD-10-CM

## 2024-11-20 PROCEDURE — 99215 OFFICE O/P EST HI 40 MIN: CPT | Performed by: INTERNAL MEDICINE

## 2024-11-20 PROCEDURE — 1036F TOBACCO NON-USER: CPT | Performed by: INTERNAL MEDICINE

## 2024-11-20 ASSESSMENT — ENCOUNTER SYMPTOMS
CHILLS: 0
VERTIGO: 0
ANOREXIA: 0
ARTHRALGIAS: 0
SORE THROAT: 0
VISUAL CHANGE: 0
FEVER: 0
JOINT SWELLING: 0
ABDOMINAL PAIN: 0
CHANGE IN BOWEL HABIT: 0
HEADACHES: 0
NUMBNESS: 0
SWOLLEN GLANDS: 0
FATIGUE: 0
DIAPHORESIS: 0
WEAKNESS: 0
VOMITING: 0
NECK PAIN: 0
MYALGIAS: 0
COUGH: 0
NAUSEA: 0

## 2024-11-20 ASSESSMENT — PAIN SCALES - GENERAL: PAINLEVEL_OUTOF10: 7

## 2024-11-20 NOTE — PROGRESS NOTES
Patient ID: Janette Panda is a 39 y.o. female.    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 PROBABLE Non-small cell lung cancer (poorly differentiated squamous?) Date of diagnosis is August 13, 2020 from a core biopsy of the left posterolateral chest wall mass.  Pathology  reports extensively necrotic epithelioid proliferation in a fibrotic background.  Tumor cells were somewhat rhabdoid in appearance with hyperchromatic, eccentric, moderately to markedly atypical nuclei some with big nuclei and eosinophilic cytoplasm.   By immunohistochemistry tumors were positive for keratin AE1/AE3, CAM 5.2, CK7, calretinin and KATIE-3 and negative for CK20 WT 1, P 40, desmin, SOX-10, CD34, TTF-1, CDX 2, PAX 8, estrogen receptor.  I and I immunostain retained nuclear expression.  The  differential diagnosis was a malignant epithelioid neoplasm including poorly differentiated carcinoma and mesothelioma.        STAGING     Stage IV disease, T1b (1.7 cm nodule within the right upper lobe), NX, M1C         CURRENT SITES OF DISEASE     RUL, left chest wall pleural based, left pleura, right infraclavicular LN, bilateral adrenal glands, right foot metatarsal bone  MRI of brain negative for malignancy        MOLECULAR GENOMICS     Insufficient tissue fotr NGS     1- CancerType ID Molecular Cancer :     Tumor type indeterminate. Cannot be excluded Squamous Cell 35%, Pancreatobiliary 31%, Ovary <5%     Ruled out with 95% confidence: adrenal, brain, cervix and endometrial adenoca, GE adeno, GIST, Germcell     Salivary, colorectal, Kidney, Hepatocellular, lung adeno, lymphoma, melanoma, mesotheliona, neuroendocrin     (including MERKEL and small cell), sarcoma, thymus, thyroid, bladder     2- TEMPUS ctDNA: KRAS Q61H - 44.5% allelic frequency                             CDKN2A H83Y Missense varialt - LOF - allelic frequency 22%                             TP53 - R175H missense variant  LOF                             ARID1A LOF allelic frequency 24%                             MSI high not detected                             MEdian allelic frquency 23.4%     3- PD-L1 IHC TPS: 90%        SERUM TUMOR MARKER     Normal CEA and CA 19.9 in September 2020        PRIOR THERAPY     1- XRT to left chest wall.  2- Palliative XRT to right foot  3- Single agent Keytruda (refused chemotherapy) based on high PD-L1 expression starting Nov 2, 2020. Clinical improvement, radiographic stable disease/pseudoprogression initially; evidence of response on imaging of April 2021.  Completed 2 years of treatment  in October 2022        CURRENT THERAPY     1- Supportive Oncology for symptom management  2- Observation  3-growing 8 mm nodule right upper lobe November 2024.  Consider follow-up CT in 3 months versus SBRT.        CURRENT ONCOLOGICAL PROBLEMS     1-severe chest pain related to her left chest wall mass - resolved  2-severe constipation - improved  3-scan on 11/15/2021 showed ground glass opacities, thought by radiology to represent pneumonitis- patient is asymptomatic.  Will hold treatment 11/22/2021 and re-assess in 3 weeks  3-anorexia and losing weight - much improved  4-anxiety - improved  5- Right foot pain from metastasis - improved . Right calcaneal surgery for malunion on September 8, 2022 postoperative infection.  Antibiotic treatments in December 2022. She has required to further right calcaneus wound irrigation debridement, removal  of implants operation on December 15, 2022 as well as further surgery on March 17, 2023.  She is required IV antibiotics for osteomyelitis and followed by infectious diseases.  Bone cultures with multiple organisms.  Osteomyelitis.  On chronic suppressive antibiotic therapy June 2024        HISTORY OF PRESENT ILLNESS     This is a very nice 38-year-old patient.  She went to the [facility] emergency room on August 12, 2020 with severe back pain and a large lump  that was painful.  When asking her when this started she states approximately 1 to 2 weeks prior to  this although she had been losing weight for several months and was having some on and off pain there when questioning her more carefully.  She underwent a imaging studies with a CT scan of the chest abdomen and pelvis on August 13, 2020 which demonstrated  a 1.7 cm opacity within the right upper lobe felt to be most likely extrapulmonary arising from the pleura or chest wall.  The mass had a lobulated appearance with a small focus of macroscopic fat but no associated calcification.  Furthermore a 2.7 cm  rim-enhancing hypodense lesion with surrounding inflammatory changes was seen within the posterior and lateral aspect of the left 9th-10th rib.  Within the abdomen a 2.9 cm heterogeneous nodule was seen within the right adrenal gland.  She undergoes a  CT-guided biopsy and fine-needle aspiration on August 13, 2020 showing malignant cells along the posterolateral chest wall mass on the left side.  The pathology as described above.  She was seen initially by medical oncology on September 4, 2020 but referred  to my clinic for further evaluation on the day of this visit on September 16, 2020 given the unclear site of origin and pathology.        PAST MEDICAL HISTORY     1- Appendectomy  2- Kidney stones        SOCIAL HISTORY     Patient is 35 years old, she is single, she has no children, she lives with her mother and sister, she lives in [facility].  She started at the age of 14 smoking and currently still smokes at the age of 35.  Smokes on average 2 packs a day for the  past 20 years.  She works in manufacturing        CURRENT MEDS     See med list        ALLERGIES     Tetracycline        FAMILY HISTORY     Mother had stage III ovarian cancer at the age of 51.  Grandmother on the father's side had 2 cancers one in the breast and one in the lung.    Subjective    Cancer  Pertinent negatives include no abdominal  pain, anorexia, arthralgias, change in bowel habit, chest pain, chills, congestion, coughing, diaphoresis, fatigue, fever, headaches, joint swelling, myalgias, nausea, neck pain, numbness, rash, sore throat, swollen glands, urinary symptoms, vertigo, visual change, vomiting or weakness.     Chronic right foot pain, on chronic Bactrim therapy for her osteomyelitis.    Objective    BSA: 1.82 meters squared  /66 (BP Location: Left arm, Patient Position: Sitting, BP Cuff Size: Adult)   Pulse 88   Temp 36.1 °C (97 °F) (Temporal)   Resp 18   Wt 71.3 kg (157 lb 3 oz)   SpO2 100%   BMI 25.76 kg/m²      Physical Exam  Constitutional:       General: She is not in acute distress.     Appearance: Normal appearance. She is not ill-appearing, toxic-appearing or diaphoretic.   HENT:      Nose: No congestion or rhinorrhea.      Mouth/Throat:      Pharynx: No oropharyngeal exudate or posterior oropharyngeal erythema.   Eyes:      General: No scleral icterus.     Conjunctiva/sclera: Conjunctivae normal.   Cardiovascular:      Rate and Rhythm: Normal rate and regular rhythm.      Pulses: Normal pulses.      Heart sounds: Normal heart sounds. No murmur heard.     No friction rub. No gallop.   Pulmonary:      Effort: Pulmonary effort is normal. No respiratory distress.      Breath sounds: Normal breath sounds. No stridor. No wheezing, rhonchi or rales.   Chest:      Chest wall: No tenderness.   Abdominal:      General: Abdomen is flat. Bowel sounds are normal. There is no distension.      Palpations: Abdomen is soft. There is no mass.      Tenderness: There is no abdominal tenderness. There is no guarding or rebound.   Musculoskeletal:      Cervical back: No tenderness.      Right lower leg: No edema.      Left lower leg: No edema.   Lymphadenopathy:      Cervical: No cervical adenopathy.   Neurological:      General: No focal deficit present.      Mental Status: She is oriented to person, place, and time.   Psychiatric:          Mood and Affect: Mood normal.         Behavior: Behavior normal.         Performance Status:  Symptomatic; fully ambulatory     Latest Reference Range & Units 11/11/24 12:22   GLUCOSE 74 - 99 mg/dL 94   SODIUM 136 - 145 mmol/L 137   POTASSIUM 3.5 - 5.3 mmol/L 3.8   CHLORIDE 98 - 107 mmol/L 103   Bicarbonate 21 - 32 mmol/L 27   Anion Gap 10 - 20 mmol/L 11   Blood Urea Nitrogen 6 - 23 mg/dL 4 (L)   Creatinine 0.50 - 1.05 mg/dL 0.72   EGFR >60 mL/min/1.73m*2 >90   Calcium 8.6 - 10.3 mg/dL 9.4   Albumin 3.4 - 5.0 g/dL 4.4   Alkaline Phosphatase 33 - 110 U/L 93   ALT 7 - 45 U/L 7   AST 9 - 39 U/L 13   Bilirubin Total 0.0 - 1.2 mg/dL 0.5   Total Protein 6.4 - 8.2 g/dL 7.6   C-Reactive Protein <1.00 mg/dL 0.54   CORTISOL 2.5 - 20.0 ug/dL 11.2   Thyroid Stimulating Hormone 0.44 - 3.98 mIU/L 0.76   Adrenocorticotropic Hormone (ACTH) 7.2 - 63.3 pg/mL 17.4   WBC 4.4 - 11.3 x10*3/uL 8.5   nRBC 0.0 - 0.0 /100 WBCs 0.0   RBC 4.00 - 5.20 x10*6/uL 4.30   HEMOGLOBIN 12.0 - 16.0 g/dL 12.0   HEMATOCRIT 36.0 - 46.0 % 36.6   MCV 80 - 100 fL 85   MCH 26.0 - 34.0 pg 27.9   MCHC 32.0 - 36.0 g/dL 32.8   RED CELL DISTRIBUTION WIDTH 11.5 - 14.5 % 14.4   Platelets 150 - 450 x10*3/uL 552 (H)   Neutrophils % 40.0 - 80.0 % 47.4   Immature Granulocytes %, Automated 0.0 - 0.9 % 0.1   Lymphocytes % 13.0 - 44.0 % 44.3   Monocytes % 2.0 - 10.0 % 7.2   Eosinophils % 0.0 - 6.0 % 0.8   Basophils % 0.0 - 2.0 % 0.2   Neutrophils Absolute 1.20 - 7.70 x10*3/uL 4.00   Immature Granulocytes Absolute, Automated 0.00 - 0.70 x10*3/uL 0.01   Lymphocytes Absolute 1.20 - 4.80 x10*3/uL 3.75   Monocytes Absolute 0.10 - 1.00 x10*3/uL 0.61   Eosinophils Absolute 0.00 - 0.70 x10*3/uL 0.07   Basophils Absolute 0.00 - 0.10 x10*3/uL 0.02   (L): Data is abnormally low  (H): Data is abnormally high    CT CHEST W IV CONTRAST; 11/11/2024   IMPRESSION:  1. Progressive enlargement of a spiculated solid right upper lobe  pulmonary nodule, now measuring 0.8 x 0.5 cm, most  consistent with  enlarging primary lung neoplasm. Thoracic surgery consult is  recommended.  2. A pleural-based ground-glass opacity within the right upper lobe  measuring 5 mm is stable since recent prior, but slightly more  conspicuous as compared to remote CT scans from 2021. A slow growing  neoplasm is not excluded and continued attention on follow-up imaging  is recommended.  3. Similar posttreatment changes within the left lower lobe without  evidence of local disease recurrence.  4. Similar mild emphysematous changes with mild diffuse bronchial  wall thickening again suggestive of chronic bronchitis.      Assessment/Plan     There is no evidence of recurrence of the patient's initial metastatic poorly differentiated carcinoma (most likely poorly differentiated squamous cell).  Her diagnosis dates back to August 2020 and she has had a complete response to checkpoint immunotherapy.  She had an initial high PD-L1 expression of 90%.  She has been off systemic therapy since October 2022.    She now has a growing right upper lobe nodule that measures 8 mm.  I discussed this with her.  Options include follow-up CT scan in 3 months versus SBRT.  I will present the case at the tumor board and determine if they prefer to proceed with SBRT now versus a follow-up CT scan.  This most likely represents a new primary as opposed to recurrence of her initial cancer.      Cancer Staging   No matching staging information was found for the patient.      Oncology History    No history exists.                 Thaddeus Mayen MD

## 2024-11-21 ENCOUNTER — APPOINTMENT (OUTPATIENT)
Dept: INFECTIOUS DISEASES | Facility: CLINIC | Age: 39
End: 2024-11-21
Payer: MEDICARE

## 2024-11-21 DIAGNOSIS — M86.171 ACUTE OSTEOMYELITIS OF RIGHT CALCANEUS (MULTI): ICD-10-CM

## 2024-11-21 DIAGNOSIS — S92.001P: ICD-10-CM

## 2024-11-21 RX ORDER — CYCLOBENZAPRINE HCL 10 MG
10 TABLET ORAL 3 TIMES DAILY PRN
Qty: 90 TABLET | Refills: 0 | Status: SHIPPED | OUTPATIENT
Start: 2024-11-21 | End: 2024-12-21

## 2024-11-21 NOTE — TELEPHONE ENCOUNTER
Rx Refill Request Telephone Encounter    Received fax from patient's pharm for the following     Name:  Janette GRIFFIN Panda  :  [id number]  Medication Name/Sig:  cyclobenzaprine 10mg  Specific Pharmacy location:  [facility]  Date of last appointment:  2024  Date of next appointment:  2024  Best number to reach patient:  [phone]     Tyra Barrientos LPN

## 2024-12-16 ENCOUNTER — TELEPHONE (OUTPATIENT)
Dept: ADMISSION | Facility: HOSPITAL | Age: 39
End: 2024-12-16
Payer: MEDICARE

## 2024-12-16 DIAGNOSIS — C34.90 ADENOCARCINOMA, LUNG, UNSPECIFIED LATERALITY (MULTI): ICD-10-CM

## 2024-12-16 RX ORDER — OXYCODONE HYDROCHLORIDE 10 MG/1
10 TABLET ORAL EVERY 4 HOURS PRN
Qty: 90 TABLET | Refills: 0 | Status: SHIPPED | OUTPATIENT
Start: 2024-12-16

## 2024-12-16 NOTE — TELEPHONE ENCOUNTER
Refill Request  Oxycodone 10mg every 4 hours PRN    Preferred Pharmacy  [facility] [id number] [place]

## 2024-12-16 NOTE — TELEPHONE ENCOUNTER
Patient calling back to check on status of refill request. Advised it may take 24-48 hrs for refill requests.

## 2025-01-17 ENCOUNTER — TELEPHONE (OUTPATIENT)
Dept: ADMISSION | Facility: HOSPITAL | Age: 40
End: 2025-01-17
Payer: MEDICARE

## 2025-01-17 DIAGNOSIS — C34.90 ADENOCARCINOMA, LUNG, UNSPECIFIED LATERALITY (MULTI): ICD-10-CM

## 2025-01-17 RX ORDER — OXYCODONE HYDROCHLORIDE 10 MG/1
10 TABLET ORAL EVERY 4 HOURS PRN
Qty: 90 TABLET | Refills: 0 | Status: SHIPPED | OUTPATIENT
Start: 2025-01-17

## 2025-01-17 NOTE — TELEPHONE ENCOUNTER
Refill request for Oxycodoone to [facility]   Suturegard Intro: Intraoperative tissue expansion was performed, utilizing the SUTUREGARD device, in order to reduce wound tension.

## 2025-02-03 ENCOUNTER — APPOINTMENT (OUTPATIENT)
Dept: INFECTIOUS DISEASES | Facility: CLINIC | Age: 40
End: 2025-02-03
Payer: MEDICARE

## 2025-02-03 DIAGNOSIS — M86.10: Primary | ICD-10-CM

## 2025-02-03 PROCEDURE — 99214 OFFICE O/P EST MOD 30 MIN: CPT | Performed by: INTERNAL MEDICINE

## 2025-02-03 ASSESSMENT — ENCOUNTER SYMPTOMS
NEUROLOGICAL NEGATIVE: 1
CARDIOVASCULAR NEGATIVE: 1
ENDOCRINE NEGATIVE: 1
HEMATOLOGIC/LYMPHATIC NEGATIVE: 1
CONSTITUTIONAL NEGATIVE: 1
RESPIRATORY NEGATIVE: 1
MUSCULOSKELETAL NEGATIVE: 1
ALLERGIC/IMMUNOLOGIC NEGATIVE: 1
EYES NEGATIVE: 1
GASTROINTESTINAL NEGATIVE: 1
PSYCHIATRIC NEGATIVE: 1

## 2025-02-03 NOTE — PROGRESS NOTES
Infectious Diseases Clinic Follow-up:    Reason for Visit: No chief complaint on file.    History of Current Issue  Janette Panda is a 39 y.o. year old female         Here for routine follow up.    PAST MEDICAL HISTORY:  Past Medical History:   Diagnosis Date    Acute osteomyelitis of right calcaneus (Multi)     Anemia     Anxiety     Arthritis     Asthma     Metastatic lung cancer (metastasis from lung to other site) (Multi)     Personal history of other diseases of the respiratory system 01/19/2021    History of sinus problem    PONV (postoperative nausea and vomiting)     PTSD (post-traumatic stress disorder)        PAST SURGICAL HISTORY:  Past Surgical History:   Procedure Laterality Date    APPENDECTOMY      OTHER SURGICAL HISTORY  09/15/2020    I & D right calcaneus       ALLERGIES:    Allergies   Allergen Reactions    Amitriptyline Itching and Other     nausea    Tetracyclines Nausea/vomiting     Vomiting     Adhesive Tape-Silicones Itching    Latex Unknown    Other Itching and Other     Tegaderm Absorbent Dressing -itching  [id number] Standard - blisters and rash    Tramadol Other     N/V       MEDICATIONS:      Current Outpatient Medications:     budesonide-formoteroL (Symbicort) 80-4.5 mcg/actuation inhaler, Inhale 2 puffs 2 times a day., Disp: , Rfl:     calcium carbonate-vitamin D3 (Oscal-500) 500 mg-10 mcg (400 unit) tablet, Take 1 tablet by mouth 2 times a day., Disp: 60 tablet, Rfl: 11    gabapentin (Neurontin) 300 mg capsule, Take 1 capsule (300 mg) by mouth 3 times a day as needed (nerve pain)., Disp: 270 capsule, Rfl: 3    montelukast (Singulair) 10 mg tablet, Take 1 tablet (10 mg) by mouth once daily., Disp: , Rfl:     naloxone (Narcan) 4 mg/0.1 mL nasal spray, Administer 1 spray (4 mg) into affected nostril(s) if needed for opioid reversal. May repeat every 2-3 minutes if needed, alternating nostrils, until medical assistance becomes available., Disp: 2 each, Rfl: 0    oxyCODONE (Roxicodone) 10  mg immediate release tablet, Take 1 tablet (10 mg) by mouth every 4 hours if needed for severe pain (7 - 10)., Disp: 90 tablet, Rfl: 0    phenylephrine (Sudafed PE) 10 mg tablet, Take 1 tablet (10 mg) by mouth every 4 hours if needed., Disp: , Rfl:     sulfamethoxazole-trimethoprim (Bactrim DS) 800-160 mg tablet, TAKE 1 TABLET BY MOUTH TWICE A DAY, Disp: 180 tablet, Rfl: 1    Tylenol Extra Strength 500 mg tablet, Take 1-2 tablets (500-1,000 mg) by mouth every 8 hours if needed for mild pain (1 - 3) or moderate pain (4 - 6)., Disp: , Rfl:     albuterol 90 mcg/actuation inhaler, Inhale 1 puff if needed. (Patient not taking: Reported on 2/3/2025), Disp: , Rfl:     calcium carbonate-vitamin D3 500 mg-10 mcg (400 unit) chewable tablet, Chew 1 tablet every 12 hours. (Patient not taking: Reported on 2/3/2025), Disp: , Rfl:     cyclobenzaprine (Flexeril) 10 mg tablet, Take 1 tablet (10 mg) by mouth 3 times a day as needed for muscle spasms., Disp: 90 tablet, Rfl: 0    ertapenem 1,000 mg in sodium chloride 0.9% 50 mL IV, Infuse 1,000 mg at 100 mL/hr over 30 minutes into a venous catheter once every 24 hours. (Patient not taking: Reported on 11/20/2024), Disp: 40 each, Rfl: 0    heparin flush (heparin LockFlush,Porcine,,PF,) 10 unit/mL injection, Infuse 5 mL (50 Units) into a venous catheter once daily. (Patient not taking: Reported on 2/3/2025), Disp: , Rfl:     omeprazole (PriLOSEC) 10 mg DR capsule, Take by mouth. (Patient not taking: Reported on 2/3/2025), Disp: , Rfl:     sodium chloride 0.9% (Normal Saline Flush) flush, Infuse 10 mL into a venous catheter once daily. (Patient not taking: Reported on 2/3/2025), Disp: , Rfl:     sulfamethoxazole-trimethoprim (Bactrim DS) 800-160 mg tablet, , Disp: , Rfl:     Symbicort 80-4.5 mcg/actuation inhaler, Symbicort 80-4.5 MCG/ACT Inhalation Aerosol Quantity: 10  Refills: 0  Start: 28-Dec-2022 (Patient not taking: Reported on 2/3/2025), Disp: , Rfl:     REVIEW OF SYSTEMS:  Review  of Systems   Constitutional: Negative.    HENT: Negative.     Eyes: Negative.    Respiratory: Negative.     Cardiovascular: Negative.    Gastrointestinal: Negative.    Endocrine: Negative.    Genitourinary: Negative.    Musculoskeletal: Negative.    Skin: Negative.    Allergic/Immunologic: Negative.    Neurological: Negative.    Hematological: Negative.    Psychiatric/Behavioral: Negative.         PHYSICAL EXAMINATION:       Visit Vitals  Smoking Status Former        EXAM:   N/A      DATA:    Microbiology:       -12/15/22: BCX with CoNS in 1/4 vials, determined to be likely contaminant  -12/15/22: calcaneal wound cx grew Serratia, Pseudomonas (both S=cipro, cefepime)  -3/16/23: MRSA nares negative  -3/16/23: calcaneal wound cx (3/3 sets) grew Serratia (S=Cipro, cefepime, levo; R=cefazolin)  -7/16/23: blood cx x2 sets â€“ NGTD  -7/17/23: blood cx x2 sets â€“ NGTD  -7/19/23: calcaneal wound (1/3 sets) gram stain showing gram positive cocci, pending cx        ASSESSMENT / RECOMMENDATIONS:  Patient is a 37-year-old female with PMHx lung malignancy w/mets to R ankle c/b recurrent osteomyelitis s/p right Achilles tendon repair and multiple I&D/saucerizations, now s/p R calc I&D and saucerization on 7/19. ID is consulted for long term antibiotic recommendations.     Antibiotics  Cefepime (8 week course, completed 2/23/23)  Cefepime (3/19/23 â€“ 4/6/23)  Levofloxacin (4/6/23 - completed 4/27/23)  Ceftriaxone (7/16)  Vancomycin (7/16 â€“ 7/17)  Zosyn (7/17)  Cefazolin (7/19 - )     Assessment     #Acute on Chronic Osteomyelitis s/p multiple surgical interventions and abx courses  #S/p R calcaneal I&D and saucerization 7/19     :: Followed outpatient by Dr. Omer  :: XR ankle foot (7/17) s/f extensive osteolysis which appears slightly progressed relative to prior calcaneal radiography and markedly progressed relative to priorfoot radiography, compatible with worsening acute on chronic osteomyelitis.  Pt initially had R foot  pain from metastasis s/p palliative RT and right calcaneal surgery for malunion in 9/2022. This was complicated by serratia and pseudomonas osteomyelitis in 12/2022, which was treated with I&D, saucerization, and 2 months of cefepime. 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 Now currently presenting with acute on chronic osteomyelitis, drainage from wound, s/p OR on 7/19 for I&D and R calcaneal saucerization.     Gram stain of wound (7/19) positive for gram positive cocci, pending culture results. Unclear what organisms may be causing patient's osteomyelitis/STI. Possible that infection is polymicrobial. Also cannot r/o persistent infection by Serratia and Pseudomonas given previous positive bone cultures and hx of persistent serratia in calcaneal culture after 8 weeks of IV antibiotics. For now, pending culture results to determine optimal abx regimen and continuing broad spectrum abx coverage. Given patient's refusal of PICC placement and possibility of leaving AMA, would start bactrim OP.        7/21/23  1) Continue vanc/zosyn pending cultures  2) Advise that patient stays until cultures have resulted  3) If there continues to be difficulty establishing access, patient leaves AMA, or pt discharged early, start bactrim DS BID for 4 weeks  4) Follow-up with Dr. Omer (pt's ID provider) in 4 weeks to determine whether abx should be continued. We will schedule appointment.     UPDATE 10/26/2023  Tolerating Abxs well but healing not yet complete  Will need at least another 6 weeks of ABXs     UPDATE 4/25/2024  Doing well on oral ABXs     Update 10/31/24  Doing well on oral antibiotics without issues. Some drainage was noted on bandage, but per patient has improved. Reviewed recent imaging and labs.      At this point, patient will continue  oral bactrim for next 3 months. She has an appointment with orthopedics on 11/5/24. Will have patient follow up in 3 months via virtual visit.         UPDATE 2/5/2025  Doing well, no issues       PLAN:  Continue Bactrim DS BID for next 3 months, will re-assess need for continuation at next virtual visit in 4 months.     Pt is in agreement with plan       I spent 30 minutes in the professional and overall care of this patient.      José Miguel Omer MD MPH

## 2025-02-06 ENCOUNTER — APPOINTMENT (OUTPATIENT)
Dept: INFECTIOUS DISEASES | Facility: CLINIC | Age: 40
End: 2025-02-06
Payer: MEDICARE

## 2025-02-10 ENCOUNTER — PATIENT MESSAGE (OUTPATIENT)
Dept: ORTHOPEDIC SURGERY | Facility: HOSPITAL | Age: 40
End: 2025-02-10
Payer: MEDICARE

## 2025-02-19 ENCOUNTER — APPOINTMENT (OUTPATIENT)
Dept: HEMATOLOGY/ONCOLOGY | Facility: HOSPITAL | Age: 40
End: 2025-02-19
Payer: MEDICARE

## 2025-02-19 ENCOUNTER — APPOINTMENT (OUTPATIENT)
Dept: RADIOLOGY | Facility: HOSPITAL | Age: 40
End: 2025-02-19
Payer: MEDICARE

## 2025-02-19 ENCOUNTER — TELEPHONE (OUTPATIENT)
Dept: HEMATOLOGY/ONCOLOGY | Facility: HOSPITAL | Age: 40
End: 2025-02-19
Payer: MEDICARE

## 2025-02-19 DIAGNOSIS — C34.90 ADENOCARCINOMA, LUNG, UNSPECIFIED LATERALITY (MULTI): ICD-10-CM

## 2025-02-19 RX ORDER — OXYCODONE HYDROCHLORIDE 10 MG/1
10 TABLET ORAL EVERY 4 HOURS PRN
Qty: 90 TABLET | Refills: 0 | Status: SHIPPED | OUTPATIENT
Start: 2025-02-19

## 2025-02-19 NOTE — TELEPHONE ENCOUNTER
Refill request received for Oxycodone 10mg.  Preferred pharmacy is [facility] at [address] [facility] in [place].  Message sent to team to refill if appropriate.

## 2025-03-03 ENCOUNTER — HOSPITAL ENCOUNTER (OUTPATIENT)
Dept: RADIOLOGY | Facility: HOSPITAL | Age: 40
Discharge: HOME | End: 2025-03-03
Payer: MEDICARE

## 2025-03-03 ENCOUNTER — OFFICE VISIT (OUTPATIENT)
Dept: HEMATOLOGY/ONCOLOGY | Facility: HOSPITAL | Age: 40
End: 2025-03-03
Payer: MEDICARE

## 2025-03-03 VITALS
RESPIRATION RATE: 18 BRPM | TEMPERATURE: 97.9 F | HEART RATE: 92 BPM | WEIGHT: 160.05 LBS | SYSTOLIC BLOOD PRESSURE: 102 MMHG | OXYGEN SATURATION: 100 % | BODY MASS INDEX: 26.23 KG/M2 | DIASTOLIC BLOOD PRESSURE: 60 MMHG

## 2025-03-03 DIAGNOSIS — C34.11 MALIGNANT NEOPLASM OF UPPER LOBE OF RIGHT LUNG (MULTI): ICD-10-CM

## 2025-03-03 PROCEDURE — 71250 CT THORAX DX C-: CPT

## 2025-03-03 PROCEDURE — 71250 CT THORAX DX C-: CPT | Performed by: RADIOLOGY

## 2025-03-03 PROCEDURE — 1036F TOBACCO NON-USER: CPT | Performed by: INTERNAL MEDICINE

## 2025-03-03 PROCEDURE — 99215 OFFICE O/P EST HI 40 MIN: CPT | Mod: 25 | Performed by: INTERNAL MEDICINE

## 2025-03-03 PROCEDURE — 99215 OFFICE O/P EST HI 40 MIN: CPT | Performed by: INTERNAL MEDICINE

## 2025-03-03 ASSESSMENT — ENCOUNTER SYMPTOMS
ANOREXIA: 0
VERTIGO: 0
NECK PAIN: 0
CHILLS: 0
COUGH: 0
VOMITING: 0
HEADACHES: 0
VISUAL CHANGE: 0
CHANGE IN BOWEL HABIT: 0
SORE THROAT: 0
FEVER: 0
ABDOMINAL PAIN: 0
NAUSEA: 0
JOINT SWELLING: 0
SWOLLEN GLANDS: 0
NUMBNESS: 0
DIAPHORESIS: 0
FATIGUE: 0
WEAKNESS: 0
MYALGIAS: 0
ARTHRALGIAS: 0

## 2025-03-03 ASSESSMENT — PATIENT HEALTH QUESTIONNAIRE - PHQ9
2. FEELING DOWN, DEPRESSED OR HOPELESS: NOT AT ALL
SUM OF ALL RESPONSES TO PHQ9 QUESTIONS 1 AND 2: 0
1. LITTLE INTEREST OR PLEASURE IN DOING THINGS: NOT AT ALL

## 2025-03-03 ASSESSMENT — PAIN SCALES - GENERAL: PAINLEVEL_OUTOF10: 8

## 2025-03-03 NOTE — PATIENT INSTRUCTIONS
Orders placed today:    -CT guided biopsy of lung  -referral to radiation oncology  -PET scan   -follow up appointment to see Dr. Mayen after scan and biopsy (4/14/25)

## 2025-03-03 NOTE — PROGRESS NOTES
Patient ID: Janette Panda is a 39 y.o. female.    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 PROBABLE Non-small cell lung cancer (poorly differentiated squamous?) Date of diagnosis is August 13, 2020 from a core biopsy of the left posterolateral chest wall mass.  Pathology  reports extensively necrotic epithelioid proliferation in a fibrotic background.  Tumor cells were somewhat rhabdoid in appearance with hyperchromatic, eccentric, moderately to markedly atypical nuclei some with big nuclei and eosinophilic cytoplasm.   By immunohistochemistry tumors were positive for keratin AE1/AE3, CAM 5.2, CK7, calretinin and KATIE-3 and negative for CK20 WT 1, P 40, desmin, SOX-10, CD34, TTF-1, CDX 2, PAX 8, estrogen receptor.  I and I immunostain retained nuclear expression.  The  differential diagnosis was a malignant epithelioid neoplasm including poorly differentiated carcinoma and mesothelioma.        STAGING     Stage IV disease, T1b (1.7 cm nodule within the right upper lobe), NX, M1C         CURRENT SITES OF DISEASE     RUL, left chest wall pleural based, left pleura, right infraclavicular LN, bilateral adrenal glands, right foot metatarsal bone  MRI of brain negative for malignancy        MOLECULAR GENOMICS     Insufficient tissue fotr NGS     1- CancerType ID Molecular Cancer :     Tumor type indeterminate. Cannot be excluded Squamous Cell 35%, Pancreatobiliary 31%, Ovary <5%     Ruled out with 95% confidence: adrenal, brain, cervix and endometrial adenoca, GE adeno, GIST, Germcell     Salivary, colorectal, Kidney, Hepatocellular, lung adeno, lymphoma, melanoma, mesotheliona, neuroendocrin     (including MERKEL and small cell), sarcoma, thymus, thyroid, bladder     2- TEMPUS ctDNA:   KRAS Q61H - 44.5% allelic frequency  CDKN2A H83Y Missense varialt - LOF - allelic frequency 22%  TP53 - R175H missense variant LOF  ARID1A LOF allelic frequency 24%  MSI high not  detected                                  3- PD-L1 IHC TPS: 90%        SERUM TUMOR MARKER     Normal CEA and CA 19.9 in September 2020        PRIOR THERAPY     1- XRT to left chest wall.  2- Palliative XRT to right foot  3- Single agent Keytruda (refused chemotherapy) based on high PD-L1 expression starting Nov 2, 2020. Clinical improvement, radiographic stable disease/pseudoprogression initially; evidence of response on imaging of April 2021.  Completed 2 years of treatment  in October 2022        CURRENT THERAPY     1- Supportive Oncology for symptom management  2- Observation  3-growing 8 mm nodule right upper lobe November 2024.  Consider follow-up CT in 3 months versus SBRT.  Imaging March 2025 show going to now 1 cm, plan for PET scan, biopsy, refer to radiation oncology for possible SBRT, suspect new primary        CURRENT ONCOLOGICAL PROBLEMS     1-severe chest pain related to her left chest wall mass - resolved  2-severe constipation - improved  3-scan on 11/15/2021 showed ground glass opacities, thought by radiology to represent pneumonitis- patient is asymptomatic.  Will hold treatment 11/22/2021 and re-assess in 3 weeks  3-anorexia and losing weight - much improved  4-anxiety - improved  5- Right foot pain from metastasis - improved . Right calcaneal surgery for malunion on September 8, 2022 postoperative infection.  Antibiotic treatments in December 2022. She has required to further right calcaneus wound irrigation debridement, removal  of implants operation on December 15, 2022 as well as further surgery on March 17, 2023.  She is required IV antibiotics for osteomyelitis and followed by infectious diseases.  Bone cultures with multiple organisms.  Osteomyelitis.  On chronic suppressive antibiotic therapy June 2024  6-right upper lobe solitary nodule, growing March 2025, now 1 cm, spiculated, suspect new primary, obtain PET scan, plan for biopsy, refer to radiation oncology for possible SBRT        HISTORY OF PRESENT ILLNESS     This is a very nice 39-year-old patient.  She went to the [facility] emergency room on August 12, 2020 with severe back pain and a large lump that was painful.  When asking her when this started she states approximately 1 to 2 weeks prior to  this although she had been losing weight for several months and was having some on and off pain there when questioning her more carefully.  She underwent a imaging studies with a CT scan of the chest abdomen and pelvis on August 13, 2020 which demonstrated  a 1.7 cm opacity within the right upper lobe felt to be most likely extrapulmonary arising from the pleura or chest wall.  The mass had a lobulated appearance with a small focus of macroscopic fat but no associated calcification.  Furthermore a 2.7 cm  rim-enhancing hypodense lesion with surrounding inflammatory changes was seen within the posterior and lateral aspect of the left 9th-10th rib.  Within the abdomen a 2.9 cm heterogeneous nodule was seen within the right adrenal gland.  She undergoes a  CT-guided biopsy and fine-needle aspiration on August 13, 2020 showing malignant cells along the posterolateral chest wall mass on the left side.  The pathology as described above.  She was seen initially by medical oncology on September 4, 2020 but referred  to my clinic for further evaluation on the day of this visit on September 16, 2020 given the unclear site of origin and pathology.        PAST MEDICAL HISTORY     1- Appendectomy  2- Kidney stones        SOCIAL HISTORY     Patient is 35 years old, she is single, she has no children, she lives with her mother and sister, she lives in [facility] [facility].  She started at the age of 14 smoking and currently still smokes at the age of 35.  Smokes on average 2 packs a day for the  past 20 years.  She works in manufacturing        CURRENT MEDS     See med list        ALLERGIES     Tetracycline        FAMILY HISTORY     Mother had stage III  ovarian cancer at the age of 51.  Grandmother on the father's side had 2 cancers one in the breast and one in the lung.       Subjective    Cancer  Pertinent negatives include no abdominal pain, anorexia, arthralgias, change in bowel habit, chest pain, chills, congestion, coughing, diaphoresis, fatigue, fever, headaches, joint swelling, myalgias, nausea, neck pain, numbness, rash, sore throat, swollen glands, urinary symptoms, vertigo, visual change, vomiting or weakness.     Chronic osteomyelitis of right foot, on antibiotics, continuous pain, controlled by pain medication, drainage, plan for follow-up with orthopedics    Objective    BSA: 1.83 meters squared  /60 (BP Location: Right arm, Patient Position: Sitting, BP Cuff Size: Adult)   Pulse 92   Temp 36.6 °C (97.9 °F) (Temporal)   Resp 18   Wt 72.6 kg (160 lb 0.9 oz)   SpO2 100%   BMI 26.23 kg/m²      Physical Exam  Constitutional:       General: She is not in acute distress.     Appearance: Normal appearance. She is not ill-appearing, toxic-appearing or diaphoretic.   HENT:      Nose: No congestion or rhinorrhea.      Mouth/Throat:      Pharynx: No oropharyngeal exudate or posterior oropharyngeal erythema.   Eyes:      General: No scleral icterus.     Conjunctiva/sclera: Conjunctivae normal.   Cardiovascular:      Rate and Rhythm: Normal rate and regular rhythm.      Pulses: Normal pulses.      Heart sounds: Normal heart sounds. No murmur heard.     No friction rub. No gallop.   Pulmonary:      Effort: Pulmonary effort is normal. No respiratory distress.      Breath sounds: Normal breath sounds. No stridor. No wheezing, rhonchi or rales.   Chest:      Chest wall: No tenderness.   Abdominal:      General: Abdomen is flat. Bowel sounds are normal. There is no distension.      Palpations: Abdomen is soft. There is no mass.      Tenderness: There is no abdominal tenderness. There is no guarding or rebound.   Musculoskeletal:      Cervical back: No  tenderness.   Lymphadenopathy:      Cervical: No cervical adenopathy.   Skin:     General: Skin is warm.      Coloration: Skin is not jaundiced.      Findings: No bruising.   Neurological:      General: No focal deficit present.      Mental Status: She is oriented to person, place, and time.   Psychiatric:         Mood and Affect: Mood normal.         Behavior: Behavior normal.         Performance Status:  Asymptomatic      Assessment/Plan     This is a very nice 39-year-old patient with a history of a poorly differentiated carcinoma (positive for keratin AE1/AE3), possibly a poorly differentiated squamous cell carcinoma involving the left chest wall, left pleural, right infraclavicular lymph node, bilateral adrenal glands, right foot metatarsal bone metastases treated with systemic immunotherapy given high PD-L1 expression (90%) in the absence of actionable mutations (patient has a [id number] ulceration on circulating tumor DNA).  She had a complete response to treatment and completed her 2 years of therapy in October 2022.  We are now close to 2-1/2 years out since her last treatment.    She has a growing right upper lobe nodule that is spiculated.  It is gone from 8 mm to 10 mm in the span of 3 to 4 months.  Given this finding I suspect a new primary lung cancer.  We will obtain a PET scan, biopsy and refer to radiation oncology could potentially consider SBRT if there is no evidence of spread.      Cancer Staging   No matching staging information was found for the patient.      Oncology History    No history exists.                 Thaddeus Mayen MD

## 2025-03-10 DIAGNOSIS — R79.1 COAGULATION TEST ABNORMALITY: Primary | ICD-10-CM

## 2025-03-11 ENCOUNTER — HOSPITAL ENCOUNTER (OUTPATIENT)
Dept: RADIOLOGY | Facility: HOSPITAL | Age: 40
Discharge: HOME | End: 2025-03-11
Payer: MEDICARE

## 2025-03-11 ENCOUNTER — OFFICE VISIT (OUTPATIENT)
Dept: ORTHOPEDIC SURGERY | Facility: HOSPITAL | Age: 40
End: 2025-03-11
Payer: MEDICARE

## 2025-03-11 DIAGNOSIS — M86.171 ACUTE OSTEOMYELITIS OF RIGHT CALCANEUS (MULTI): ICD-10-CM

## 2025-03-11 DIAGNOSIS — T81.89XD DRAINING POSTOPERATIVE WOUND, SUBSEQUENT ENCOUNTER: Primary | ICD-10-CM

## 2025-03-11 PROCEDURE — 99214 OFFICE O/P EST MOD 30 MIN: CPT | Performed by: ORTHOPAEDIC SURGERY

## 2025-03-11 PROCEDURE — 73650 X-RAY EXAM OF HEEL: CPT | Mod: RIGHT SIDE | Performed by: RADIOLOGY

## 2025-03-11 PROCEDURE — G2211 COMPLEX E/M VISIT ADD ON: HCPCS | Performed by: ORTHOPAEDIC SURGERY

## 2025-03-11 PROCEDURE — 1036F TOBACCO NON-USER: CPT | Performed by: ORTHOPAEDIC SURGERY

## 2025-03-11 PROCEDURE — 73650 X-RAY EXAM OF HEEL: CPT | Mod: RT

## 2025-03-11 ASSESSMENT — PAIN - FUNCTIONAL ASSESSMENT: PAIN_FUNCTIONAL_ASSESSMENT: 0-10

## 2025-03-11 ASSESSMENT — PAIN SCALES - GENERAL: PAINLEVEL_OUTOF10: 7

## 2025-03-11 ASSESSMENT — PAIN DESCRIPTION - DESCRIPTORS: DESCRIPTORS: THROBBING;ACHING;SHARP;SHOOTING

## 2025-03-11 NOTE — PROGRESS NOTES
Subjective    Patient ID: Janette Panda is a 39 y.o. female.    Chief Complaint: POV- R calcaneous wound check     Last Surgery: Right partial Calcanectomy // Insertion of antibiotic spacer and secondary wound closure - Right  Last Surgery Date: 8/23/2024    HPI  Patient is a 39 y.o. female who is s/p right partial calcanectomy with insertion of antibiotic spacer and secondary wound closure. Date of surgery was 8/23/2024. Patient has been doing well. She does have  some pain when she walks and drainage from the incision which is yellow after prolonged standing. Patient  is weight bearing on the right leg at tolerated at this time.  Patient denies fever or chills, N/T or calf pain.     Patient has known neoplasm of her right lung and scheduled for biopsy and radiation some time in the end of this month.  Of note patient have history of questionable malignancy to the calcaneus that was treated with radiation.  Most recent biopsy of the calcaneus is negative for malignancy.    ROS: All other systems have been reviewed and are negative except as previously noted in history of present illness.     IMP:  Problem List Items Addressed This Visit       Acute osteomyelitis of right calcaneus (Multi)    Relevant Orders    XR calcaneus right 2 views       Objective   General: Alert and oriented x 3, NAD, respirations easy and unlabored with no audible wheezes, skin warm and dry, speech and dress appropriate for noted age, affect euthymic.     Musculoskeletal: Right Lower Extremity  Proximal incision macerated, distal incision clean, dry, and intact  mild swelling to lower leg  compartments soft  no calf tenderness  sensation intact to light touch  motor intact including TA/GS/EHL  palpable DP/PT pulses 2+   Open sinus over the post heel that measure 7mm that tracks down deep no  active drainage.     X-ray: Images of right calcaneous reviewed personally by me today and reveal maintenance of alignment of partial calcanectomy  with stable antibiotic spacer and no interval change.      Assessment/Plan   Encounter Diagnoses:  Acute osteomyelitis of right calcaneus (Multi)    PLAN: Patient is s/p right partial calcanectomy with insertion of antibiotic spacer and secondary wound closure. She has pain when she walks.  She does have  some drainage from the incision but is yellow. On exam, Open sinus over the post heel that measure 7mm that tracks down deep no  active drainage. We will refer her to follow up with my Plastic surgery partner Dr. De La Garza for possible soft tissue coverage concomitantly with spacer removal calcaneal debridement by myself . Patient has known neoplasm of her right lung and scheduled for biopsy and radiation. We discussed she can proceed with the procedure after she has completed radiation but needs to se Dr. De La Garza to establish care. Patient is  weight bearing as tolerated on the right leg at this time. She can perform ankle ROM at home.  We will coordinate surgical timing with Dr. De La Garza after patient is seen and evaluated.  Patient is in agreement with this plan. Xrays of the right calcaneous will be needed.     Orders Placed This Encounter    XR calcaneus right 2 views     No follow-ups on file.    Scribe Attestation  By signing my name below, ITiara Scribe attest that this documentation has been prepared under the direction and in the presence of José Miguel Lee MD. All medical record entries made by the Scribe were at my direction or personally dictated by me. I have reviewed the chart and agree that the record accurately reflects my personal performance of the history, physical exam, discussion and plan.

## 2025-03-14 ENCOUNTER — APPOINTMENT (OUTPATIENT)
Dept: RADIATION ONCOLOGY | Facility: HOSPITAL | Age: 40
End: 2025-03-14
Payer: MEDICAID

## 2025-03-18 ENCOUNTER — HOSPITAL ENCOUNTER (OUTPATIENT)
Dept: RADIOLOGY | Facility: HOSPITAL | Age: 40
End: 2025-03-18
Payer: MEDICARE

## 2025-03-18 ENCOUNTER — TELEPHONE (OUTPATIENT)
Dept: ADMISSION | Facility: HOSPITAL | Age: 40
End: 2025-03-18
Payer: MEDICARE

## 2025-03-18 ENCOUNTER — HOSPITAL ENCOUNTER (OUTPATIENT)
Dept: RADIOLOGY | Facility: HOSPITAL | Age: 40
Discharge: HOME | End: 2025-03-18
Payer: MEDICARE

## 2025-03-18 DIAGNOSIS — C34.11 MALIGNANT NEOPLASM OF UPPER LOBE OF RIGHT LUNG (MULTI): ICD-10-CM

## 2025-03-18 NOTE — TELEPHONE ENCOUNTER
Janette Panda called the refill line for Oxycodone 10mg. Would like refills to be sent to [facility] pharmacy on file. Message sent to team to send in.

## 2025-03-19 ENCOUNTER — APPOINTMENT (OUTPATIENT)
Dept: PLASTIC SURGERY | Facility: CLINIC | Age: 40
End: 2025-03-19
Payer: MEDICARE

## 2025-03-19 DIAGNOSIS — C34.90 ADENOCARCINOMA, LUNG, UNSPECIFIED LATERALITY (MULTI): ICD-10-CM

## 2025-03-19 RX ORDER — OXYCODONE HYDROCHLORIDE 10 MG/1
10 TABLET ORAL EVERY 4 HOURS PRN
Qty: 90 TABLET | Refills: 0 | Status: SHIPPED | OUTPATIENT
Start: 2025-03-19

## 2025-03-26 ENCOUNTER — APPOINTMENT (OUTPATIENT)
Dept: RADIOLOGY | Facility: HOSPITAL | Age: 40
End: 2025-03-26
Payer: MEDICARE

## 2025-03-27 ENCOUNTER — APPOINTMENT (OUTPATIENT)
Dept: PLASTIC SURGERY | Facility: CLINIC | Age: 40
End: 2025-03-27
Payer: MEDICARE

## 2025-03-27 NOTE — PROGRESS NOTES
Subjective :  Patient ID: Janette Panda is a 39 y.o. female referral from Dr. Lee     History of Present Illness: Patient presents as a referral from Dr. Lee for consideration of soft tissue coverage. Patient is s/p right partial calcanectomy with antibiotic spacer 8/23/24. She has pain when she walks and does have an open sinus that measures 7mm. She also has a past medical history of right lung cancer with recent new right upper lobe nodule requiring further evaluation and biopsy.     Review of Systems  ROS: All 10 systems were reviewed and are unremarkable except for those mentioned in HPI.     Objective :  Physical Exam  Vitals and nursing note reviewed. Exam conducted with a chaperone present.   Constitutional:       General: not in acute distress.     Appearance: not ill-appearing.   Eyes:      Extraocular Movements: Extraocular movements intact.      Conjunctiva/sclera: Conjunctivae normal.      Pupils: Pupils are equal, round, and reactive to light.   Cardiovascular:      Rate and Rhythm: Normal rate and regular rhythm.      Pulses: Normal pulses.   Pulmonary:      Effort: Pulmonary effort is normal.      Breath sounds: Normal breath sounds.   Abdominal:      Palpations: Abdomen is soft. There is no mass.      Tenderness: There is no abdominal tenderness.      Hernia: No hernia is present.   Musculoskeletal:         General: No swelling or tenderness.      Cervical back: Normal range of motion and neck supple.   Skin:     Capillary Refill: Capillary refill takes less than 2 seconds.      Coloration: Skin is not jaundiced.      Findings: No bruising or rash.   Neurological:      General: No focal deficit present.      Mental Status: oriented to person, place, and time.   Psychiatric:         Mood and Affect: Mood normal.         Behavior: Behavior normal.         Thought Content: Thought content normal.         Judgment: Judgment normal.     Assessment/Plan :  {Assess/PlanSmartLinks:33427}   seconds.      Coloration: Skin is not jaundiced.      Findings: No bruising or rash.   Neurological:      General: No focal deficit present.      Mental Status: oriented to person, place, and time.   Psychiatric:         Mood and Affect: Mood normal.         Behavior: Behavior normal.         Thought Content: Thought content normal.         Judgment: Judgment normal.     Right lateral posterior heel wound measures 1cm x 3cm drains moderate amount of fluid        Assessment/Plan :  The patient is a 39-year-old female currently pending right calcaneal debridement with spacer removal by Dr. Lee, with planned soft tissue coverage assistance by Dr. De La Garza.    Discussion:    Today, I discussed with the patient (Janette) the available options for soft tissue reconstruction.  Given the presence of a sinus tract and the depth of the wound, we believe that a split-thickness skin graft (STSG) alone may not provide adequate coverage. Therefore, we recommend being prepared for either a pedicled flap or free flap reconstruction depending on intraoperative findings.    Next Steps:    We have ordered a CTA (computed tomographic angiography) of the bilateral lower extremities to assist in preoperative planning.    After imaging is completed, we will bring the patient back to clinic for a detailed surgical planning discussion with Dr. De La Garza to finalize the reconstruction strategy.    We are honored to be included in your care and to work alongside Dr. Lee and his team.  We look forward to seeing you again after your CTA is completed.

## 2025-04-01 ENCOUNTER — TELEPHONE (OUTPATIENT)
Dept: RADIATION ONCOLOGY | Facility: HOSPITAL | Age: 40
End: 2025-04-01
Payer: MEDICARE

## 2025-04-01 NOTE — TELEPHONE ENCOUNTER
Ear wax is normal and should only be removed if causing symptoms or a medical provider needs to see your ear drum/ear canal.  These drops below can help to soften the wax to max it easier to fall out.  Your ear canal will naturally move wax out of your ear.    -->>> Use three drops, three times daily, for three days prior to your next appointment.     Then after this you can use this once monthly as needed       Nasal Steroid Spray  You have been prescribed or instructed to take a nasal steroid spray (Flonase). Some symptoms will experience relief within a few days; however, it may take 2-3 weeks to begin to see improvement. This medication needs to be taken consistently to see results.    Use as directed and please be aware the Flonase takes 7-10 days of consistent use before becoming effective- so try to be patient :)    Helpful hints for maximizing nasal spray medication into the nose  - Use the opposite hand to spray the nostril (example: right hand for left nostril). This will help avoid spraying the medication onto the septum (the area that divides the left and right nasal cavity.)  - Tilt the bottle so that it is facing at a slight angle up or straight back, but avoid pointing the bottle straight up while spraying.   - Gently sniff (do not snort) a few seconds after spraying.    Pt confirmed NPV appointment on 04/03/25 at 1:30.

## 2025-04-03 ENCOUNTER — TELEPHONE (OUTPATIENT)
Dept: RADIATION ONCOLOGY | Facility: HOSPITAL | Age: 40
End: 2025-04-03
Payer: MEDICARE

## 2025-04-03 ENCOUNTER — HOSPITAL ENCOUNTER (OUTPATIENT)
Dept: RADIATION ONCOLOGY | Facility: HOSPITAL | Age: 40
Setting detail: RADIATION/ONCOLOGY SERIES
Discharge: HOME | End: 2025-04-03
Payer: MEDICAID

## 2025-04-03 NOTE — PROGRESS NOTES
Radiation Oncology Outpatient Consult    Patient Name:  Janette Panda  MRN:  [id number]  :  1985    Referring Provider: Thaddeus Mayen MD  Primary Care Provider: Sylvie Allison MD  Care Team: Patient Care Team:  Sylvie Allison MD as PCP - General (Family Medicine)  Sylvie Allison MD as PCP - [employer] PCP  Thaddeus Mayen MD as Consulting Physician (Hematology and Oncology)  Tyra Barrientos LPN as Licensed Practical Nurse (Orthopaedic Surgery)  José Miguel Lee MD as Surgeon (Orthopaedic Surgery)    Date of Service: 4/3/2025     History of Present Illness:  Janette Panda is a 39 y.o. 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  She is presenting for evaluation and management of enlarging right upper lobe solitary nodule, growing 2025, now 1 cm, spiculated, suspect new primary    Her oncological work up and treatment history is as below:  Diagnosed 2020 when she presented with chest wall lump that led to further workup including CT scans PET/CT and a biopsy identifying a keratin positive malignant epithelioid neoplasm with extensive necrosis likely non-small cell lung cancer origin. Tumor cells were somewhat rhabdoid in appearance with hyperchromatic, eccentric, moderately to markedly atypical nuclei some with big nuclei and eosinophilic cytoplasm.   By immunohistochemistry tumors were positive for keratin AE1/AE3, CAM 5.2, CK7, calretinin and KATIE-3 and negative for CK20 WT 1, P 40, desmin, SOX-10, CD34, TTF-1, CDX 2, PAX 8, estrogen receptor.  I and I immunostain retained nuclear expression.  The  differential diagnosis was a malignant epithelioid neoplasm  including poorly differentiated carcinoma and mesothelioma. PD-L1 IHC TPS: 90%    Received palliative radiation to Left posterior CW, 30 Gy/ 10 fractions, 3D conformal (10/26/20 - 11/10/20) and Right heal, 30 Gy/ 10 fractions, 3D conformal (10/29/20 - 11/13/20).    Single agent Keytruda (refused chemotherapy) based on high PD-L1 expression starting Nov 2, 2020. Clinical improvement, radiographic stable disease/pseudoprogression initially; evidence of response on imaging of April 2021.  Completed 2 years of treatment  in October 2022     Right calcaneal surgery for malunion on September 8, 2022 postoperative infection.  Antibiotic treatments in December 2022. She has required to further right calcaneus wound irrigation debridement, removal  of implants operation on December 15, 2022 as well as further surgery on March 17, 2023.  She is required IV antibiotics for osteomyelitis and followed by infectious diseases.  Bone cultures with multiple organisms.  Osteomyelitis.  On chronic suppressive antibiotic therapy June 2024.    Continued imaging surveillance has revealed a right upper lobe solitary nodule, growing March 2025, now 1 cm, spiculated, suspect new primary.    PET-CT, biopsy pending.    Presents to discuss role of SBRT.     Prior Radiation Details:  Left posterior CW, 30 Gy/ 10 fractions, 3D conformal (10/26/20 - 11/10/20)        Right heal, 30 Gy/ 10 fractions, 3D conformal (10/29/20 - 11/13/20)      Pathology Review:  The pertinent pathology results were reviewed from EMR and discussed with the patient.       Imaging:  The pertinent imaging results were reviewed from EMR/PACS with key results discussed with the patient.    CT CHEST WO IV CONTRAST; 3/3/2025   Interval  increase in spiculated nodule within the apical segment right upper  lobe now measuring 1.0 x 0.9 cm previously 0.8 x 0.5 cm.  Additional ground-glass nodule in the anterior segment right upper  lobe measuring 0.6 cm is stable as compared to  the most recent exams  however it has been slowly enlarging when compared to numerous CTs  dating back to 2020. Recommend attention on follow-up.  3. Stable 1.0 x 0.4 cm solid nodule in the basal lateral segment left  lower lobe with surrounding postradiation changes.  4. Stable posttreatment changes of the left 8th and 9th ribs.  5. Similar left adrenal gland nodule measuring 1.4 x 0.9 cm and focus  of calcification within the right adrenal gland.      PFT:  FEV1: *** L/ ***% predicted.  FVC: *** L/ ***% predicted.  DLCOuc: ***% predicted    Review of Systems: See separately signed RN note.    RADIATION SCREENING QUESTIONS:  Prior radiation therapy: {YES-DESCRIBE/NO:28978}  Pacemaker: {YES-DESCRIBE/NO:91760}  Other implantable devices: {YES-DESCRIBE/NO:19276}  Connective tissue disease: {YES-DESCRIBE/NO:39948}    Current Systemic Treatment:  {YES-DESCRIBE/NO:68563}     Past Medical History:    Past Medical History:   Diagnosis Date    Acute osteomyelitis of right calcaneus (Multi)     Anemia     Anxiety     Arthritis     Asthma     Metastatic lung cancer (metastasis from lung to other site) (Multi)     Personal history of other diseases of the respiratory system 2021    History of sinus problem    PONV (postoperative nausea and vomiting)     PTSD (post-traumatic stress disorder)      Past Surgical History:    Past Surgical History:   Procedure Laterality Date    APPENDECTOMY      OTHER SURGICAL HISTORY  09/15/2020    I & D right calcaneus      Family History:  Cancer-related family history includes Ovarian cancer in her mother.  Social History:    Social History     Tobacco Use    Smoking status: Former     Current packs/day: 0.00     Types: Cigarettes     Quit date:      Years since quittin.2    Smokeless tobacco: Never   Vaping Use    Vaping status: Never Used   Substance Use Topics    Alcohol use: Yes     Comment: social    Drug use: Yes     Types: Marijuana     Allergies:    Allergies    Allergen Reactions    Amitriptyline Itching and Other     nausea    Tetracyclines Nausea/vomiting     Vomiting     Adhesive Tape-Silicones Itching    Latex Unknown    Other Itching and Other     Tegaderm Absorbent Dressing -itching  [id number] Standard - blisters and rash    Tramadol Other     N/V     Medications:    Current Outpatient Medications:     albuterol 90 mcg/actuation inhaler, Inhale 1 puff if needed., Disp: , Rfl:     budesonide-formoteroL (Symbicort) 80-4.5 mcg/actuation inhaler, Inhale 2 puffs 2 times a day., Disp: , Rfl:     calcium carbonate-vitamin D3 (Oscal-500) 500 mg-10 mcg (400 unit) tablet, Take 1 tablet by mouth 2 times a day., Disp: 60 tablet, Rfl: 11    calcium carbonate-vitamin D3 500 mg-10 mcg (400 unit) chewable tablet, Chew 1 tablet every 12 hours., Disp: , Rfl:     cyclobenzaprine (Flexeril) 10 mg tablet, Take 1 tablet (10 mg) by mouth 3 times a day as needed for muscle spasms., Disp: 90 tablet, Rfl: 0    ertapenem 1,000 mg in sodium chloride 0.9% 50 mL IV, Infuse 1,000 mg at 100 mL/hr over 30 minutes into a venous catheter once every 24 hours., Disp: 40 each, Rfl: 0    gabapentin (Neurontin) 300 mg capsule, Take 1 capsule (300 mg) by mouth 3 times a day as needed (nerve pain)., Disp: 270 capsule, Rfl: 3    heparin flush (heparin LockFlush,Porcine,,PF,) 10 unit/mL injection, Infuse 5 mL (50 Units) into a venous catheter once daily., Disp: , Rfl:     montelukast (Singulair) 10 mg tablet, Take 1 tablet (10 mg) by mouth once daily., Disp: , Rfl:     naloxone (Narcan) 4 mg/0.1 mL nasal spray, Administer 1 spray (4 mg) into affected nostril(s) if needed for opioid reversal. May repeat every 2-3 minutes if needed, alternating nostrils, until medical assistance becomes available., Disp: 2 each, Rfl: 0    omeprazole (PriLOSEC) 10 mg DR capsule, Take by mouth., Disp: , Rfl:     oxyCODONE (Roxicodone) 10 mg immediate release tablet, Take 1 tablet (10 mg) by mouth every 4 hours if needed for severe  pain (7 - 10)., Disp: 90 tablet, Rfl: 0    phenylephrine (Sudafed PE) 10 mg tablet, Take 1 tablet (10 mg) by mouth every 4 hours if needed., Disp: , Rfl:     sodium chloride 0.9% (Normal Saline Flush) flush, Infuse 10 mL into a venous catheter once daily., Disp: , Rfl:     sulfamethoxazole-trimethoprim (Bactrim DS) 800-160 mg tablet, TAKE 1 TABLET BY MOUTH TWICE A DAY, Disp: 180 tablet, Rfl: 1    sulfamethoxazole-trimethoprim (Bactrim DS) 800-160 mg tablet, , Disp: , Rfl:     Symbicort 80-4.5 mcg/actuation inhaler, , Disp: , Rfl:     Tylenol Extra Strength 500 mg tablet, Take 1-2 tablets (500-1,000 mg) by mouth every 8 hours if needed for mild pain (1 - 3) or moderate pain (4 - 6)., Disp: , Rfl:       Performance Status:  The Karnofsky performance scale today is {DESC; KARNOFSKY SCALE WITH ECOG EQUIVALENT:41380}.     OBJECTIVE  Physical Exam:  There were no vitals taken for this visit.   Physical Exam     Laboratory Review:  The pertinent lab results were reviewed and discussed with the patient.      ASSESSMENT:  Janette Panda is a 39 y.o. female with No matching staging information was found for the patient..  ***.     PLAN:    Ms. Panda's pertinent history, exam, imaging and pathology details were reviewed.   Accompanied by ***.    Treatment recommendations/Alternatives:  NCCN Guidelines {Actions; were/were not:46090} applicable to guide this patients treatment plan.   We reviewed the standard of care management for ***.     Per tumor board discussion ***he was recommended ***.  Treatment alternatives including *** were reviewed.    Radiation treatment logistics:  We then focused our attention regarding logistics, rationale and potential risks with radiation therapy. This will need an initial simulation/mapping that will involve a planning CT scan in treatment position followed by a 2-3 week planning period and then initiation of radiation treatments. *** will be planned for *** fractions, daily sessions,  Monday-Friday, as out-patient.    We then reviewed the role of advanced radiation modalities including proton therapy and VMAT/IMRT (photons). Given the ***, there might be dosimetric benefits of considering proton therapy to reduce dose to the ***. This could potentially translate to reduced risk of *** complications. As such, I have recommended proton therapy.    We then reviewed possible side effects (Acute and long-term). Common and uncommon side effects with risks as relevant for his case were discussed.    After detailed discussion of risks/benefits/goals/alternatives, patient signed the informed consent.  CT simulation will be arranged at the earliest.    Orders placed:  1) ***PFTs  2) Radonc intent to treat: IMRT/VMAT    Network location: The patient will be treated at the  *** location. Simulation will be done at the *** location.    Pain Management:  No acute needs    Social Work:  No acute needs    Nutrition: Will be seen by Dietician.    The patient was provided my contact information.       Melissa Hill MD, MMM  Senior Attending Physician, [facility]  Professor, [employer]   Our [place]: “To Heal, To Teach, To Discover.”  RN partner: [phone]/ [email or web address]    Phone (scheduling): [phone]/ [email or web address]  Proton Therapy (scheduling): [phone]/ [email or web address]  Phone (after hours): [phone]

## 2025-04-09 ENCOUNTER — TELEPHONE (OUTPATIENT)
Dept: RADIATION ONCOLOGY | Facility: HOSPITAL | Age: 40
End: 2025-04-09
Payer: MEDICARE

## 2025-04-09 NOTE — TELEPHONE ENCOUNTER
I was able to get pt's PET/CT rescheduled for tomorrow at 10:45 prior to her NPV with Dr. Hill.  Called and left a message requesting she call back to let me know if this will work.

## 2025-04-09 NOTE — TELEPHONE ENCOUNTER
Called pt to remind of NPV appointment on 04/10/25 at 12:30. Pt's phone went to voicemail left number if needs to reschedule.

## 2025-04-10 ENCOUNTER — HOSPITAL ENCOUNTER (OUTPATIENT)
Dept: RADIATION ONCOLOGY | Facility: HOSPITAL | Age: 40
Setting detail: RADIATION/ONCOLOGY SERIES
Discharge: HOME | End: 2025-04-10
Payer: MEDICARE

## 2025-04-10 ENCOUNTER — HOSPITAL ENCOUNTER (OUTPATIENT)
Dept: RADIOLOGY | Facility: HOSPITAL | Age: 40
Discharge: HOME | End: 2025-04-10
Payer: MEDICARE

## 2025-04-10 ENCOUNTER — LAB (OUTPATIENT)
Dept: LAB | Facility: HOSPITAL | Age: 40
End: 2025-04-10
Payer: MEDICARE

## 2025-04-10 VITALS
RESPIRATION RATE: 18 BRPM | BODY MASS INDEX: 27.15 KG/M2 | HEART RATE: 76 BPM | WEIGHT: 165.7 LBS | DIASTOLIC BLOOD PRESSURE: 73 MMHG | OXYGEN SATURATION: 99 % | SYSTOLIC BLOOD PRESSURE: 107 MMHG | TEMPERATURE: 96.8 F

## 2025-04-10 DIAGNOSIS — C34.11 MALIGNANT NEOPLASM OF UPPER LOBE OF RIGHT LUNG (MULTI): Primary | ICD-10-CM

## 2025-04-10 DIAGNOSIS — R19.00 PELVIC MASS IN FEMALE: ICD-10-CM

## 2025-04-10 DIAGNOSIS — M86.171 ACUTE OSTEOMYELITIS OF RIGHT CALCANEUS (MULTI): ICD-10-CM

## 2025-04-10 LAB
ALBUMIN SERPL BCP-MCNC: 4.7 G/DL (ref 3.4–5)
ALP SERPL-CCNC: 94 U/L (ref 33–110)
ALT SERPL W P-5'-P-CCNC: 8 U/L (ref 7–45)
ANION GAP SERPL CALC-SCNC: 12 MMOL/L (ref 10–20)
AST SERPL W P-5'-P-CCNC: 16 U/L (ref 9–39)
BASOPHILS # BLD AUTO: 0.04 X10*3/UL (ref 0–0.1)
BASOPHILS NFR BLD AUTO: 0.3 %
BILIRUB SERPL-MCNC: 0.4 MG/DL (ref 0–1.2)
BUN SERPL-MCNC: 6 MG/DL (ref 6–23)
CALCIUM SERPL-MCNC: 9.6 MG/DL (ref 8.6–10.6)
CHLORIDE SERPL-SCNC: 104 MMOL/L (ref 98–107)
CO2 SERPL-SCNC: 25 MMOL/L (ref 21–32)
CREAT SERPL-MCNC: 0.79 MG/DL (ref 0.5–1.05)
CRP SERPL-MCNC: 0.27 MG/DL
EGFRCR SERPLBLD CKD-EPI 2021: >90 ML/MIN/1.73M*2
EOSINOPHIL # BLD AUTO: 0.21 X10*3/UL (ref 0–0.7)
EOSINOPHIL NFR BLD AUTO: 1.8 %
ERYTHROCYTE [DISTWIDTH] IN BLOOD BY AUTOMATED COUNT: 14.4 % (ref 11.5–14.5)
GLUCOSE BLD MANUAL STRIP-MCNC: 85 MG/DL (ref 74–99)
GLUCOSE SERPL-MCNC: 71 MG/DL (ref 74–99)
HCT VFR BLD AUTO: 38.4 % (ref 36–46)
HGB BLD-MCNC: 12.1 G/DL (ref 12–16)
IMM GRANULOCYTES # BLD AUTO: 0.02 X10*3/UL (ref 0–0.7)
IMM GRANULOCYTES NFR BLD AUTO: 0.2 % (ref 0–0.9)
INR PPP: 1 (ref 0.9–1.1)
LYMPHOCYTES # BLD AUTO: 5.07 X10*3/UL (ref 1.2–4.8)
LYMPHOCYTES NFR BLD AUTO: 42.4 %
MCH RBC QN AUTO: 27.6 PG (ref 26–34)
MCHC RBC AUTO-ENTMCNC: 31.5 G/DL (ref 32–36)
MCV RBC AUTO: 88 FL (ref 80–100)
MONOCYTES # BLD AUTO: 0.75 X10*3/UL (ref 0.1–1)
MONOCYTES NFR BLD AUTO: 6.3 %
NEUTROPHILS # BLD AUTO: 5.86 X10*3/UL (ref 1.2–7.7)
NEUTROPHILS NFR BLD AUTO: 49 %
NRBC BLD-RTO: 0 /100 WBCS (ref 0–0)
PLATELET # BLD AUTO: 558 X10*3/UL (ref 150–450)
POTASSIUM SERPL-SCNC: 4.1 MMOL/L (ref 3.5–5.3)
PROT SERPL-MCNC: 8.5 G/DL (ref 6.4–8.2)
PROTHROMBIN TIME: 10.9 SECONDS (ref 9.8–12.4)
RBC # BLD AUTO: 4.39 X10*6/UL (ref 4–5.2)
SODIUM SERPL-SCNC: 137 MMOL/L (ref 136–145)
WBC # BLD AUTO: 12 X10*3/UL (ref 4.4–11.3)

## 2025-04-10 PROCEDURE — 85025 COMPLETE CBC W/AUTO DIFF WBC: CPT

## 2025-04-10 PROCEDURE — 82947 ASSAY GLUCOSE BLOOD QUANT: CPT

## 2025-04-10 PROCEDURE — 86140 C-REACTIVE PROTEIN: CPT

## 2025-04-10 PROCEDURE — 3430000001 HC RX 343 DIAGNOSTIC RADIOPHARMACEUTICALS: Performed by: INTERNAL MEDICINE

## 2025-04-10 PROCEDURE — 85610 PROTHROMBIN TIME: CPT | Performed by: NURSE PRACTITIONER

## 2025-04-10 PROCEDURE — G2211 COMPLEX E/M VISIT ADD ON: HCPCS | Performed by: RADIOLOGY

## 2025-04-10 PROCEDURE — 84075 ASSAY ALKALINE PHOSPHATASE: CPT

## 2025-04-10 PROCEDURE — 78816 PET IMAGE W/CT FULL BODY: CPT | Mod: PS

## 2025-04-10 PROCEDURE — 99215 OFFICE O/P EST HI 40 MIN: CPT | Performed by: RADIOLOGY

## 2025-04-10 PROCEDURE — A9552 F18 FDG: HCPCS | Performed by: INTERNAL MEDICINE

## 2025-04-10 PROCEDURE — 99205 OFFICE O/P NEW HI 60 MIN: CPT | Performed by: RADIOLOGY

## 2025-04-10 RX ORDER — ALBUTEROL SULFATE 90 UG/1
1 INHALANT RESPIRATORY (INHALATION) ONCE
OUTPATIENT
Start: 2025-04-10

## 2025-04-10 RX ORDER — FLUDEOXYGLUCOSE F 18 200 MCI/ML
11.69 INJECTION, SOLUTION INTRAVENOUS
Status: COMPLETED | OUTPATIENT
Start: 2025-04-10 | End: 2025-04-10

## 2025-04-10 RX ORDER — ALBUTEROL SULFATE 0.83 MG/ML
3 SOLUTION RESPIRATORY (INHALATION) ONCE
OUTPATIENT
Start: 2025-04-10 | End: 2025-04-10

## 2025-04-10 RX ADMIN — FLUDEOXYGLUCOSE F 18 11.69 MILLICURIE: 200 INJECTION, SOLUTION INTRAVENOUS at 10:55

## 2025-04-10 ASSESSMENT — PATIENT HEALTH QUESTIONNAIRE - PHQ9
SUM OF ALL RESPONSES TO PHQ9 QUESTIONS 1 AND 2: 0
2. FEELING DOWN, DEPRESSED OR HOPELESS: NOT AT ALL
1. LITTLE INTEREST OR PLEASURE IN DOING THINGS: NOT AT ALL

## 2025-04-10 ASSESSMENT — ENCOUNTER SYMPTOMS
PSYCHIATRIC NEGATIVE: 1
ENDOCRINE NEGATIVE: 1
EYES NEGATIVE: 1
GASTROINTESTINAL NEGATIVE: 1
CONSTITUTIONAL NEGATIVE: 1
HEMATOLOGIC/LYMPHATIC NEGATIVE: 1
NEUROLOGICAL NEGATIVE: 1
CARDIOVASCULAR NEGATIVE: 1
MUSCULOSKELETAL NEGATIVE: 1

## 2025-04-10 ASSESSMENT — COLUMBIA-SUICIDE SEVERITY RATING SCALE - C-SSRS
2. HAVE YOU ACTUALLY HAD ANY THOUGHTS OF KILLING YOURSELF?: NO
1. IN THE PAST MONTH, HAVE YOU WISHED YOU WERE DEAD OR WISHED YOU COULD GO TO SLEEP AND NOT WAKE UP?: NO
6. HAVE YOU EVER DONE ANYTHING, STARTED TO DO ANYTHING, OR PREPARED TO DO ANYTHING TO END YOUR LIFE?: NO

## 2025-04-10 ASSESSMENT — PAIN SCALES - GENERAL: PAINLEVEL_OUTOF10: 7

## 2025-04-10 NOTE — PROGRESS NOTES
Radiation Oncology Nursing Note    Prior Radiotherapy:  Yes, describe:    No radiation treatments to show. (Treatments may have been administered in another system.)     Current Systemic Treatment:  No     Presence of Pacemaker or ICD:  No    History of Autoimmune or Connective Tissue Disorders:  No    Pain: The patient's current pain level was assessed.  They report currently having a pain of 7 out of 10.  They feel their pain is under control with the use of pain medications.    Review of Systems:  Review of Systems   Constitutional: Negative.    HENT:  Negative.     Eyes: Negative.    Respiratory:          Pain to posterior left side; tender to touch   Cardiovascular: Negative.    Gastrointestinal: Negative.    Endocrine: Negative.    Genitourinary: Negative.     Musculoskeletal: Negative.    Skin: Negative.    Neurological: Negative.    Hematological: Negative.    Psychiatric/Behavioral: Negative.          Patient here alone to discuss radiation to lungs.  Patient had prior radiation in 2020 and had difficulty with it,  Patient had nausea, decreased appetite, fatigue ad constipation.  Patient states that if it is going to be like that again she doesn't want treatment.  Patient had PET/CT prior to this appointment, and has a lung bx scheduled for 4/18.  Patient to see Dr. Mayen on 4/14.

## 2025-04-10 NOTE — PROGRESS NOTES
Radiation Oncology Outpatient Consult    Patient Name:  Janette Panda  MRN:  [id number]  :  1985    Referring Provider: Thaddeus Mayen MD  Primary Care Provider: Sylvie Allison MD  Care Team: Patient Care Team:  Sylvie Allison MD as PCP - General (Family Medicine)  Sylvie Allison MD as PCP - [employer] PCP  Thaddeus Mayen MD as Consulting Physician (Hematology and Oncology)  Tyra Barrientos LPN as Licensed Practical Nurse (Orthopaedic Surgery)  José Miguel Lee MD as Surgeon (Orthopaedic Surgery)    Date of Service: 4/10/2025     History of Present Illness:  Janette Panda is a 39 y.o. 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 Department of Radiation Oncology.  She is presenting for evaluation and management of enlarging right upper lobe solitary nodule, growing 2025, now 1 cm, spiculated, suspect new primary.    Her oncological work up and treatment history is as below:  Diagnosed 2020 when she presented with chest wall lump that led to further workup including CT scans PET/CT and a biopsy identifying a keratin positive malignant epithelioid neoplasm with extensive necrosis likely non-small cell lung cancer origin. Tumor cells were somewhat rhabdoid in appearance with hyperchromatic, eccentric, moderately to markedly atypical nuclei some with big nuclei and eosinophilic cytoplasm.   By immunohistochemistry tumors were positive for keratin AE1/AE3, CAM 5.2, CK7, calretinin and KATIE-3 and negative for CK20 WT 1, P 40, desmin, SOX-10, CD34, TTF-1, CDX 2, PAX 8, estrogen receptor.  I and I immunostain retained nuclear expression.  The  differential diagnosis was a malignant epithelioid neoplasm  including poorly differentiated carcinoma and mesothelioma. PD-L1 IHC TPS: 90%  Received palliative radiation to Left posterior CW, 30 Gy/ 10 fractions, 3D conformal (10/26/20 - 11/10/20) and Right heal, 30 Gy/ 10 fractions, 3D conformal (10/29/20 - 11/13/20).  Single agent Keytruda (refused chemotherapy) based on high PD-L1 expression starting Nov 2, 2020. Clinical improvement, radiographic stable disease/pseudoprogression initially; evidence of response on imaging of April 2021.  Completed 2 years of treatment  in October 2022   Right calcaneal surgery for malunion on September 8, 2022 postoperative infection.  Antibiotic treatments in December 2022. She has required to further right calcaneus wound irrigation debridement, removal  of implants operation on December 15, 2022 as well as further surgery on March 17, 2023.  She is required IV antibiotics for osteomyelitis and followed by infectious diseases.  Bone cultures with multiple organisms.  Osteomyelitis.  On chronic suppressive antibiotic therapy June 2024.  Continued imaging surveillance has revealed a right upper lobe solitary nodule, growing March 2025, now 1 cm, spiculated, suspect new primary.  CT-guided biopsy scheduled 4/18/2025. PET-CT completed today.  Presents to discuss role of SBRT.     Prior Radiation Details:  Left posterior CW, 30 Gy/ 10 fractions, 3D conformal (10/26/20 - 11/10/20)        Right heal, 30 Gy/ 10 fractions, 3D conformal (10/29/20 - 11/13/20)      Pathology Review:  The pertinent pathology results were reviewed from EMR and discussed with the patient.       Imaging:  The pertinent imaging results were reviewed from EMR/PACS with key results discussed with the patient.    CT CHEST WO IV CONTRAST; 3/3/2025   Interval increase in spiculated nodule within the apical segment right upper  lobe now measuring 1.0 x 0.9 cm previously 0.8 x 0.5 cm.  Additional ground-glass nodule in the anterior segment right upper  lobe measuring 0.6 cm is  stable as compared to the most recent exams  however it has been slowly enlarging when compared to numerous CTs  dating back to 08/13/2020. Recommend attention on follow-up.  3. Stable 1.0 x 0.4 cm solid nodule in the basal lateral segment left  lower lobe with surrounding postradiation changes.  4. Stable posttreatment changes of the left 8th and 9th ribs.  5. Similar left adrenal gland nodule measuring 1.4 x 0.9 cm and focus  of calcification within the right adrenal gland.        PET CT FDG WHOLEBODY; 4/10/2025   *Mildly FDG avid nodule with SUV max 2.6 within the apical segment of  right upper lobe. No focal FDG uptake corresponding to pleural-based  6 mm ground-glass nodule in anterior segment of right upper lobe.  *Post radiation changes within left lower lobe with mild nonspecific  diffuse FDG uptake with a SUV max 1.3. *No evidence of FDG avid  mediastinal, hilar or axillary lymphadenopathy.  *Focal FDG uptake with a SUV max 6.6 within right pelvic sidewall,  likely right ovarian tissue. *FDG avid right inguinofemoral lymph  nodes with a SUV max 3.4 are seen. Bilateral adrenal glands are  unremarkable.     PFT:  Not recently.    Review of Systems: See separately signed RN note.    RADIATION SCREENING QUESTIONS:  Prior radiation therapy: Yes, describe: Per above  Pacemaker: No  Other implantable devices: No  Connective tissue disease: No    Current Systemic Treatment:  No     Past Medical History:    Past Medical History:   Diagnosis Date    Acute osteomyelitis of right calcaneus (Multi)     Anemia     Anxiety     Arthritis     Asthma     Metastatic lung cancer (metastasis from lung to other site) (Multi)     Personal history of other diseases of the respiratory system 01/19/2021    History of sinus problem    PONV (postoperative nausea and vomiting)     PTSD (post-traumatic stress disorder)      Past Surgical History:    Past Surgical History:   Procedure Laterality Date    APPENDECTOMY      OTHER SURGICAL  HISTORY  09/15/2020    I & D right calcaneus      Family History:  Cancer-related family history includes Ovarian cancer in her mother.  Social History:    Social History     Tobacco Use    Smoking status: Former     Current packs/day: 0.00     Types: Cigarettes     Quit date:      Years since quittin.2    Smokeless tobacco: Never   Vaping Use    Vaping status: Never Used   Substance Use Topics    Alcohol use: Yes     Comment: social    Drug use: Yes     Types: Marijuana     Allergies:    Allergies   Allergen Reactions    Amitriptyline Itching and Other     nausea    Tetracyclines Nausea/vomiting     Vomiting     Adhesive Tape-Silicones Itching    Latex Unknown    Other Itching and Other     Tegaderm Absorbent Dressing -itching  Sx1226 Standard - blisters and rash    Tramadol Other     N/V     Medications:    Current Outpatient Medications:     albuterol 90 mcg/actuation inhaler, Inhale 1 puff if needed., Disp: , Rfl:     budesonide-formoteroL (Symbicort) 80-4.5 mcg/actuation inhaler, Inhale 2 puffs 2 times a day., Disp: , Rfl:     calcium carbonate-vitamin D3 (Oscal-500) 500 mg-10 mcg (400 unit) tablet, Take 1 tablet by mouth 2 times a day., Disp: 60 tablet, Rfl: 11    calcium carbonate-vitamin D3 500 mg-10 mcg (400 unit) chewable tablet, Chew 1 tablet every 12 hours., Disp: , Rfl:     cyclobenzaprine (Flexeril) 10 mg tablet, Take 1 tablet (10 mg) by mouth 3 times a day as needed for muscle spasms., Disp: 90 tablet, Rfl: 0    ertapenem 1,000 mg in sodium chloride 0.9% 50 mL IV, Infuse 1,000 mg at 100 mL/hr over 30 minutes into a venous catheter once every 24 hours., Disp: 40 each, Rfl: 0    gabapentin (Neurontin) 300 mg capsule, Take 1 capsule (300 mg) by mouth 3 times a day as needed (nerve pain)., Disp: 270 capsule, Rfl: 3    heparin flush (heparin LockFlush,Porcine,,PF,) 10 unit/mL injection, Infuse 5 mL (50 Units) into a venous catheter once daily., Disp: , Rfl:     montelukast (Singulair) 10 mg tablet,  Take 1 tablet (10 mg) by mouth once daily., Disp: , Rfl:     naloxone (Narcan) 4 mg/0.1 mL nasal spray, Administer 1 spray (4 mg) into affected nostril(s) if needed for opioid reversal. May repeat every 2-3 minutes if needed, alternating nostrils, until medical assistance becomes available., Disp: 2 each, Rfl: 0    omeprazole (PriLOSEC) 10 mg DR capsule, Take by mouth., Disp: , Rfl:     oxyCODONE (Roxicodone) 10 mg immediate release tablet, Take 1 tablet (10 mg) by mouth every 4 hours if needed for severe pain (7 - 10)., Disp: 90 tablet, Rfl: 0    phenylephrine (Sudafed PE) 10 mg tablet, Take 1 tablet (10 mg) by mouth every 4 hours if needed., Disp: , Rfl:     sodium chloride 0.9% (Normal Saline Flush) flush, Infuse 10 mL into a venous catheter once daily., Disp: , Rfl:     sulfamethoxazole-trimethoprim (Bactrim DS) 800-160 mg tablet, TAKE 1 TABLET BY MOUTH TWICE A DAY, Disp: 180 tablet, Rfl: 1    sulfamethoxazole-trimethoprim (Bactrim DS) 800-160 mg tablet, , Disp: , Rfl:     Symbicort 80-4.5 mcg/actuation inhaler, , Disp: , Rfl:     Tylenol Extra Strength 500 mg tablet, Take 1-2 tablets (500-1,000 mg) by mouth every 8 hours if needed for mild pain (1 - 3) or moderate pain (4 - 6)., Disp: , Rfl:   No current facility-administered medications for this encounter.      Performance Status:  The Karnofsky performance scale today is 90, Able to carry on normal activity; minor signs or symptoms of disease (ECOG equivalent 0).     OBJECTIVE  Physical Exam:  /73   Pulse 76   Temp 36 °C (96.8 °F) (Temporal)   Resp 18   Wt 75.2 kg (165 lb 11.2 oz)   SpO2 99%   BMI 27.15 kg/m²    Physical Exam  Constitutional:       General: She is not in acute distress.     Appearance: She is not ill-appearing.   HENT:      Head: Normocephalic and atraumatic.   Eyes:      General: No scleral icterus.  Cardiovascular:      Rate and Rhythm: Normal rate and regular rhythm.   Pulmonary:      Effort: Pulmonary effort is normal. No  respiratory distress.      Breath sounds: No wheezing.   Chest:      Chest wall: No tenderness.   Abdominal:      General: There is no distension.   Musculoskeletal:         General: No tenderness.      Cervical back: No rigidity.      Right lower leg: No edema.      Left lower leg: No edema.      Comments: Right ankle swelling.    Lymphadenopathy:      Cervical: No cervical adenopathy.   Skin:     Coloration: Skin is not jaundiced.   Neurological:      General: No focal deficit present.      Mental Status: She is alert and oriented to person, place, and time.      Cranial Nerves: No cranial nerve deficit.      Coordination: Coordination normal.      Comments: Ambulatory   Psychiatric:         Mood and Affect: Mood normal.         Behavior: Behavior normal.      Laboratory Review:  The pertinent lab results were reviewed and discussed with the patient.      ASSESSMENT:  Janette Panda is a 39 y.o. 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 A PET-CT has been completed today which reveals only minimal FDG avidity in the lesion. No concerning regional or distant metastatic disease. Biopsy is pending.    There is incidental finding of right inguinal LN and possible right pelvic side wall avidity from ovarian tissue. Patient has history of ongoing right calcaneal infection. Denies gynecological symptoms. Has had PAP smear in the last year.   Discussed with radiologist. Will order Pelvic US.    PLAN:    Ms. Panda's pertinent history, exam, imaging and pathology details were reviewed.   Accompanied by self.  Lives with her mom.     Treatment recommendations/Alternatives:  NCCN Guidelines  were applicable to guide this patients treatment plan.     We reviewed the standard of care management for a presumed new primary of the RUL including the role of SBRT/ high-dose hypofractionated IMRT. This is an alternate approach for patients who are high-risk surgical candidates or refusing surgery due to any reasons. Pros and cons of surgery vs SBRT/ high-dose hypofractionated IMRT reviewed, including high local control rates with radiation, but risk of future regional, kristine or distant relapse. Even in the setting of high-risk surgical comorbidities, radiation treatments can be delivered with high safety.    With these radiation approaches, the target volume is the involved lesion. The kristine regions are not included and will need to be followed closely.    Given the lesion is peripherally located, we will plan a 3-4 fraction course, every other day.    Radiation treatment logistics:  We then focused our attention regarding logistics, rationale and potential risks with radiation therapy. This will need an initial simulation/mapping that will involve a planning CT scan in treatment position followed by a 2-3 week planning period and then initiation of radiation treatments.     We then reviewed possible side effects (Acute and long-term). Common and uncommon side effects with risks as relevant for her  case were discussed.    After detailed discussion of risks/benefits/goals/alternatives, patient signed the informed consent. Paper consent taken due to EPIC issue. Will be scanned into media tab.    CT simulation will be arranged at the earliest, once biopsy is done.    Orders placed:  1) PFTs  2) Radonc intent to treat: SBRT  3) Ultrasound pelvis    Network location: The patient will be treated at the [facility] location. Simulation will be done at the [facility] location.    Pain Management:  No acute needs    Social Work:  No acute needs.  Patient's family will drop off and  for treatments. Lives 15 min  away.    Nutrition: Will be seen by Dietician.    Clinical Trials: None applicable.    LONGITUDINAL CARE, EXTRA EFFORT/ : The patient will be followed longitudinally by providers (including APPs) in the department of radiation oncology for monitoring treatment effects during and after radiation. Additional effort needed in the setting of prior radiation and coordination of care with medical oncology.     The patient was provided my contact information.     Melissa Hill MD, MMM  Senior Attending Physician, [facility]  Professor, [employer]   Our [place]: “To Heal, To Teach, To Discover.”  RN partner: [phone]/ [email or web address]    Phone (scheduling): [phone]/ [email or web address]  Proton Therapy (scheduling): [phone]/ [email or web address]  Phone (after hours): [phone]

## 2025-04-14 ENCOUNTER — OFFICE VISIT (OUTPATIENT)
Dept: HEMATOLOGY/ONCOLOGY | Facility: HOSPITAL | Age: 40
End: 2025-04-14
Payer: MEDICARE

## 2025-04-14 VITALS
HEART RATE: 90 BPM | TEMPERATURE: 97.9 F | BODY MASS INDEX: 27.02 KG/M2 | RESPIRATION RATE: 18 BRPM | WEIGHT: 164.9 LBS | DIASTOLIC BLOOD PRESSURE: 69 MMHG | SYSTOLIC BLOOD PRESSURE: 119 MMHG | OXYGEN SATURATION: 100 %

## 2025-04-14 DIAGNOSIS — C34.11 MALIGNANT NEOPLASM OF UPPER LOBE OF RIGHT LUNG (MULTI): ICD-10-CM

## 2025-04-14 PROCEDURE — 99214 OFFICE O/P EST MOD 30 MIN: CPT | Performed by: INTERNAL MEDICINE

## 2025-04-14 ASSESSMENT — PAIN SCALES - GENERAL: PAINLEVEL_OUTOF10: 7

## 2025-04-14 NOTE — PROGRESS NOTES
Patient ID: Janette Panda is a 39 y.o. female.    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 PROBABLE Non-small cell lung cancer (poorly differentiated squamous?) Date of diagnosis is August 13, 2020 from a core biopsy of the left posterolateral chest wall mass.  Pathology  reports extensively necrotic epithelioid proliferation in a fibrotic background.  Tumor cells were somewhat rhabdoid in appearance with hyperchromatic, eccentric, moderately to markedly atypical nuclei some with big nuclei and eosinophilic cytoplasm.   By immunohistochemistry tumors were positive for keratin AE1/AE3, CAM 5.2, CK7, calretinin and KATIE-3 and negative for CK20 WT 1, P 40, desmin, SOX-10, CD34, TTF-1, CDX 2, PAX 8, estrogen receptor.  INI immunostain retained nuclear expression.  The  differential diagnosis was a malignant epithelioid neoplasm including poorly differentiated carcinoma and mesothelioma.        STAGING     Stage IV disease, T1b (1.7 cm nodule within the right upper lobe), NX, M1C         CURRENT SITES OF DISEASE     RUL, left chest wall pleural based, left pleura, right infraclavicular LN, bilateral adrenal glands, right foot metatarsal bone  MRI of brain negative for malignancy        MOLECULAR GENOMICS     Insufficient tissue fotr NGS     1- CancerType ID Molecular Cancer :     Tumor type indeterminate. Cannot be excluded Squamous Cell 35%, Pancreatobiliary 31%, Ovary <5%     Ruled out with 95% confidence: adrenal, brain, cervix and endometrial adenoca, GE adeno, GIST, Germcell     Salivary, colorectal, Kidney, Hepatocellular, lung adeno, lymphoma, melanoma, mesotheliona, neuroendocrin     (including MERKEL and small cell), sarcoma, thymus, thyroid, bladder     2- TEMPUS ctDNA:   KRAS Q61H - 44.5% allelic frequency  CDKN2A H83Y Missense varialt - LOF - allelic frequency 22%  TP53 - R175H missense variant LOF  ARID1A LOF allelic frequency 24%  MSI high not  detected                                  3- PD-L1 IHC TPS: 90%        SERUM TUMOR MARKER     Normal CEA and CA 19.9 in September 2020        PRIOR THERAPY     1- XRT to left chest wall.  2- Palliative XRT to right foot  3- Single agent Keytruda (refused chemotherapy) based on high PD-L1 expression starting Nov 2, 2020. Clinical improvement, radiographic stable disease/pseudoprogression initially; evidence of response on imaging of April 2021.  Completed 2 years of treatment  in October 2022        CURRENT THERAPY     1- Supportive Oncology for symptom management  2- Observation  3-growing 8 mm nodule right upper lobe November 2024.  Consider follow-up CT in 3 months versus SBRT.  Imaging March 2025 show going to now 1 cm, PET scan positive, plan for biopsy, refer to radiation oncology for possible SBRT, suspect new primary.  Seen by radiation oncology April 2025.  Plan for SBRT        CURRENT ONCOLOGICAL PROBLEMS     1-severe chest pain related to her left chest wall mass - resolved  2-severe constipation - improved  3-scan on 11/15/2021 showed ground glass opacities, thought by radiology to represent pneumonitis- patient is asymptomatic.  Will hold treatment 11/22/2021 and re-assess in 3 weeks  3-anorexia and losing weight - much improved  4-anxiety - improved  5- Right foot pain from metastasis - improved . Right calcaneal surgery for malunion on September 8, 2022 postoperative infection.  Antibiotic treatments in December 2022. She has required to further right calcaneus wound irrigation debridement, removal  of implants operation on December 15, 2022 as well as further surgery on March 17, 2023.  She is required IV antibiotics for osteomyelitis and followed by infectious diseases.  Bone cultures with multiple organisms.  Osteomyelitis.  On chronic suppressive antibiotic therapy June 2024  6-right upper lobe solitary nodule, growing March 2025, now 1 cm, spiculated, suspect new primary, obtain PET scan, plan for  biopsy, refer to radiation oncology for possible SBRT        HISTORY OF PRESENT ILLNESS     This is a very nice 39-year-old patient.  She went to the [facility] emergency room on August 12, 2020 with severe back pain and a large lump that was painful.  When asking her when this started she states approximately 1 to 2 weeks prior to  this although she had been losing weight for several months and was having some on and off pain there when questioning her more carefully.  She underwent a imaging studies with a CT scan of the chest abdomen and pelvis on August 13, 2020 which demonstrated  a 1.7 cm opacity within the right upper lobe felt to be most likely extrapulmonary arising from the pleura or chest wall.  The mass had a lobulated appearance with a small focus of macroscopic fat but no associated calcification.  Furthermore a 2.7 cm  rim-enhancing hypodense lesion with surrounding inflammatory changes was seen within the posterior and lateral aspect of the left 9th-10th rib.  Within the abdomen a 2.9 cm heterogeneous nodule was seen within the right adrenal gland.  She undergoes a  CT-guided biopsy and fine-needle aspiration on August 13, 2020 showing malignant cells along the posterolateral chest wall mass on the left side.  The pathology as described above.  She was seen initially by medical oncology on September 4, 2020 but referred  to my clinic for further evaluation on the day of this visit on September 16, 2020 given the unclear site of origin and pathology.        PAST MEDICAL HISTORY     1- Appendectomy  2- Kidney stones        SOCIAL HISTORY     Patient is 35 years old, she is single, she has no children, she lives with her mother and sister, she lives in [facility].  She started at the age of 14 smoking and currently still smokes at the age of 35.  Smokes on average 2 packs a day for the  past 20 years.  She works in manufacturing        CURRENT MEDS     See med list        ALLERGIES      Tetracycline        FAMILY HISTORY     Mother had stage III ovarian cancer at the age of 51.  Grandmother on the father's side had 2 cancers one in the breast and one in the lung.          Subjective    Cancer  Pertinent negatives include no abdominal pain, anorexia, arthralgias, change in bowel habit, chest pain, chills, congestion, coughing, diaphoresis, fatigue, fever, headaches, joint swelling, myalgias, nausea, neck pain, numbness, rash, sore throat, swollen glands, urinary symptoms, vertigo, visual change, vomiting or weakness.       Patient is feeling well.  Her foot still bothers her but not worse than before, urinating well, bowel movements regular, no new headaches, no fever.  She has a biopsy planned of her lung on April 24.      Objective    BSA: 1.86 meters squared  /69 (BP Location: Left arm, Patient Position: Sitting, BP Cuff Size: Adult)   Pulse 90   Temp 36.6 °C (97.9 °F) (Temporal)   Resp 18   Wt 74.8 kg (164 lb 14.5 oz)   SpO2 100%   BMI 27.02 kg/m²      Physical Exam  Constitutional:       General: She is not in acute distress.     Appearance: Normal appearance. She is not ill-appearing, toxic-appearing or diaphoretic.   HENT:      Nose: No congestion or rhinorrhea.      Mouth/Throat:      Pharynx: No oropharyngeal exudate or posterior oropharyngeal erythema.   Eyes:      General: No scleral icterus.     Conjunctiva/sclera: Conjunctivae normal.   Cardiovascular:      Rate and Rhythm: Normal rate and regular rhythm.      Pulses: Normal pulses.      Heart sounds: Normal heart sounds. No murmur heard.     No friction rub. No gallop.   Pulmonary:      Effort: Pulmonary effort is normal. No respiratory distress.      Breath sounds: Normal breath sounds. No stridor. No wheezing, rhonchi or rales.   Chest:      Chest wall: No tenderness.   Abdominal:      General: Abdomen is flat. Bowel sounds are normal. There is no distension.      Palpations: Abdomen is soft. There is no mass.       Tenderness: There is no abdominal tenderness. There is no guarding or rebound.   Musculoskeletal:      Cervical back: Neck supple. No tenderness.      Right lower leg: No edema.      Left lower leg: No edema.   Lymphadenopathy:      Cervical: No cervical adenopathy.   Skin:     General: Skin is warm.      Coloration: Skin is not jaundiced.   Psychiatric:         Mood and Affect: Mood normal.         Behavior: Behavior normal.         Performance Status:  Symptomatic; fully ambulatory       Latest Reference Range & Units 04/10/25 12:37   GLUCOSE 74 - 99 mg/dL 71 (L)   SODIUM 136 - 145 mmol/L 137   POTASSIUM 3.5 - 5.3 mmol/L 4.1   CHLORIDE 98 - 107 mmol/L 104   Bicarbonate 21 - 32 mmol/L 25   Anion Gap 10 - 20 mmol/L 12   Blood Urea Nitrogen 6 - 23 mg/dL 6   Creatinine 0.50 - 1.05 mg/dL 0.79   EGFR >60 mL/min/1.73m*2 >90   Calcium 8.6 - 10.6 mg/dL 9.6   Albumin 3.4 - 5.0 g/dL 4.7   Alkaline Phosphatase 33 - 110 U/L 94   ALT 7 - 45 U/L 8   AST 9 - 39 U/L 16   Bilirubin Total 0.0 - 1.2 mg/dL 0.4   Total Protein 6.4 - 8.2 g/dL 8.5 (H)   C-Reactive Protein <1.00 mg/dL 0.27   Protime 9.8 - 12.4 seconds 10.9   INR 0.9 - 1.1  1.0   WBC 4.4 - 11.3 x10*3/uL 12.0 (H)   nRBC 0.0 - 0.0 /100 WBCs 0.0   RBC 4.00 - 5.20 x10*6/uL 4.39   HEMOGLOBIN 12.0 - 16.0 g/dL 12.1   HEMATOCRIT 36.0 - 46.0 % 38.4   MCV 80 - 100 fL 88   MCH 26.0 - 34.0 pg 27.6   MCHC 32.0 - 36.0 g/dL 31.5 (L)   RED CELL DISTRIBUTION WIDTH 11.5 - 14.5 % 14.4   Platelets 150 - 450 x10*3/uL 558 (H)   Neutrophils % 40.0 - 80.0 % 49.0   Immature Granulocytes %, Automated 0.0 - 0.9 % 0.2   Lymphocytes % 13.0 - 44.0 % 42.4   Monocytes % 2.0 - 10.0 % 6.3   Eosinophils % 0.0 - 6.0 % 1.8   Basophils % 0.0 - 2.0 % 0.3   Neutrophils Absolute 1.20 - 7.70 x10*3/uL 5.86   Immature Granulocytes Absolute, Automated 0.00 - 0.70 x10*3/uL 0.02   Lymphocytes Absolute 1.20 - 4.80 x10*3/uL 5.07 (H)   Monocytes Absolute 0.10 - 1.00 x10*3/uL 0.75   Eosinophils Absolute 0.00 - 0.70  x10*3/uL 0.21   Basophils Absolute 0.00 - 0.10 x10*3/uL 0.04       NM PET CT FDG WHOLEBODY; 4/10/2025   IMPRESSION:  1. Mildly FDG avid nodule in apical segment of right upper lobe.  Given interval increase in size compared to prior CT, concerning for  neoplastic process until proven otherwise. 6 mm pleural- based nodule  in the anterior right upper lobe is non FDG avid.  2. Post radiation changes within the left lower lobe with  non-specific mild diffuse FDG uptake.  3. FDG avid right inguinofemoral lymph nodes, likely non-specific.  Otherwise, no PET evidence of FDG avid kristine or distant metastasis.  4. FDG uptake in the right ovary, likely physiologic.          Assessment/Plan     We are suspecting that there is a new primary lung cancer in this patient.  This is based on a growing right upper lobe nodule.  Her PET scan shows uptake in this nodule but very importantly no evidence of recurrence or progression of her prior metastatic cancer.  Recall that she presented with a metastatic malignant epithelioid neoplasm with extensive necrosis that is believed to be a non-small cell lung cancer.  She obtained a complete response with the single agent immunotherapy and has been on observation for that cancer since she completed her treatment in October 2022.  She is planned for a biopsy of a right upper lobe nodule followed by potentially SBRT.  We have given her follow-up appointment in 2 months.    Cancer Staging   No matching staging information was found for the patient.      Oncology History    No history exists.                 Thaddeus Mayen MD

## 2025-04-17 ENCOUNTER — TELEPHONE (OUTPATIENT)
Dept: RADIATION ONCOLOGY | Facility: HOSPITAL | Age: 40
End: 2025-04-17
Payer: MEDICARE

## 2025-04-17 NOTE — TELEPHONE ENCOUNTER
Reason for Conversation  No chief complaint on file.    Background   Called and left message regarding scheduling her CT sim.  Left my desk phone number and the department phone number.      Disposition   No disposition on file.

## 2025-04-18 ENCOUNTER — HOSPITAL ENCOUNTER (OUTPATIENT)
Dept: RADIOLOGY | Facility: HOSPITAL | Age: 40
Discharge: HOME | End: 2025-04-18
Payer: MEDICARE

## 2025-04-18 VITALS
RESPIRATION RATE: 16 BRPM | OXYGEN SATURATION: 100 % | SYSTOLIC BLOOD PRESSURE: 111 MMHG | DIASTOLIC BLOOD PRESSURE: 74 MMHG | TEMPERATURE: 97.3 F | HEART RATE: 71 BPM

## 2025-04-18 DIAGNOSIS — C34.11 MALIGNANT NEOPLASM OF UPPER LOBE OF RIGHT LUNG (MULTI): ICD-10-CM

## 2025-04-18 PROCEDURE — 2720000007 HC OR 272 NO HCPCS

## 2025-04-18 PROCEDURE — 71045 X-RAY EXAM CHEST 1 VIEW: CPT

## 2025-04-18 PROCEDURE — 99152 MOD SED SAME PHYS/QHP 5/>YRS: CPT | Performed by: RADIOLOGY

## 2025-04-18 PROCEDURE — 7100000009 HC PHASE TWO TIME - INITIAL BASE CHARGE

## 2025-04-18 PROCEDURE — 2500000004 HC RX 250 GENERAL PHARMACY W/ HCPCS (ALT 636 FOR OP/ED): Performed by: RADIOLOGY

## 2025-04-18 PROCEDURE — 32408 CORE NDL BX LNG/MED PERQ: CPT

## 2025-04-18 PROCEDURE — 99152 MOD SED SAME PHYS/QHP 5/>YRS: CPT

## 2025-04-18 RX ORDER — MIDAZOLAM HYDROCHLORIDE 1 MG/ML
INJECTION INTRAMUSCULAR; INTRAVENOUS
Status: COMPLETED | OUTPATIENT
Start: 2025-04-18 | End: 2025-04-18

## 2025-04-18 RX ORDER — ONDANSETRON HYDROCHLORIDE 2 MG/ML
4 INJECTION, SOLUTION INTRAVENOUS ONCE
Status: DISCONTINUED | OUTPATIENT
Start: 2025-04-18 | End: 2025-04-19 | Stop reason: HOSPADM

## 2025-04-18 RX ORDER — FENTANYL CITRATE 50 UG/ML
INJECTION, SOLUTION INTRAMUSCULAR; INTRAVENOUS
Status: COMPLETED | OUTPATIENT
Start: 2025-04-18 | End: 2025-04-18

## 2025-04-18 RX ADMIN — FENTANYL CITRATE 50 MCG: 50 INJECTION, SOLUTION INTRAMUSCULAR; INTRAVENOUS at 11:40

## 2025-04-18 RX ADMIN — MIDAZOLAM HYDROCHLORIDE 1 MG: 2 INJECTION, SOLUTION INTRAMUSCULAR; INTRAVENOUS at 11:40

## 2025-04-18 RX ADMIN — MIDAZOLAM HYDROCHLORIDE 1 MG: 2 INJECTION, SOLUTION INTRAMUSCULAR; INTRAVENOUS at 11:45

## 2025-04-18 RX ADMIN — FENTANYL CITRATE 50 MCG: 50 INJECTION, SOLUTION INTRAMUSCULAR; INTRAVENOUS at 11:45

## 2025-04-18 ASSESSMENT — PAIN SCALES - GENERAL

## 2025-04-18 NOTE — Clinical Note
R sided lung bx complete. 2 mg of versed and 100 mcg of fentanyl given throughout procedure. VSS. Dressing C/D/I with dermabond. Report given to RPCU RN. Pt to receive CXR now and in 2 hours., Recovery for 3 hr per MD

## 2025-04-18 NOTE — PRE-PROCEDURE NOTE
Interventional Radiology Preprocedure Note    Indication for procedure: The encounter diagnosis was Malignant neoplasm of upper lobe of right lung (Multi).    Relevant review of systems: NA    Relevant Labs:   Lab Results   Component Value Date    CREATININE 0.79 04/10/2025    EGFR >90 04/10/2025    INR 1.0 04/10/2025    PROTIME 10.9 04/10/2025    PREGTESTUR Negative 08/23/2024       Planned Sedation/Anesthesia: Moderate    Airway assessment: normal    Directed physical examination:    Ao, calm    Mallampati: I (soft palate, uvula, fauces, and tonsillar pillars visible)    ASA Score: ASA 2 - Patient with mild systemic disease with no functional limitations    Benefits, risks and alternatives of procedure and planned sedation have been discussed with the patient and/or their representative. All questions answered and they agree to proceed.

## 2025-04-18 NOTE — POST-PROCEDURE NOTE
Interventional Radiology Brief Postprocedure Note    Attending: Dr. Mcdonnell    Assistant: Dr. Newell    Diagnosis: lung nodule    Description of procedure:     Using CT guidance, a total of 1 pass was made into a right upper. lobe nodule using a 18 Gauge BARD needle passed through a 17 gauge coaxial system. Scanning after the pass demonstrated no bleeding. CXRs were ordered immediately and at 2 and 3 hrs postprocedure.     The postprocedural CXRs demonstrated a small right apical pneumothorax that slightly increased in size at the 2 hr sandeep and was similar in size at 3 hrs. The patient was asymptomatic during the postprocedural period in the recovery room. Patient was discharged in stable condition with instructions to report to nearest provider/ED/urgent care if symptoms/shortness of breath arose.     See radiology report for full details.       Anesthesia:  Local and MAC Moderate    Complications:  small right apical pneumothorax as above.    Estimated Blood Loss: minimal    Medications (Filter: Administrations occurring from 1130 to 1200 on 04/18/25) As of 04/18/25 1200      fentaNYL PF (Sublimaze) injection (mcg) Total dose:  100 mcg      Date/Time Rate/Dose/Volume Action       04/18/25  1140 50 mcg Given      1145 50 mcg Given               midazolam (Versed) injection (mg) Total dose:  2 mg      Date/Time Rate/Dose/Volume Action       04/18/25  1140 1 mg Given      1145 1 mg Given                   See detailed result report with images in PACS.    The patient tolerated the procedure well without incident or complication and is in stable condition.

## 2025-04-19 ASSESSMENT — ENCOUNTER SYMPTOMS
CHANGE IN BOWEL HABIT: 0
ANOREXIA: 0
SORE THROAT: 0
WEAKNESS: 0
JOINT SWELLING: 0
FEVER: 0
CHILLS: 0
MYALGIAS: 0
NAUSEA: 0
NUMBNESS: 0
DIAPHORESIS: 0
HEADACHES: 0
VOMITING: 0
COUGH: 0
VISUAL CHANGE: 0
FATIGUE: 0
VERTIGO: 0
NECK PAIN: 0
ABDOMINAL PAIN: 0
SWOLLEN GLANDS: 0
ARTHRALGIAS: 0

## 2025-04-21 ENCOUNTER — TELEPHONE (OUTPATIENT)
Dept: ADMISSION | Facility: HOSPITAL | Age: 40
End: 2025-04-21
Payer: MEDICARE

## 2025-04-21 DIAGNOSIS — C34.90 ADENOCARCINOMA, LUNG, UNSPECIFIED LATERALITY (MULTI): ICD-10-CM

## 2025-04-21 RX ORDER — OXYCODONE HYDROCHLORIDE 10 MG/1
10 TABLET ORAL EVERY 4 HOURS PRN
Qty: 90 TABLET | Refills: 0 | Status: SHIPPED | OUTPATIENT
Start: 2025-04-21

## 2025-04-21 NOTE — TELEPHONE ENCOUNTER
Reason for Conversation  Med Refill    Background   Pt calling again, asking when refill will be completed.

## 2025-04-21 NOTE — TELEPHONE ENCOUNTER
Janette called to check if refill for her Oxycodone 10mg was placed to pharmacy today?  I told her the nurse on refill line put the request through this morning and once her team places the RX I will call her and let her know. Messaged team.

## 2025-04-21 NOTE — TELEPHONE ENCOUNTER
I called Janette and let her know the Oxycodone 10 mg, Immediate Release tablets RX has been placed to [facility] on [facility] in [place]. She said Thank you and nothing further needed at this time.

## 2025-04-21 NOTE — TELEPHONE ENCOUNTER
Janette Panda called the refill line for Oxycodone 10mg. Would like refills to be sent to [facility] pharmacy on file. Message sent to team to send in.

## 2025-04-23 LAB
LABORATORY COMMENT REPORT: NORMAL
PATH REPORT.COMMENTS IMP SPEC: NORMAL
PATH REPORT.FINAL DX SPEC: NORMAL
PATH REPORT.GROSS SPEC: NORMAL
PATH REPORT.RELEVANT HX SPEC: NORMAL
PATH REPORT.TOTAL CANCER: NORMAL

## 2025-04-25 ENCOUNTER — DOCUMENTATION (OUTPATIENT)
Dept: RADIATION ONCOLOGY | Facility: CLINIC | Age: 40
End: 2025-04-25
Payer: MEDICARE

## 2025-04-25 DIAGNOSIS — C34.11 MALIGNANT NEOPLASM OF UPPER LOBE OF RIGHT LUNG (MULTI): Primary | ICD-10-CM

## 2025-04-25 NOTE — PROGRESS NOTES
MULTIDISCIPLINARY TUMOR BOARD CONFERENCE NOTE  Janette Panda was presented at the Tumor Board Conference  Conference date: 4/25/2025  Presenting Provider(s): Charlene Liu  Present at Conference: Medical Oncology, Radiation Oncology, Surgical Oncology, Radiology, and Pathology Representatives  Conference Review Type: Radiology Review and Treatment Plan Review     Impression:      Janette Panda is a 39 y.o. female with  known diagnosis of metastatic keratin positive malignant epithelioid neoplasm with extensive necrosis likely non-small cell lung cancer origin, treated with palliative radiation and Keytruda [id number], who is now presenting for evaluation and management of enlarging right upper lobe solitary nodule, growing March 2025, now 1 cm, spiculated, suspect new primary. A PET-CT has been completed today which reveals only minimal FDG avidity in the lesion. No concerning regional or distant metastatic disease. Biopsy is negative.     RECOMMENDATIONS:    Consensus was ok to proceed with SBRT, despite negative biopsy, given a 25% likelihood of false negative with the current size, and the nodule appearance and tracked growth.    Cancer Staging:  Cancer Staging   No matching staging information was found for the patient.       Disclaimer  SCC tumor board recommendations represent the consensus opinion of physicians present at a weekly patient care conference. The treating SCC physician is not always present, and many of the physicians formulating the recommendation have not personally seen or examined the patient under discussion. It is understood that the treating SCC physician considers the expertise of the Tumor Board Recommendation in formulating his/her plan for the patient. However, in many situations, based on individualized patient considerations, a different plan is determined by the treating physician to be the optimal medical management.

## 2025-04-28 ENCOUNTER — HOSPITAL ENCOUNTER (OUTPATIENT)
Dept: RADIOLOGY | Facility: EXTERNAL LOCATION | Age: 40
Discharge: HOME | End: 2025-04-28

## 2025-04-28 ENCOUNTER — HOSPITAL ENCOUNTER (OUTPATIENT)
Dept: RADIATION ONCOLOGY | Facility: HOSPITAL | Age: 40
Setting detail: RADIATION/ONCOLOGY SERIES
Discharge: HOME | End: 2025-04-28
Payer: MEDICARE

## 2025-04-28 ENCOUNTER — TELEPHONE (OUTPATIENT)
Dept: RADIATION ONCOLOGY | Facility: HOSPITAL | Age: 40
End: 2025-04-28

## 2025-04-28 ENCOUNTER — APPOINTMENT (OUTPATIENT)
Dept: PLASTIC SURGERY | Facility: CLINIC | Age: 40
End: 2025-04-28
Payer: MEDICARE

## 2025-04-28 VITALS
DIASTOLIC BLOOD PRESSURE: 75 MMHG | WEIGHT: 158 LBS | BODY MASS INDEX: 26.33 KG/M2 | HEIGHT: 65 IN | HEART RATE: 87 BPM | RESPIRATION RATE: 16 BRPM | SYSTOLIC BLOOD PRESSURE: 107 MMHG

## 2025-04-28 DIAGNOSIS — C34.11 MALIGNANT NEOPLASM OF UPPER LOBE, RIGHT BRONCHUS OR LUNG: ICD-10-CM

## 2025-04-28 DIAGNOSIS — Z01.818 PRE-OP EVALUATION: ICD-10-CM

## 2025-04-28 DIAGNOSIS — C34.11 MALIGNANT NEOPLASM OF UPPER LOBE, RIGHT BRONCHUS OR LUNG: Primary | ICD-10-CM

## 2025-04-28 PROCEDURE — 77334 RADIATION TREATMENT AID(S): CPT

## 2025-04-28 PROCEDURE — 77290 THER RAD SIMULAJ FIELD CPLX: CPT

## 2025-04-28 PROCEDURE — 3008F BODY MASS INDEX DOCD: CPT | Performed by: PHYSICIAN ASSISTANT

## 2025-04-28 PROCEDURE — 99204 OFFICE O/P NEW MOD 45 MIN: CPT | Performed by: PHYSICIAN ASSISTANT

## 2025-04-28 ASSESSMENT — PAIN SCALES - GENERAL: PAINLEVEL_OUTOF10: 4

## 2025-04-28 NOTE — TELEPHONE ENCOUNTER
Reason for Conversation  No chief complaint on file.    Background   Called patient to confirm that she is coming for her CT sim today, even though she looked at the negative path report.  Pt. Confirmed that she will be here.      Disposition   No disposition on file.

## 2025-04-28 NOTE — PROGRESS NOTES
Janette Panda  [id number]  1985 04/28/25    I met with Ms. Panda today to discuss the tumor board recommendations regarding the biopsy results.  She was understanding of the discussion and proceeded with CT simulation as planned.  We will initiate SBRT to the right upper lung lesion in the next 2 to 3 weeks.    Ultrasound of the pelvis has been scheduled for 5/2/2025.      Melissa Hill MD, MMM  Senior Attending Physician, [facility]  Professor, [employer]   Our [place]: “To Heal, To Teach, To Discover.”  RN partner: [phone]/ [email or web address]    Phone (scheduling): [phone]  Proton Therapy (scheduling): [phone]/ [email or web address]  Phone (after hours): [phone]

## 2025-04-29 ENCOUNTER — SOCIAL WORK (OUTPATIENT)
Dept: CASE MANAGEMENT | Facility: HOSPITAL | Age: 40
End: 2025-04-29
Payer: MEDICARE

## 2025-04-29 NOTE — PROGRESS NOTES
This SW rec'd request from rad onc NP to discuss with pt any transportation options for upcoming RT, as her vehicle is not running currently. This SW called pt's ins ([place] [employer]/Medicaid) and learned that pt does have coverage with [id number]  for 96 one-way rides per calendar year. They request 48 hour notice for routine rides, but can make exceptions for RT or chemo appts. Staff had a couple of tips for pt's first time calling since pt's info was a bit difficult to find in their system. SW followed a secure email with a call letting pt know that the email was sent, and to let SW know if any questions. Pt appreciated the info. SW to continue to follow as needed.

## 2025-05-02 ENCOUNTER — APPOINTMENT (OUTPATIENT)
Dept: HEMATOLOGY/ONCOLOGY | Facility: HOSPITAL | Age: 40
End: 2025-05-02
Payer: MEDICARE

## 2025-05-02 ENCOUNTER — APPOINTMENT (OUTPATIENT)
Dept: RADIOLOGY | Facility: HOSPITAL | Age: 40
End: 2025-05-02
Payer: MEDICARE

## 2025-05-08 ENCOUNTER — APPOINTMENT (OUTPATIENT)
Dept: RADIATION ONCOLOGY | Facility: HOSPITAL | Age: 40
End: 2025-05-08
Payer: MEDICARE

## 2025-05-08 PROCEDURE — 77295 3-D RADIOTHERAPY PLAN: CPT

## 2025-05-08 PROCEDURE — 77334 RADIATION TREATMENT AID(S): CPT | Performed by: RADIOLOGY

## 2025-05-08 PROCEDURE — 77334 RADIATION TREATMENT AID(S): CPT

## 2025-05-08 PROCEDURE — 77293 RESPIRATOR MOTION MGMT SIMUL: CPT

## 2025-05-08 PROCEDURE — 77300 RADIATION THERAPY DOSE PLAN: CPT

## 2025-05-08 PROCEDURE — 77295 3-D RADIOTHERAPY PLAN: CPT | Performed by: RADIOLOGY

## 2025-05-08 PROCEDURE — 77300 RADIATION THERAPY DOSE PLAN: CPT | Performed by: RADIOLOGY

## 2025-05-08 PROCEDURE — 77293 RESPIRATOR MOTION MGMT SIMUL: CPT | Performed by: RADIOLOGY

## 2025-05-12 ENCOUNTER — HOSPITAL ENCOUNTER (OUTPATIENT)
Dept: RADIATION ONCOLOGY | Facility: HOSPITAL | Age: 40
Setting detail: RADIATION/ONCOLOGY SERIES
Discharge: HOME | End: 2025-05-12
Payer: MEDICARE

## 2025-05-12 VITALS
BODY MASS INDEX: 27.24 KG/M2 | TEMPERATURE: 96.8 F | HEART RATE: 88 BPM | WEIGHT: 163.7 LBS | RESPIRATION RATE: 18 BRPM | OXYGEN SATURATION: 98 % | DIASTOLIC BLOOD PRESSURE: 70 MMHG | SYSTOLIC BLOOD PRESSURE: 113 MMHG

## 2025-05-12 DIAGNOSIS — C34.11 MALIGNANT NEOPLASM OF UPPER LOBE, RIGHT BRONCHUS OR LUNG: ICD-10-CM

## 2025-05-12 LAB
RAD ONC MSQ ACTUAL FRACTIONS DELIVERED: 1
RAD ONC MSQ ACTUAL SESSION DELIVERED DOSE: 1250 CGRAY
RAD ONC MSQ ACTUAL TOTAL DOSE: 1250 CGRAY
RAD ONC MSQ ELAPSED DAYS: 0
RAD ONC MSQ LAST DATE: NORMAL
RAD ONC MSQ PRESCRIBED FRACTIONAL DOSE: 1250 CGRAY
RAD ONC MSQ PRESCRIBED NUMBER OF FRACTIONS: 4
RAD ONC MSQ PRESCRIBED TECHNIQUE: NORMAL
RAD ONC MSQ PRESCRIBED TOTAL DOSE: 5000 CGRAY
RAD ONC MSQ PRESCRIPTION PATTERN COMMENT: NORMAL
RAD ONC MSQ START DATE: NORMAL
RAD ONC MSQ TREATMENT COURSE NUMBER: 3
RAD ONC MSQ TREATMENT SITE: NORMAL

## 2025-05-12 PROCEDURE — 77373 STRTCTC BDY RAD THER TX DLVR: CPT

## 2025-05-12 PROCEDURE — 77280 THER RAD SIMULAJ FIELD SMPL: CPT

## 2025-05-12 ASSESSMENT — PAIN SCALES - GENERAL: PAINLEVEL_OUTOF10: 0-NO PAIN

## 2025-05-14 ENCOUNTER — HOSPITAL ENCOUNTER (OUTPATIENT)
Dept: RADIATION ONCOLOGY | Facility: HOSPITAL | Age: 40
Setting detail: RADIATION/ONCOLOGY SERIES
Discharge: HOME | End: 2025-05-14
Payer: MEDICARE

## 2025-05-14 DIAGNOSIS — Z51.0 ENCOUNTER FOR ANTINEOPLASTIC RADIATION THERAPY: ICD-10-CM

## 2025-05-14 DIAGNOSIS — C34.11 MALIGNANT NEOPLASM OF UPPER LOBE, RIGHT BRONCHUS OR LUNG: ICD-10-CM

## 2025-05-14 LAB
RAD ONC MSQ ACTUAL FRACTIONS DELIVERED: 2
RAD ONC MSQ ACTUAL SESSION DELIVERED DOSE: 1250 CGRAY
RAD ONC MSQ ACTUAL TOTAL DOSE: 2500 CGRAY
RAD ONC MSQ ELAPSED DAYS: 2
RAD ONC MSQ LAST DATE: NORMAL
RAD ONC MSQ PRESCRIBED FRACTIONAL DOSE: 1250 CGRAY
RAD ONC MSQ PRESCRIBED NUMBER OF FRACTIONS: 4
RAD ONC MSQ PRESCRIBED TECHNIQUE: NORMAL
RAD ONC MSQ PRESCRIBED TOTAL DOSE: 5000 CGRAY
RAD ONC MSQ PRESCRIPTION PATTERN COMMENT: NORMAL
RAD ONC MSQ START DATE: NORMAL
RAD ONC MSQ TREATMENT COURSE NUMBER: 3
RAD ONC MSQ TREATMENT SITE: NORMAL

## 2025-05-14 PROCEDURE — 77336 RADIATION PHYSICS CONSULT: CPT

## 2025-05-14 PROCEDURE — 77373 STRTCTC BDY RAD THER TX DLVR: CPT

## 2025-05-14 NOTE — PROGRESS NOTES
Radiation Oncology On Treatment Visit    Patient Name:  Janette Panda  MRN:  [id number]  :  1985    Referring Provider: Thaddeus Mayen MD  Primary Care Provider: Sylvie Allison MD  Care Team:   Patient Care Team:  Sylvie Allison MD as PCP - General (Family Medicine)  JUNE Sheffield-CNS as PCP - [employer] Medicare Advantage PCP  Thaddeus Mayen MD as Consulting Physician (Hematology and Oncology)  Tyra Barrientos LPN as Licensed Practical Nurse (Orthopaedic Surgery)  José Miguel Lee MD as Surgeon (Orthopaedic Surgery)  Melissa Hill MD as Radiation Oncologist (Radiation Oncology)    Date of Service: 2025     Diagnosis:   Specialty Problems    None    Treatment Summary:  SBRT: Right Lung or bronchus    Treatment Period Technique Fraction Dose Fractions Total Dose   Course 3 2025-2025  (days elapsed: 0)         RUL 2025-2025 SBRT 1,250 / 1,250 cGy  / 4 1,250 / 5,000 cGy     SUBJECTIVE: Tolerated first treatment well. No worsening symptoms since consultation.   Vaginal US was canceled and rescheduled by patient.      OBJECTIVE:   Vital Signs:  /70   Pulse 88   Temp 36 °C (96.8 °F) (Temporal)   Resp 18   Wt 74.3 kg (163 lb 11.2 oz)   LMP  (LMP Unknown)   SpO2 98%   BMI 27.24 kg/m²     Other Pertinent Findings: Sitting comfortably. Alert, oriented. Breathing well on room air, no wheezing, ambulatory.     Toxicity Assessment          2025    14:43   Toxicity Assessment   Adverse Events Reviewed (WDL) No (Exceptions to WDL)   Treatment Site Thoracic   Anorexia Grade 0   Dermatitis Radiation Grade 0   Fatigue Grade 0   Nausea Grade 0   Pain Grade 0   Cough Grade 0   Dyspnea Grade 0   Hypoxia Grade 0          Assessment / Plan:  Dosimetry/IGRT reviewed.  The patient is tolerating radiation therapy as anticipated.  Continue per current treatment plan.     Will coordinate imaging follow up with Dr. Mayen.  Patient will follow up on Vaginal US and upcoming Gyn  michaelt.        Melissa Hill MD, MMM  Senior Attending Physician, [facility]  Professor, [employer]   Our [place]: “To Heal, To Teach, To Discover.”  RN partner: [phone]/ [email or web address]    Phone (scheduling): [phone]  Proton Therapy (scheduling): [phone]/ [email or web address]  Phone (after hours): [phone]

## 2025-05-16 ENCOUNTER — HOSPITAL ENCOUNTER (OUTPATIENT)
Dept: RADIATION ONCOLOGY | Facility: HOSPITAL | Age: 40
Setting detail: RADIATION/ONCOLOGY SERIES
Discharge: HOME | End: 2025-05-16
Payer: MEDICARE

## 2025-05-16 DIAGNOSIS — C34.11 MALIGNANT NEOPLASM OF UPPER LOBE, RIGHT BRONCHUS OR LUNG: ICD-10-CM

## 2025-05-16 DIAGNOSIS — Z51.0 ENCOUNTER FOR ANTINEOPLASTIC RADIATION THERAPY: ICD-10-CM

## 2025-05-16 LAB
RAD ONC MSQ ACTUAL FRACTIONS DELIVERED: 3
RAD ONC MSQ ACTUAL SESSION DELIVERED DOSE: 1250 CGRAY
RAD ONC MSQ ACTUAL TOTAL DOSE: 3750 CGRAY
RAD ONC MSQ ELAPSED DAYS: 4
RAD ONC MSQ LAST DATE: NORMAL
RAD ONC MSQ PRESCRIBED FRACTIONAL DOSE: 1250 CGRAY
RAD ONC MSQ PRESCRIBED NUMBER OF FRACTIONS: 4
RAD ONC MSQ PRESCRIBED TECHNIQUE: NORMAL
RAD ONC MSQ PRESCRIBED TOTAL DOSE: 5000 CGRAY
RAD ONC MSQ PRESCRIPTION PATTERN COMMENT: NORMAL
RAD ONC MSQ START DATE: NORMAL
RAD ONC MSQ TREATMENT COURSE NUMBER: 3
RAD ONC MSQ TREATMENT SITE: NORMAL

## 2025-05-16 PROCEDURE — 77373 STRTCTC BDY RAD THER TX DLVR: CPT

## 2025-05-19 ENCOUNTER — TELEPHONE (OUTPATIENT)
Dept: ADMISSION | Facility: HOSPITAL | Age: 40
End: 2025-05-19
Payer: MEDICARE

## 2025-05-19 ENCOUNTER — DOCUMENTATION (OUTPATIENT)
Dept: RADIATION ONCOLOGY | Facility: HOSPITAL | Age: 40
End: 2025-05-19
Payer: MEDICARE

## 2025-05-19 ENCOUNTER — HOSPITAL ENCOUNTER (OUTPATIENT)
Dept: RADIATION ONCOLOGY | Facility: HOSPITAL | Age: 40
Setting detail: RADIATION/ONCOLOGY SERIES
Discharge: HOME | End: 2025-05-19
Payer: MEDICARE

## 2025-05-19 DIAGNOSIS — C34.90 ADENOCARCINOMA, LUNG, UNSPECIFIED LATERALITY (MULTI): ICD-10-CM

## 2025-05-19 DIAGNOSIS — Z51.0 ENCOUNTER FOR ANTINEOPLASTIC RADIATION THERAPY: ICD-10-CM

## 2025-05-19 DIAGNOSIS — C34.11 MALIGNANT NEOPLASM OF UPPER LOBE, RIGHT BRONCHUS OR LUNG: ICD-10-CM

## 2025-05-19 LAB
RAD ONC MSQ ACTUAL FRACTIONS DELIVERED: 4
RAD ONC MSQ ACTUAL SESSION DELIVERED DOSE: 1250 CGRAY
RAD ONC MSQ ACTUAL TOTAL DOSE: 5000 CGRAY
RAD ONC MSQ ELAPSED DAYS: 7
RAD ONC MSQ LAST DATE: NORMAL
RAD ONC MSQ PRESCRIBED FRACTIONAL DOSE: 1250 CGRAY
RAD ONC MSQ PRESCRIBED NUMBER OF FRACTIONS: 4
RAD ONC MSQ PRESCRIBED TECHNIQUE: NORMAL
RAD ONC MSQ PRESCRIBED TOTAL DOSE: 5000 CGRAY
RAD ONC MSQ PRESCRIPTION PATTERN COMMENT: NORMAL
RAD ONC MSQ START DATE: NORMAL
RAD ONC MSQ TREATMENT COURSE NUMBER: 3
RAD ONC MSQ TREATMENT SITE: NORMAL

## 2025-05-19 PROCEDURE — 77373 STRTCTC BDY RAD THER TX DLVR: CPT

## 2025-05-19 RX ORDER — OXYCODONE HYDROCHLORIDE 10 MG/1
10 TABLET ORAL EVERY 4 HOURS PRN
Qty: 90 TABLET | Refills: 0 | Status: SHIPPED | OUTPATIENT
Start: 2025-05-19

## 2025-05-19 NOTE — TELEPHONE ENCOUNTER
Janette Panda called the refill line for Oxycodone 10mg. Would like refills to be sent to [facility] pharmacy on file. Message sent to team to send in.  Next FUV 6/9.

## 2025-06-09 ENCOUNTER — PATIENT MESSAGE (OUTPATIENT)
Dept: ORTHOPEDIC SURGERY | Facility: CLINIC | Age: 40
End: 2025-06-09

## 2025-06-09 ENCOUNTER — OFFICE VISIT (OUTPATIENT)
Dept: HEMATOLOGY/ONCOLOGY | Facility: HOSPITAL | Age: 40
End: 2025-06-09
Payer: MEDICARE

## 2025-06-09 VITALS
SYSTOLIC BLOOD PRESSURE: 105 MMHG | TEMPERATURE: 97.5 F | RESPIRATION RATE: 18 BRPM | BODY MASS INDEX: 26.89 KG/M2 | DIASTOLIC BLOOD PRESSURE: 66 MMHG | OXYGEN SATURATION: 100 % | WEIGHT: 161.6 LBS | HEART RATE: 76 BPM

## 2025-06-09 DIAGNOSIS — M86.171 ACUTE OSTEOMYELITIS OF RIGHT CALCANEUS (MULTI): ICD-10-CM

## 2025-06-09 DIAGNOSIS — S92.031P: ICD-10-CM

## 2025-06-09 DIAGNOSIS — C34.11 MALIGNANT NEOPLASM OF UPPER LOBE OF RIGHT LUNG (MULTI): ICD-10-CM

## 2025-06-09 DIAGNOSIS — S92.001P: ICD-10-CM

## 2025-06-09 DIAGNOSIS — Z01.818 ENCOUNTER FOR OTHER PREPROCEDURAL EXAMINATION: Primary | ICD-10-CM

## 2025-06-09 DIAGNOSIS — T81.89XD DRAINING POSTOPERATIVE WOUND, SUBSEQUENT ENCOUNTER: ICD-10-CM

## 2025-06-09 PROCEDURE — 99215 OFFICE O/P EST HI 40 MIN: CPT | Performed by: INTERNAL MEDICINE

## 2025-06-09 PROCEDURE — 1036F TOBACCO NON-USER: CPT | Performed by: INTERNAL MEDICINE

## 2025-06-09 ASSESSMENT — ENCOUNTER SYMPTOMS
ABDOMINAL PAIN: 0
MYALGIAS: 0
COUGH: 0
FEVER: 0
ARTHRALGIAS: 0
VERTIGO: 0
VOMITING: 0
SWOLLEN GLANDS: 0
CHANGE IN BOWEL HABIT: 0
FATIGUE: 0
JOINT SWELLING: 0
DIAPHORESIS: 0
NAUSEA: 0
ANOREXIA: 0
CHILLS: 0
HEADACHES: 0
NUMBNESS: 0
NECK PAIN: 0
VISUAL CHANGE: 0
WEAKNESS: 0
SORE THROAT: 0

## 2025-06-09 ASSESSMENT — PAIN SCALES - GENERAL: PAINLEVEL_OUTOF10: 6

## 2025-06-09 NOTE — PROGRESS NOTES
Patient ID: Janette Panda is a 39 y.o. female.    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 PROBABLE Non-small cell lung cancer (poorly differentiated squamous?) Date of diagnosis is August 13, 2020 from a core biopsy of the left posterolateral chest wall mass.  Pathology  reports extensively necrotic epithelioid proliferation in a fibrotic background.  Tumor cells were somewhat rhabdoid in appearance with hyperchromatic, eccentric, moderately to markedly atypical nuclei some with big nuclei and eosinophilic cytoplasm.   By immunohistochemistry tumors were positive for keratin AE1/AE3, CAM 5.2, CK7, calretinin and KATIE-3 and negative for CK20 WT 1, P 40, desmin, SOX-10, CD34, TTF-1, CDX 2, PAX 8, estrogen receptor.  INI immunostain retained nuclear expression.  The  differential diagnosis was a malignant epithelioid neoplasm including poorly differentiated carcinoma and mesothelioma.        STAGING     Stage IV disease, T1b (1.7 cm nodule within the right upper lobe), NX, M1C         CURRENT SITES OF DISEASE     RUL, left chest wall pleural based, left pleura, right infraclavicular LN, bilateral adrenal glands, right foot metatarsal bone  MRI of brain negative for malignancy        MOLECULAR GENOMICS     Insufficient tissue fotr NGS     1- CancerType ID Molecular Cancer :     Tumor type indeterminate. Cannot be excluded Squamous Cell 35%, Pancreatobiliary 31%, Ovary <5%     Ruled out with 95% confidence: adrenal, brain, cervix and endometrial adenoca, GE adeno, GIST, Germcell     Salivary, colorectal, Kidney, Hepatocellular, lung adeno, lymphoma, melanoma, mesotheliona, neuroendocrin     (including MERKEL and small cell), sarcoma, thymus, thyroid, bladder     2- TEMPUS ctDNA:   KRAS Q61H - 44.5% allelic frequency  CDKN2A H83Y Missense varialt - LOF - allelic frequency 22%  TP53 - R175H missense variant LOF  ARID1A LOF allelic frequency 24%  MSI high not  detected                                  3- PD-L1 IHC TPS: 90%        SERUM TUMOR MARKER     Normal CEA and CA 19.9 in September 2020        PRIOR THERAPY     1- XRT to left chest wall.  2- Palliative XRT to right foot  3- Single agent Keytruda (refused chemotherapy) based on high PD-L1 expression starting Nov 2, 2020. Clinical improvement, radiographic stable disease/pseudoprogression initially; evidence of response on imaging of April 2021.  Completed 2 years of treatment  in October 2022  4-SBRT to growing right upper lobe nodule.  1 cm nodule March 2025.  S/p SBRT 50 Gy in 4 fractions from May 12 through 19, 2025       CURRENT THERAPY     1- Supportive Oncology for symptom management  2- Observation        CURRENT ONCOLOGICAL PROBLEMS     1-severe chest pain related to her left chest wall mass - resolved  2-severe constipation - improved  3-scan on 11/15/2021 showed ground glass opacities, thought by radiology to represent pneumonitis- patient is asymptomatic.  Will hold treatment 11/22/2021 and re-assess in 3 weeks  3-anorexia and losing weight - much improved  4-anxiety - improved  5- Right foot pain from metastasis - improved . Right calcaneal surgery for malunion on September 8, 2022 postoperative infection.  Antibiotic treatments in December 2022. She has required to further right calcaneus wound irrigation debridement, removal  of implants operation on December 15, 2022 as well as further surgery on March 17, 2023.  She is required IV antibiotics for osteomyelitis and followed by infectious diseases.  Bone cultures with multiple organisms.  Osteomyelitis.  On chronic suppressive antibiotic therapy June 2024  6-right upper lobe solitary nodule, growing March 2025, now 1 cm, spiculated, suspect new primary, obtain PET scan, plan for biopsy, refer to radiation oncology for possible SBRT        HISTORY OF PRESENT ILLNESS     This is a very nice 39-year-old patient.  She went to the [facility]  emergency room on August 12, 2020 with severe back pain and a large lump that was painful.  When asking her when this started she states approximately 1 to 2 weeks prior to  this although she had been losing weight for several months and was having some on and off pain there when questioning her more carefully.  She underwent a imaging studies with a CT scan of the chest abdomen and pelvis on August 13, 2020 which demonstrated  a 1.7 cm opacity within the right upper lobe felt to be most likely extrapulmonary arising from the pleura or chest wall.  The mass had a lobulated appearance with a small focus of macroscopic fat but no associated calcification.  Furthermore a 2.7 cm  rim-enhancing hypodense lesion with surrounding inflammatory changes was seen within the posterior and lateral aspect of the left 9th-10th rib.  Within the abdomen a 2.9 cm heterogeneous nodule was seen within the right adrenal gland.  She undergoes a  CT-guided biopsy and fine-needle aspiration on August 13, 2020 showing malignant cells along the posterolateral chest wall mass on the left side.  The pathology as described above.  She was seen initially by medical oncology on September 4, 2020 but referred  to my clinic for further evaluation on the day of this visit on September 16, 2020 given the unclear site of origin and pathology.        PAST MEDICAL HISTORY     1- Appendectomy  2- Kidney stones        SOCIAL HISTORY     Patient is 35 years old, she is single, she has no children, she lives with her mother and sister, she lives in [facility] [place] [place].  She started at the age of 14 smoking and currently still smokes at the age of 35.  Smokes on average 2 packs a day for the  past 20 years.  She works in manufacturing        CURRENT MEDS     See med list        ALLERGIES     Tetracycline        FAMILY HISTORY     Mother had stage III ovarian cancer at the age of 51.  Grandmother on the father's side had 2 cancers one in the breast and one in the  lung.             Subjective    Cancer  Pertinent negatives include no abdominal pain, anorexia, arthralgias, change in bowel habit, chest pain, chills, congestion, coughing, diaphoresis, fatigue, fever, headaches, joint swelling, myalgias, nausea, neck pain, numbness, rash, sore throat, swollen glands, urinary symptoms, vertigo, visual change, vomiting or weakness.         Objective    BSA: 1.83 meters squared  /66 (BP Location: Right arm, Patient Position: Sitting, BP Cuff Size: Adult)   Pulse 76   Temp 36.4 °C (97.5 °F) (Temporal)   Resp 18   Wt 73.3 kg (161 lb 9.6 oz)   SpO2 100%   BMI 26.89 kg/m²      Physical Exam  Constitutional:       General: She is not in acute distress.     Appearance: Normal appearance. She is not ill-appearing, toxic-appearing or diaphoretic.   HENT:      Nose: No congestion or rhinorrhea.      Mouth/Throat:      Pharynx: No oropharyngeal exudate or posterior oropharyngeal erythema.   Eyes:      General: No scleral icterus.     Conjunctiva/sclera: Conjunctivae normal.   Cardiovascular:      Rate and Rhythm: Normal rate and regular rhythm.      Pulses: Normal pulses.      Heart sounds: Normal heart sounds. No murmur heard.     No friction rub. No gallop.   Pulmonary:      Effort: Pulmonary effort is normal. No respiratory distress.      Breath sounds: Normal breath sounds. No stridor. No wheezing, rhonchi or rales.   Chest:      Chest wall: No tenderness.   Abdominal:      General: Abdomen is flat. Bowel sounds are normal. There is no distension.      Palpations: Abdomen is soft. There is no mass.      Tenderness: There is no abdominal tenderness. There is no guarding or rebound.   Musculoskeletal:      Cervical back: No tenderness.      Right lower leg: No edema.      Left lower leg: No edema.   Lymphadenopathy:      Cervical: No cervical adenopathy.   Skin:     General: Skin is warm.      Coloration: Skin is not jaundiced.   Neurological:      General: No focal deficit  present.      Mental Status: She is oriented to person, place, and time.   Psychiatric:         Mood and Affect: Mood normal.         Behavior: Behavior normal.         Performance Status:  Symptomatic; fully ambulatory      Assessment/Plan     Patient with metastatic non-small cell carcinoma (possibly poorly differentiated squamous) diagnosed in August 2020 with a complete response to immunotherapy.  She has been off systemic treatment since October 2022 but had a growing solitary pulmonary nodule PET positive within the right upper lobe that grew to 1 cm in March 2025 for which she had SBRT to.  We plan on repeat imaging in August 2025 with follow-up.    Cancer Staging   No matching staging information was found for the patient.      Oncology History    No history exists.                 Thaddeus Mayen MD

## 2025-06-09 NOTE — PATIENT INSTRUCTIONS
Ordres placed today:    -CT scan  -Follow up in Aug after scan    If you have any questions, please call us at [phone].

## 2025-06-10 ENCOUNTER — APPOINTMENT (OUTPATIENT)
Dept: RADIOLOGY | Facility: HOSPITAL | Age: 40
End: 2025-06-10
Payer: MEDICARE

## 2025-06-10 DIAGNOSIS — Z01.818 PRE-OP EVALUATION: ICD-10-CM

## 2025-06-10 DIAGNOSIS — Z01.818 ENCOUNTER FOR OTHER PREPROCEDURAL EXAMINATION: ICD-10-CM

## 2025-06-10 PROCEDURE — 2550000001 HC RX 255 CONTRASTS: Performed by: PHYSICIAN ASSISTANT

## 2025-06-10 PROCEDURE — 75635 CT ANGIO ABDOMINAL ARTERIES: CPT

## 2025-06-10 RX ADMIN — IOHEXOL 100 ML: 350 INJECTION, SOLUTION INTRAVENOUS at 15:15

## 2025-06-12 NOTE — PATIENT COMMUNICATION
Handicap Placard printed and placed up front at [facility] .     Tyra Barrientos LPN

## 2025-06-18 ENCOUNTER — TELEPHONE (OUTPATIENT)
Dept: ADMISSION | Facility: HOSPITAL | Age: 40
End: 2025-06-18
Payer: MEDICARE

## 2025-06-18 NOTE — TELEPHONE ENCOUNTER
Pt requesting refill   Oxycodone 10mg. 1 tablet every 4 hrs prn  Pharmacy: [facility] on [address] in [place]   Last FUV 6/9, next FUV 8/18

## 2025-06-19 DIAGNOSIS — C34.90 ADENOCARCINOMA, LUNG, UNSPECIFIED LATERALITY (MULTI): ICD-10-CM

## 2025-06-19 DIAGNOSIS — M86.10: ICD-10-CM

## 2025-06-19 RX ORDER — OXYCODONE HYDROCHLORIDE 10 MG/1
10 TABLET ORAL EVERY 4 HOURS PRN
Qty: 90 TABLET | Refills: 0 | Status: SHIPPED | OUTPATIENT
Start: 2025-06-19 | End: 2025-07-04

## 2025-06-19 NOTE — PROGRESS NOTES
OARRS reviewed.    Oxycodone refill Rx sent in Lilliam Flannery's absence.    TC to pt - she is doing well.    No changes since her last clinic visit.    Existing upcoming appt with Dr. Mayen for scan review.

## 2025-06-19 NOTE — TELEPHONE ENCOUNTER
Patient calls again about her refill.    [facility] [address].

## 2025-06-20 RX ORDER — SULFAMETHOXAZOLE AND TRIMETHOPRIM 800; 160 MG/1; MG/1
1 TABLET ORAL 2 TIMES DAILY
Qty: 180 TABLET | Refills: 1 | Status: SHIPPED | OUTPATIENT
Start: 2025-06-20

## 2025-06-26 ENCOUNTER — APPOINTMENT (OUTPATIENT)
Dept: PLASTIC SURGERY | Facility: CLINIC | Age: 40
End: 2025-06-26
Payer: MEDICARE

## 2025-07-01 ENCOUNTER — APPOINTMENT (OUTPATIENT)
Dept: RADIOLOGY | Facility: CLINIC | Age: 40
End: 2025-07-01
Payer: MEDICARE

## 2025-07-01 DIAGNOSIS — Z12.31 SCREENING MAMMOGRAM FOR BREAST CANCER: ICD-10-CM

## 2025-07-08 ENCOUNTER — APPOINTMENT (OUTPATIENT)
Dept: RADIOLOGY | Facility: CLINIC | Age: 40
End: 2025-07-08
Payer: MEDICARE

## 2025-07-08 VITALS — HEIGHT: 65 IN | BODY MASS INDEX: 26.92 KG/M2 | WEIGHT: 161.6 LBS

## 2025-07-08 DIAGNOSIS — Z12.31 SCREENING MAMMOGRAM FOR BREAST CANCER: ICD-10-CM

## 2025-07-08 PROCEDURE — 77063 BREAST TOMOSYNTHESIS BI: CPT | Performed by: RADIOLOGY

## 2025-07-08 PROCEDURE — 77067 SCR MAMMO BI INCL CAD: CPT

## 2025-07-08 PROCEDURE — 77067 SCR MAMMO BI INCL CAD: CPT | Performed by: RADIOLOGY

## 2025-07-18 ENCOUNTER — TELEPHONE (OUTPATIENT)
Dept: ADMISSION | Facility: HOSPITAL | Age: 40
End: 2025-07-18
Payer: MEDICARE

## 2025-07-18 DIAGNOSIS — C34.90 ADENOCARCINOMA, LUNG, UNSPECIFIED LATERALITY (MULTI): ICD-10-CM

## 2025-07-18 RX ORDER — OXYCODONE HYDROCHLORIDE 10 MG/1
10 TABLET ORAL EVERY 4 HOURS PRN
Qty: 90 TABLET | Refills: 0 | Status: SHIPPED | OUTPATIENT
Start: 2025-07-18 | End: 2025-08-02

## 2025-07-18 NOTE — TELEPHONE ENCOUNTER
Patient called again check on status of getting med refill before weekend comes.  Please send to [facility] #[id number] [place]

## 2025-07-18 NOTE — TELEPHONE ENCOUNTER
Medication Refill-  Oxycodone 10mg      Pharmacy-  [facility] #[id number]

## 2025-07-21 ENCOUNTER — APPOINTMENT (OUTPATIENT)
Dept: PLASTIC SURGERY | Facility: CLINIC | Age: 40
End: 2025-07-21
Payer: MEDICARE

## 2025-08-13 ENCOUNTER — HOSPITAL ENCOUNTER (OUTPATIENT)
Dept: RADIOLOGY | Facility: HOSPITAL | Age: 40
Discharge: HOME | End: 2025-08-13
Payer: MEDICARE

## 2025-08-13 DIAGNOSIS — C34.11 MALIGNANT NEOPLASM OF UPPER LOBE OF RIGHT LUNG (MULTI): ICD-10-CM

## 2025-08-13 PROCEDURE — 71250 CT THORAX DX C-: CPT

## 2025-08-13 PROCEDURE — 71250 CT THORAX DX C-: CPT | Performed by: STUDENT IN AN ORGANIZED HEALTH CARE EDUCATION/TRAINING PROGRAM

## 2025-08-18 ENCOUNTER — APPOINTMENT (OUTPATIENT)
Dept: PLASTIC SURGERY | Facility: CLINIC | Age: 40
End: 2025-08-18
Payer: MEDICARE

## 2025-08-18 ENCOUNTER — OFFICE VISIT (OUTPATIENT)
Dept: HEMATOLOGY/ONCOLOGY | Facility: HOSPITAL | Age: 40
End: 2025-08-18
Payer: MEDICARE

## 2025-08-18 VITALS
OXYGEN SATURATION: 100 % | BODY MASS INDEX: 26.74 KG/M2 | TEMPERATURE: 96.8 F | WEIGHT: 160.72 LBS | DIASTOLIC BLOOD PRESSURE: 67 MMHG | HEART RATE: 75 BPM | SYSTOLIC BLOOD PRESSURE: 108 MMHG | RESPIRATION RATE: 18 BRPM

## 2025-08-18 VITALS
DIASTOLIC BLOOD PRESSURE: 67 MMHG | HEART RATE: 75 BPM | RESPIRATION RATE: 18 BRPM | SYSTOLIC BLOOD PRESSURE: 108 MMHG | BODY MASS INDEX: 26.74 KG/M2 | WEIGHT: 160.72 LBS

## 2025-08-18 DIAGNOSIS — S91.301D OPEN WOUND OF RIGHT HEEL, SUBSEQUENT ENCOUNTER: ICD-10-CM

## 2025-08-18 DIAGNOSIS — C34.11 MALIGNANT NEOPLASM OF UPPER LOBE OF RIGHT LUNG (MULTI): ICD-10-CM

## 2025-08-18 PROCEDURE — 99214 OFFICE O/P EST MOD 30 MIN: CPT | Performed by: INTERNAL MEDICINE

## 2025-08-18 PROCEDURE — 99213 OFFICE O/P EST LOW 20 MIN: CPT

## 2025-08-18 ASSESSMENT — PAIN SCALES - GENERAL: PAINLEVEL_OUTOF10: 7

## 2025-08-19 ENCOUNTER — TELEPHONE (OUTPATIENT)
Dept: ADMISSION | Facility: HOSPITAL | Age: 40
End: 2025-08-19
Payer: MEDICARE

## 2025-08-19 ENCOUNTER — TELEPHONE (OUTPATIENT)
Dept: RADIATION ONCOLOGY | Facility: HOSPITAL | Age: 40
End: 2025-08-19
Payer: MEDICARE

## 2025-08-19 DIAGNOSIS — C34.90 ADENOCARCINOMA, LUNG, UNSPECIFIED LATERALITY (MULTI): ICD-10-CM

## 2025-08-19 DIAGNOSIS — C34.11 MALIGNANT NEOPLASM OF UPPER LOBE OF RIGHT LUNG (MULTI): Primary | ICD-10-CM

## 2025-08-19 RX ORDER — OXYCODONE HYDROCHLORIDE 10 MG/1
10 TABLET ORAL EVERY 4 HOURS PRN
Qty: 90 TABLET | Refills: 0 | Status: SHIPPED | OUTPATIENT
Start: 2025-08-19 | End: 2025-09-03

## 2025-09-02 ASSESSMENT — ENCOUNTER SYMPTOMS
VISUAL CHANGE: 0
FEVER: 0
SWOLLEN GLANDS: 0
DIAPHORESIS: 0
ARTHRALGIAS: 0
FATIGUE: 0
COUGH: 0
ABDOMINAL PAIN: 0
HEADACHES: 0
JOINT SWELLING: 0
NECK PAIN: 0
NUMBNESS: 0
NAUSEA: 0
WEAKNESS: 0
CHILLS: 0
CHANGE IN BOWEL HABIT: 0
MYALGIAS: 0
SORE THROAT: 0
VOMITING: 0
VERTIGO: 0
ANOREXIA: 0

## 2025-09-04 ENCOUNTER — APPOINTMENT (OUTPATIENT)
Dept: RADIOLOGY | Facility: HOSPITAL | Age: 40
End: 2025-09-04
Payer: MEDICARE

## 2025-09-06 ENCOUNTER — RESULTS FOLLOW-UP (OUTPATIENT)
Dept: PLASTIC SURGERY | Facility: HOSPITAL | Age: 40
End: 2025-09-06
Payer: MEDICARE

## (undated) DEVICE — COVER, CART, 45 X 27 X 48 IN, CLEAR

## (undated) DEVICE — DRAPE, SHEET, U, W/ADHESIVE STRIP, IMPERVIOUS, 60 X 70 IN, DISPOSABLE, LF, STERILE

## (undated) DEVICE — SUTURE, MONOCRYL, 2-0, 36 IN, CT-1, UNDYED

## (undated) DEVICE — APPLICATOR, CHLORAPREP, W/ORANGE TINT, 26ML

## (undated) DEVICE — COVER, BACK TABLE, 65 X 90, HVY REINFORCED

## (undated) DEVICE — DRAPE COVER, C ARM, FLOUROSCAN IMAGING SYS

## (undated) DEVICE — Device

## (undated) DEVICE — BANDAGE, ELASTIC, MATRIX, SELF-CLOSURE, 4 IN X 5 YD, LF

## (undated) DEVICE — DRAPE, SHEET, THREE QUARTER, FAN FOLD, 57 X 77 IN

## (undated) DEVICE — DRAIN, WOUND, ROUND, W/TROCAR, HOLE PATTERN, 10 IN, MEDIUM, 1/8 X 49 IN

## (undated) DEVICE — SPONGE, LAP, XRAY DECT, 18IN X 18IN, W/LOOP, STERILE

## (undated) DEVICE — TOWEL, SURGICAL, NEURO, O/R, 16 X 26, BLUE, STERILE

## (undated) DEVICE — EVACUATOR, WOUND, CLOSED, 3 SPRING, 400 CC, Y CONNECTING TUBE

## (undated) DEVICE — SUTURE, ETHILON, 2-0, FSLX 30, BLACK

## (undated) DEVICE — STAPLER, SKIN PROXIMATE, 35 WIDE

## (undated) DEVICE — COVER, C-ARM W/CLIPS, OEC GE

## (undated) DEVICE — TIP, SUCTION, FRAZIER, W/CONTROL VENT, 12 FR

## (undated) DEVICE — BANDAGE, ELASTIC, MATRIX, SELF-CLOSURE, 6 IN X 5 YD, LF

## (undated) DEVICE — TUBING, SUCTION, CONNECTING, STERILE 0.25 X 120 IN., LF

## (undated) DEVICE — ELECTRODE, ELECTROSURGICAL, BLADE, INSULATED, ENT/IMA, STERILE

## (undated) DEVICE — BANDAGE, GAUZE, CONFORMING, KERLIX, 6 PLY, 4.5 IN X 4.1 YD

## (undated) DEVICE — MANIFOLD, 4 PORT NEPTUNE STANDARD

## (undated) DEVICE — APPLICATOR, PREP, CHLORAPREP, W/ORANGE TINT, 10.5ML

## (undated) DEVICE — PROTECTOR, NERVE, ULNAR, PINK

## (undated) DEVICE — SUTURE, PDS II, 0, 27 IN, CT-2, VIOLET

## (undated) DEVICE — IRRIGATION SET, Y, LARGE BORE

## (undated) DEVICE — BANDAGE, ESMARK, 6 IN X 9 FT, STERILE

## (undated) DEVICE — DRAPE, INCISE, ANTIMICROBIAL, IOBAN 2, LARGE, 17 X 23 IN, DISPOSABLE, STERILE